# Patient Record
Sex: MALE | Race: BLACK OR AFRICAN AMERICAN | Employment: OTHER | URBAN - METROPOLITAN AREA
[De-identification: names, ages, dates, MRNs, and addresses within clinical notes are randomized per-mention and may not be internally consistent; named-entity substitution may affect disease eponyms.]

---

## 2022-01-12 ENCOUNTER — HOSPITAL ENCOUNTER (OUTPATIENT)
Dept: WOUND CARE | Age: 72
Discharge: HOME | End: 2022-01-12
Attending: PODIATRIST
Payer: MEDICARE

## 2022-01-12 ENCOUNTER — HOSPITAL ENCOUNTER (OUTPATIENT)
Dept: GENERAL RADIOLOGY | Age: 72
Discharge: HOME | End: 2022-01-12
Payer: MEDICARE

## 2022-01-12 VITALS
HEART RATE: 86 BPM | TEMPERATURE: 97 F | SYSTOLIC BLOOD PRESSURE: 154 MMHG | HEIGHT: 74 IN | WEIGHT: 171 LBS | RESPIRATION RATE: 18 BRPM | BODY MASS INDEX: 21.94 KG/M2 | DIASTOLIC BLOOD PRESSURE: 87 MMHG

## 2022-01-12 DIAGNOSIS — I87.332 IDIOPATHIC CHRONIC VENOUS HYPERTENSION OF LEFT LOWER EXTREMITY WITH ULCER AND INFLAMMATION (HCC): ICD-10-CM

## 2022-01-12 DIAGNOSIS — L89.893 PRESSURE INJURY OF LEFT FOOT, STAGE 3 (HCC): Primary | ICD-10-CM

## 2022-01-12 DIAGNOSIS — L97.929 IDIOPATHIC CHRONIC VENOUS HYPERTENSION OF LEFT LOWER EXTREMITY WITH ULCER AND INFLAMMATION (HCC): ICD-10-CM

## 2022-01-12 DIAGNOSIS — L97.422 ULCER OF LEFT HEEL, WITH FAT LAYER EXPOSED (HCC): ICD-10-CM

## 2022-01-12 DIAGNOSIS — L89.893 PRESSURE INJURY OF LEFT FOOT, STAGE 3 (HCC): ICD-10-CM

## 2022-01-12 PROCEDURE — 73630 X-RAY EXAM OF FOOT: CPT

## 2022-01-12 PROCEDURE — 99213 OFFICE O/P EST LOW 20 MIN: CPT

## 2022-01-12 RX ORDER — LIDOCAINE HYDROCHLORIDE 40 MG/ML
SOLUTION TOPICAL ONCE
Status: CANCELLED | OUTPATIENT
Start: 2022-01-12 | End: 2022-01-12

## 2022-01-12 RX ORDER — BACITRACIN ZINC AND POLYMYXIN B SULFATE 500; 1000 [USP'U]/G; [USP'U]/G
OINTMENT TOPICAL ONCE
Status: CANCELLED | OUTPATIENT
Start: 2022-01-12 | End: 2022-01-12

## 2022-01-12 RX ORDER — SILVER SULFADIAZINE 10 G/1000G
CREAM TOPICAL ONCE
Status: CANCELLED | OUTPATIENT
Start: 2022-01-12 | End: 2022-01-12

## 2022-01-12 RX ORDER — LIDOCAINE 50 MG/G
OINTMENT TOPICAL ONCE
Status: CANCELLED | OUTPATIENT
Start: 2022-01-12 | End: 2022-01-12

## 2022-01-12 RX ORDER — LIDOCAINE HYDROCHLORIDE 20 MG/ML
JELLY TOPICAL ONCE
Status: CANCELLED | OUTPATIENT
Start: 2022-01-12 | End: 2022-01-12

## 2022-01-12 RX ORDER — PANTOPRAZOLE SODIUM 40 MG/1
40 TABLET, DELAYED RELEASE ORAL 2 TIMES DAILY
COMMUNITY
End: 2022-03-23

## 2022-01-12 RX ORDER — CHLORPROMAZINE HYDROCHLORIDE 10 MG/1
10 TABLET, FILM COATED ORAL 3 TIMES DAILY
COMMUNITY
End: 2022-03-23

## 2022-01-12 RX ORDER — CLOBETASOL PROPIONATE 0.5 MG/G
OINTMENT TOPICAL ONCE
Status: CANCELLED | OUTPATIENT
Start: 2022-01-12 | End: 2022-01-12

## 2022-01-12 RX ORDER — TRIAMCINOLONE ACETONIDE 1 MG/G
OINTMENT TOPICAL ONCE
Status: CANCELLED | OUTPATIENT
Start: 2022-01-12 | End: 2022-01-12

## 2022-01-12 RX ORDER — BETAMETHASONE DIPROPIONATE 0.5 MG/G
OINTMENT TOPICAL ONCE
Status: CANCELLED | OUTPATIENT
Start: 2022-01-12 | End: 2022-01-12

## 2022-01-12 RX ORDER — GENTAMICIN SULFATE 1 MG/G
OINTMENT TOPICAL ONCE
Status: CANCELLED | OUTPATIENT
Start: 2022-01-12 | End: 2022-01-12

## 2022-01-12 RX ORDER — MUPIROCIN 20 MG/G
OINTMENT TOPICAL ONCE
Status: CANCELLED | OUTPATIENT
Start: 2022-01-12 | End: 2022-01-12

## 2022-01-12 RX ORDER — BACITRACIN 500 [USP'U]/G
OINTMENT TOPICAL ONCE
Status: CANCELLED | OUTPATIENT
Start: 2022-01-12 | End: 2022-01-12

## 2022-01-12 RX ORDER — LIDOCAINE 40 MG/G
CREAM TOPICAL ONCE
Status: CANCELLED | OUTPATIENT
Start: 2022-01-12 | End: 2022-01-12

## 2022-01-12 RX ORDER — FAMOTIDINE 20 MG/1
20 TABLET, FILM COATED ORAL 2 TIMES DAILY
COMMUNITY
End: 2022-03-23

## 2022-01-12 NOTE — DISCHARGE INSTRUCTIONS
[address]  [address], [address]  Phone: [phone]  Fax: [phone]     Patient: Jaylen Hayden MRN: [id number]  SSN: [id number]    YOB: 1950  Age: 70 y.o. Sex: male       Return Appointment: 1 week with Ernie Gary DPM     Instructions: Left plantar 1st met head:  Clean wound with saline. Acticoat Flex 3: cut top approximate size of wound, apply to wound bed. Cover with foam bandage. Change dressing 3 times weekly.     Do not get wound wet in shower. Purchase darco wedge shoe and wear at all times.     ABIs ordered.   Left foot XR ordered.        Should you experience increased redness, swelling, pain, foul odor, size of wound(s), or have a temperature over 101 degrees please contact the [address] [place] [address] Road at [phone] or if after hours contact your primary care physician or go to the hospital emergency department.     Signed By: Pavan Olivre RN      January 12, 2022

## 2022-01-12 NOTE — WOUND CARE
[address]  [address], [address]  Phone: [phone]  Fax: [phone]    Patient: Jaylen Hayden MRN: [id number]  SSN: [id number]    YOB: 1950  Age: 70 y.o. Sex: male       Return Appointment: 1 week with Ernie Gary DPM    Instructions: Left plantar 1st met head:  Clean wound with saline. Acticoat Flex 3: cut top approximate size of wound, apply to wound bed. Cover with foam bandage. Change dressing 3 times weekly. Do not get wound wet in shower. Purchase darco wedge shoe and wear at all times. ABIs ordered. Left foot XR ordered. Should you experience increased redness, swelling, pain, foul odor, size of wound(s), or have a temperature over 101 degrees please contact the [address] [place] [address] Road at [phone] or if after hours contact your primary care physician or go to the hospital emergency department.     Signed By: Pavan Oliver RN     January 12, 2022

## 2022-01-12 NOTE — WOUND CARE
01/12/22 1054   Wound Foot Left;Plantar #1   Date First Assessed/Time First Assessed: 01/12/22 1053   Present on Hospital Admission: Yes  Primary Wound Type: Neuropathic  Location: (c) Foot  Wound Location Orientation: Left;Plantar  Wound Description: #1   Wound Image    Wound Etiology Other (Comment)  (neuropathic)   Dressing Status Old drainage noted   Cleansed Cleansed with saline   Dressing/Treatment Band-Aid/Adhesive bandage   Offloading for Diabetic Foot Ulcers   (not offloading at this time)   Wound Length (cm) 0.5 cm   Wound Width (cm) 0.6 cm   Wound Depth (cm) 0.1 cm   Wound Surface Area (cm^2) 0.3 cm^2   Wound Volume (cm^3) 0.03 cm^3   Wound Assessment Pink/red   Drainage Amount Small   Drainage Description Serosanguinous   Wound Odor None   Ora-Wound/Incision Assessment Hyperkeratosis (Callous)   Wound Thickness Description Full thickness

## 2022-01-12 NOTE — PROGRESS NOTES
[address], [address] RECORD NUMBER:  [id number]  AGE: 70 y.o. RACE BLACK/  GENDER: male  : 1950  EPISODE DATE:  2022    Subjective:     Chief Complaint   Patient presents with    Follow-up     left plantar foot wound onset 1 year        Ben Fisher is a 70 y.o. BLACK/ male who presents with a wound to the left lower extremity at the sub 1st metatarsal head. He notes this has been present for over 1 year. He has been under the care of a local podiatrist since onset with various OTC ointments, prescription antibiotic ointment and insoles being tried. He reports neuropathy with sensory loss but is unknown for the etiology. He denies diabetes, back conditions and chemotherapy. Currently he is wearing insoles in his shoes and using antibiotic ointment to the wound. He notes no radiographs being taken for the last 3 years. He is concerned over edema to the LLE that is periodic but always worse than the right. He notes minimal drainage to the wound. He denies odor.       HISTORY of PRESENT ILLNESS HPI    Nature: Painless  Location: left sub 1st metatarsal head  Duration:   Onset: Patient states it started as unknown  Course: stalled  Aggravating/Alleviating: None reported  Treatment: antibiotic ointment    Ulcer Identification:  Ulcer Type: venous, pressure and neuropathic    Contributing Factors: edema, chronic pressure and shear force    Wound: venous          PAST MEDICAL HISTORY    Past Medical History:   Diagnosis Date    Idiopathic chronic venous hypertension of left lower extremity with ulcer and inflammation ([address] 2022    Peptic ulcer disease     Ulcer of left heel, with fat layer exposed ([address] 2022        PAST SURGICAL HISTORY    Past Surgical History:   Procedure Laterality Date    HX HERNIA REPAIR      hiatal hernia repair FAMILY HISTORY    History reviewed. No pertinent family history. SOCIAL HISTORY    Social History     Tobacco Use    Smoking status: Never Smoker    Smokeless tobacco: Former User   Substance Use Topics    Alcohol use: Yes    Drug use: Not on file       ALLERGIES    No Known Allergies    MEDICATIONS    Current Outpatient Medications on File Prior to Encounter   Medication Sig Dispense Refill    pantoprazole (Protonix) 40 mg tablet Take 40 mg by mouth two (2) times a day.  famotidine (Pepcid) 20 mg tablet Take 20 mg by mouth two (2) times a day.  chlorproMAZINE (THORAZINE) 10 mg tablet Take 10 mg by mouth three (3) times daily. No current facility-administered medications on file prior to encounter. REVIEW OF SYSTEMS    Pertinent items are noted in HPI. Objective:     Visit Vitals  BP (!) 154/87 (BP 1 Location: Left upper arm, BP Patient Position: At rest)   Pulse 86   Temp 97 °F (36.1 °C)   Resp 18   Ht 6' 2\" (1.88 m)   Wt 77.6 kg (171 lb)   BMI 21.96 kg/m²       Wt Readings from Last 3 Encounters:   01/12/22 77.6 kg (171 lb)       PHYSICAL EXAM    General: well developed, well nourished, pleasant , NAD. Hygiene good  Psych: cooperative. Pleasant. No anxiety or depression. Normal mood and affect. HEENT: Normocephalic, atraumatic. EOMI. Conjunctiva clear. No scleral icterus. Neck: Normal range of motion. No masses. Chest: Good air entry bilaterally. Respirations nonlabored  Cardio: Normal heart sounds,no rubs, murmurs or gallops  Abdomen: Soft, nontender, nondistended, normoactive bowel sounds    Vascular: DP and PT pulses are palpable at 2/4 bilateral. Skin temperature is uniform from proximal to distal bilateral. Hair growth is absent bilateral. No erythema, focal edema, heat is appreciated bilateral. No evidence of cellulitis. Derm: No paronychial infections are noted. Skin is atrophic and xerotic. No subcutaneous masses or hyperpigmented lesions are present.    Neuro: Epicritic sensation is absent bilateral. Protective sensation is absent with 5.07 SWMF testing to all 10 sites bilateral. Muscle tone and bulk is symmetric bilateral.  Msk: Muscle strength is 5/5 for all prime movers of the foot bilateral. No effusions are palpable. Full thickness ulceration is noted to the left sub 1st metatarsal head. Hyperkeratotic rim with fibrous base. No erythema, focal edema, purulence or odor. Soft end feel with no bone exposed or palpable. No undermining or sinus track is noted. No fluctuance with palpation. +1 pitting edema left lower extremity with hyperpigmentation noted. Clawing of the digits 1-5 bilateral with pes cavus arch/foot structure. Wound Foot Left;Plantar #1 (Active)   Wound Image   01/12/22 1054   Wound Etiology Other (Comment) 01/12/22 1054   Dressing Status Old drainage noted 01/12/22 1054   Cleansed Cleansed with saline 01/12/22 1054   Dressing/Treatment Band-Aid/Adhesive bandage 01/12/22 1054   Wound Length (cm) 0.5 cm 01/12/22 1054   Wound Width (cm) 0.6 cm 01/12/22 1054   Wound Depth (cm) 0.1 cm 01/12/22 1054   Wound Surface Area (cm^2) 0.3 cm^2 01/12/22 1054   Wound Volume (cm^3) 0.03 cm^3 01/12/22 1054   Wound Assessment Pink/red 01/12/22 1054   Drainage Amount Small 01/12/22 1054   Drainage Description Serosanguinous 01/12/22 1054   Wound Odor None 01/12/22 1054   Ora-Wound/Incision Assessment Hyperkeratosis (Callous) 01/12/22 1054   Wound Thickness Description Full thickness 01/12/22 1054   Number of days: 0       DATA REVIEW:  No results found for this or any previous visit (from the past 336 hour(s)).       Assessment/Plan:      Problem List Items Addressed This Visit        Circulatory    Idiopathic chronic venous hypertension of left lower extremity with ulcer and inflammation ([facility]    Relevant Orders    REFERRAL TO VASCULAR CENTER       Other    Ulcer of left heel, with fat layer exposed ([facility]    Relevant Orders    REFERRAL TO [facility]. [facility] [address] Other Visit Diagnoses     Pressure injury of left foot, stage 3 ([facility]    -  Primary    Relevant Orders    XR FOOT LT MIN 3 V    REFERRAL TO VASCULAR CENTER        Patient examined and evaluated. Educated patient and barriers to healing. His foot structure is such that he has clawing of the digits and pes cavus which plantarflexes the metatarsal heads creating high pressure forces. Pressure reduction is paramount due to location of the wound. Will control this through the use of a darco wedge shoe. This must be worn at all times, inside and outside the home. If he has any stability issues with this style device, will change to flat shoe with peg insole. Infection control is required to prevent loss of limb/life - maintain dressing coverage at all times, do not soak or get wet in the shower, wash hands before and after dressing changes. Monitor for erythema, edema, odor, and thick yellow drainage and contact the center immediately if noted. Proper nutrition to support skin growth includes increased protein, vitamins and amino acids - animal based foods and Oliver recommended. Moisture management to prevent maceration and dryness - keep wound covered at all times outside dressing care, do not \"air out\" or soak. Dressing will consist of acticoat changed three times a week. Radiographs ordered to evaluate for OM due to the longevity of the wound. MAGGIE studies ordered to allow for compression therapy. Return in 1 week. Patient instructed on the following: Follow all wound dressing instructions. Do not get dressing or wound wet. May shower if wound can be effectively kept dry. Cast covers may be purchased at [facility] and [facility].     Should you experience increased redness, swelling, pain, foul odor, size of wound(s), or have a temperature over 101 degrees please [facility] at [phone] or if after hours contact your primary care physician or go to the hospital emergency department. Orders Placed This Encounter    XR FOOT LT MIN 3 V     Standing Status:   Future     Standing Expiration Date:   2/11/2023     Order Specific Question:   Reason for Exam     Answer:   evaluate for ostemyelitis 1st metatarsal head    REFERRAL TO VASCULAR CENTER     Referral Priority:   Routine     Referral Type:   Consultation     Referral Reason:   Specialty Services Required     Number of Visits Requested:   1    pantoprazole (Protonix) 40 mg tablet     Sig: Take 40 mg by mouth two (2) times a day.  famotidine (Pepcid) 20 mg tablet     Sig: Take 20 mg by mouth two (2) times a day.  chlorproMAZINE (THORAZINE) 10 mg tablet     Sig: Take 10 mg by mouth three (3) times daily. Follow-up Information     Follow up With Specialties Details Why Contact Info    [address] In 1 week  [place] [address]  [phone]         MDM  Number of Diagnoses or Management Options  Idiopathic chronic venous hypertension of left lower extremity with ulcer and inflammation ([address]): new, needed workup  Pressure injury of left foot, stage 3 ([address]): new, needed workup  Ulcer of left heel, with fat layer exposed ([address]): new, needed workup     Amount and/or Complexity of Data Reviewed  Tests in the radiology section of CPT®: ordered (Radiographs, left foot  MAGGIE studies)    Risk of Complications, Morbidity, and/or Mortality  Presenting problems: moderate  Diagnostic procedures: moderate  Management options: moderate    Patient Progress  Patient progress: other (comment) (new)      Treatment Note please see attached Discharge Instructions    Written patient dismissal instructions given to patient or POA.          Electronically signed by Mayte Hannon DPM on 1/12/2022 at 11:51 AM

## 2022-01-26 ENCOUNTER — HOSPITAL ENCOUNTER (OUTPATIENT)
Dept: WOUND CARE | Age: 72
Discharge: HOME | End: 2022-01-26
Attending: PODIATRIST
Payer: MEDICARE

## 2022-01-26 VITALS — BODY MASS INDEX: 21.94 KG/M2 | HEIGHT: 74 IN | WEIGHT: 171 LBS

## 2022-01-26 DIAGNOSIS — L97.422 ULCER OF LEFT HEEL, WITH FAT LAYER EXPOSED (HCC): ICD-10-CM

## 2022-01-26 DIAGNOSIS — L97.929 IDIOPATHIC CHRONIC VENOUS HYPERTENSION OF LEFT LOWER EXTREMITY WITH ULCER AND INFLAMMATION (HCC): Primary | ICD-10-CM

## 2022-01-26 DIAGNOSIS — I87.332 IDIOPATHIC CHRONIC VENOUS HYPERTENSION OF LEFT LOWER EXTREMITY WITH ULCER AND INFLAMMATION (HCC): Primary | ICD-10-CM

## 2022-01-26 PROCEDURE — 97597 DBRDMT OPN WND 1ST 20 CM/<: CPT

## 2022-01-26 RX ORDER — TRIAMCINOLONE ACETONIDE 1 MG/G
OINTMENT TOPICAL ONCE
Status: CANCELLED | OUTPATIENT
Start: 2022-01-26 | End: 2022-01-26

## 2022-01-26 RX ORDER — BACITRACIN ZINC AND POLYMYXIN B SULFATE 500; 1000 [USP'U]/G; [USP'U]/G
OINTMENT TOPICAL ONCE
Status: CANCELLED | OUTPATIENT
Start: 2022-01-26 | End: 2022-01-26

## 2022-01-26 RX ORDER — BACITRACIN 500 [USP'U]/G
OINTMENT TOPICAL ONCE
Status: CANCELLED | OUTPATIENT
Start: 2022-01-26 | End: 2022-01-26

## 2022-01-26 RX ORDER — MUPIROCIN 20 MG/G
OINTMENT TOPICAL ONCE
Status: CANCELLED | OUTPATIENT
Start: 2022-01-26 | End: 2022-01-26

## 2022-01-26 RX ORDER — LIDOCAINE 50 MG/G
OINTMENT TOPICAL ONCE
Status: CANCELLED | OUTPATIENT
Start: 2022-01-26 | End: 2022-01-26

## 2022-01-26 RX ORDER — LIDOCAINE 40 MG/G
CREAM TOPICAL ONCE
Status: CANCELLED | OUTPATIENT
Start: 2022-01-26 | End: 2022-01-26

## 2022-01-26 RX ORDER — LIDOCAINE HYDROCHLORIDE 40 MG/ML
SOLUTION TOPICAL ONCE
Status: CANCELLED | OUTPATIENT
Start: 2022-01-26 | End: 2022-01-26

## 2022-01-26 RX ORDER — LIDOCAINE HYDROCHLORIDE 20 MG/ML
JELLY TOPICAL ONCE
Status: CANCELLED | OUTPATIENT
Start: 2022-01-26 | End: 2022-01-26

## 2022-01-26 RX ORDER — GENTAMICIN SULFATE 1 MG/G
OINTMENT TOPICAL ONCE
Status: CANCELLED | OUTPATIENT
Start: 2022-01-26 | End: 2022-01-26

## 2022-01-26 RX ORDER — BETAMETHASONE DIPROPIONATE 0.5 MG/G
OINTMENT TOPICAL ONCE
Status: CANCELLED | OUTPATIENT
Start: 2022-01-26 | End: 2022-01-26

## 2022-01-26 RX ORDER — CLOBETASOL PROPIONATE 0.5 MG/G
OINTMENT TOPICAL ONCE
Status: CANCELLED | OUTPATIENT
Start: 2022-01-26 | End: 2022-01-26

## 2022-01-26 RX ORDER — SILVER SULFADIAZINE 10 G/1000G
CREAM TOPICAL ONCE
Status: CANCELLED | OUTPATIENT
Start: 2022-01-26 | End: 2022-01-26

## 2022-01-26 NOTE — PROGRESS NOTES
[address], [address] RECORD NUMBER:  [id number]  AGE: 70 y.o. RACE BLACK/  GENDER: male  : 1950  EPISODE DATE:  2022    Subjective:     Chief Complaint   Patient presents with    Follow-up     left foot        Sigrid Mahoney is a 70 y.o. BLACK/ male who presents with a wound to the left lower extremity at the sub 1st metatarsal head. He had his MAGGIE and radiographs studies performed. He did get the darco shoe but had difficulty wearing it due to instability. He is currently wearing new custom insoles. HISTORY of PRESENT ILLNESS HPI    Nature: Painless  Location: left sub 1st metatarsal head  Duration:   Onset: Patient states it started as unknown  Course: improving  Aggravating/Alleviating: None reported  Treatment: acticoat    Ulcer Identification:  Ulcer Type: venous, pressure and neuropathic    Contributing Factors: edema, chronic pressure and shear force    Wound: venous          PAST MEDICAL HISTORY    Past Medical History:   Diagnosis Date    Idiopathic chronic venous hypertension of left lower extremity with ulcer and inflammation ([facility] 2022    Peptic ulcer disease     Ulcer of left heel, with fat layer exposed ([facility] [address] 2022        PAST SURGICAL HISTORY    Past Surgical History:   Procedure Laterality Date    HX HERNIA REPAIR      hiatal hernia repair       FAMILY HISTORY    History reviewed. No pertinent family history. SOCIAL HISTORY    Social History     Tobacco Use    Smoking status: Never Smoker    Smokeless tobacco: Former User   Substance Use Topics    Alcohol use:  Yes    Drug use: Not on file       ALLERGIES    No Known Allergies    MEDICATIONS    Current Outpatient Medications on File Prior to Encounter   Medication Sig Dispense Refill    pantoprazole (Protonix) 40 mg tablet Take 40 mg by mouth two (2) times a day.      famotidine (Pepcid) 20 mg tablet Take 20 mg by mouth two (2) times a day.  chlorproMAZINE (THORAZINE) 10 mg tablet Take 10 mg by mouth three (3) times daily. No current facility-administered medications on file prior to encounter. REVIEW OF SYSTEMS    Pertinent items are noted in HPI. Objective:     Visit Vitals  Ht 6' 2\" (1.88 m)   Wt 77.6 kg (171 lb)   BMI 21.96 kg/m²       Wt Readings from Last 3 Encounters:   01/26/22 77.6 kg (171 lb)   01/12/22 77.6 kg (171 lb)       PHYSICAL EXAM    General: well developed, well nourished, pleasant , NAD. Hygiene good  Psych: cooperative. Pleasant. No anxiety or depression. Normal mood and affect. HEENT: Normocephalic, atraumatic. EOMI. Conjunctiva clear. No scleral icterus. Neck: Normal range of motion. No masses. Chest: Good air entry bilaterally. Respirations nonlabored  Cardio: Normal heart sounds,no rubs, murmurs or gallops  Abdomen: Soft, nontender, nondistended, normoactive bowel sounds    Vascular: DP and PT pulses are palpable at 2/4 bilateral. Skin temperature is uniform from proximal to distal bilateral. Hair growth is absent bilateral. No erythema, focal edema, heat is appreciated bilateral. No evidence of cellulitis. Derm: No paronychial infections are noted. Skin is atrophic and xerotic. No subcutaneous masses or hyperpigmented lesions are present. Neuro: Epicritic sensation is absent bilateral. Protective sensation is absent with 5.07 SWMF testing to all 10 sites bilateral. Muscle tone and bulk is symmetric bilateral.  Msk: Muscle strength is 5/5 for all prime movers of the foot bilateral. No effusions are palpable. Full thickness ulceration is noted to the left sub 1st metatarsal head. Hyperkeratotic rim with dry granular base. No erythema, focal edema, purulence or odor. Soft end feel with no bone exposed or palpable. No undermining or sinus track is noted. No fluctuance with palpation.  +1 pitting edema left lower extremity with hyperpigmentation noted. Clawing of the digits 1-5 bilateral with pes cavus arch/foot structure. Wound Foot Left;Plantar #1 (Active)   Wound Image   01/26/22 1031   Wound Etiology Other (Comment) 01/26/22 1031   Dressing Status Clean;Dry; Intact 01/26/22 1031   Cleansed Cleansed with saline 01/26/22 1031   Dressing/Treatment Band-Aid/Adhesive bandage 01/12/22 1054   Offloading for Diabetic Foot Ulcers Diabetic shoes/inserts 01/26/22 1031   Wound Length (cm) 0.2 cm 01/26/22 1031   Wound Width (cm) 0.2 cm 01/26/22 1031   Wound Depth (cm) 0.3 cm 01/26/22 1031   Wound Surface Area (cm^2) 0.04 cm^2 01/26/22 1031   Change in Wound Size % 86.67 01/26/22 1031   Wound Volume (cm^3) 0.012 cm^3 01/26/22 1031   Wound Healing % 60 01/26/22 1031   Wound Assessment Pink/red 01/26/22 1031   Drainage Amount Small 01/26/22 1031   Drainage Description Serosanguinous 01/26/22 1031   Wound Odor None 01/26/22 1031   Ora-Wound/Incision Assessment Hyperkeratosis (Callous) 01/26/22 1031   Wound Thickness Description Full thickness 01/26/22 1031   Number of days: 14        DATA REVIEW:  Recent Results (from the past 336 hour(s))   DUPLEX LOW EXT ARTERIES WITH MAGGIE    Collection Time: 01/20/22  3:22 PM   Result Value Ref Range    Left CFA Velocity 93.2 cm/s    Right CFA .0 cm/s    Left CFA prox sys PSV 93.2 cm/s    Left Dist LORI Velocity 102.0 cm/s    Left popliteal dist sys .0 cm/s    Left popliteal prox sys PSV 87.9 cm/s    Left Dist PTA PSV 65.1 cm/s    Left Dist Peroneal Velocity 48.3 cm/s    Left super femoral dist sys .0 cm/s    Left super femoral mid sys .0 cm/s    Left super femoral prox sys .0 cm/s    Left Prox PFA A PSV 58.2 cm/s    Right CFA prox sys .0 cm/s    Right Dist LORI Velocity 86.2 cm/s    Right popliteal dist sys .0 cm/s    Right popliteal prox sys PSV 86.2 cm/s    Right Dist PTA PSV 85.3 cm/s    Right Dist Peroneal Velocity 55.0 cm/s    Right super femoral dist sys PSV 93.8 cm/s    Right super femoral mid sys PSV 97.6 cm/s    Right super femoral prox sys .0 cm/s    Right Prox PFA A PSV 77.7 cm/s    Dist aortic AP 2.11 cm    Dist aortic trans 1.93 cm    Left MAGGIE 1.13     Left dorsalis pedis  mmHg    Left posterior tibial 0 mmHg    Left toe pressure 129 mmHg    Left TBI 0.82     Right MAGGIE 1.06     Right dorsalis pedis  mmHg    Right posterior tibial 159 mmHg    Right toe pressure 157 mmHg    Right TBI 0.99     Left arm  mmHg    Right arm  mmHg    Right SFA Prox Kyle Ratio 0.9     Right SFA Mid Kyle Ratio 0.9     Right SFA Dist Kyle Ratio 0.96     Right Pop A Prox Kyle Ratio 0.92     Left SFA Prox Kyle Ratio 1.34     Left SFA Mid Kyle Ratio 0.91     Left SFA Dist Kyle Ratio 1.08     Left Pop A Prox Kyle Ratio 0.71        LEFT FOOT 3 view(s).     INDICATION: Wound to bottom of left foot and ankle swelling, possible  osteomyelitis first metatarsal head     TECHNIQUE: AP and lateral and oblique views.     COMPARISON: None.     FINDINGS: No periostitis of the first metatarsal. No cortical erosions. There  are small subchondral cysts. Degenerative changes osteophytes. Osteopenia. Hammertoe deformities. Arthritis of the tibiotalar talonavicular joints.     IMPRESSION  Negative for acute osteomyelitis. Degenerative changes. .    Assessment/Plan:      Problem List Items Addressed This Visit        Circulatory    Idiopathic chronic venous hypertension of left lower extremity with ulcer and inflammation (HCC) - Primary    Relevant Orders    INITIATE OUTPATIENT WOUND CARE PROTOCOL       Other    Ulcer of left heel, with fat layer exposed ([facility] [address]    Relevant Orders    INITIATE OUTPATIENT WOUND CARE PROTOCOL          Debridement Wound Care      Procedure Note  Indications:  Based on my examination of this patient's wound(s)/ulcer(s) today, debridement is required to promote healing and evaluate the wound base.     Performed by: CAMACHO Johnson obtained: Yes    Time out taken: Yes    Debridement: Other selective    Using # 15 blade scalpel the wound(s)/ulcer(s) was/were sharply debrided down through and including the removal of    subcutaneous tissue    Devitalized Tissue Debrided: fibrin, biofilm, slough and callus    Pre Debridement Measurements:  Are located in the Wound/Ulcer Documentation Flow Sheet: see above flowsheet    Other: venous, pressure    Wound/Ulcer #: left foot    Post Debridement Measurements:  Wound/Ulcer Descriptions are Pre Debridement except measurements: Total Surface Area Debrided:  0.04 sq cm     Estimated Blood Loss:  None    Hemostasis Achieved: Pressure    Procedural Pain: 0 / 10     Post Procedural Pain: 0 / 10     Response to treatment: Well tolerated by patient       Patient examined and evaluated. Educated patient and barriers to healing. His foot structure is such that he has clawing of the digits and pes cavus which plantarflexes the metatarsal heads creating high pressure forces. Selective debridement performed as above. Pressure reduction is paramount due to location of the wound. Will control this through the use of a darco wedge shoe. This must be worn at all times, inside and outside the home. If he has any stability issues with this style device, will change to flat shoe with peg insole. Infection control is required to prevent loss of limb/life - maintain dressing coverage at all times, do not soak or get wet in the shower, wash hands before and after dressing changes. Monitor for erythema, edema, odor, and thick yellow drainage and contact the center immediately if noted. Proper nutrition to support skin growth includes increased protein, vitamins and amino acids - animal based foods and Oliver recommended. Moisture management to prevent maceration and dryness - keep wound covered at all times outside dressing care, do not \"air out\" or soak.      Dressing will consist of acticoat changed three times a week. Radiographs reviewed- shows degenerative changes without osseous erosions. MAGGIE studies reviewed - WNL for compression therapy. Begin compression and return in 2 days for first change. Return in 1 week. Patient instructed on the following: Follow all wound dressing instructions. Do not get dressing or wound wet. May shower if wound can be effectively kept dry. Cast covers may be purchased at [facility] [place]. Should you experience increased redness, swelling, pain, foul odor, size of wound(s), or have a temperature over 101 degrees please contact the [address] [place] [address] Road at [phone] or if after hours contact your primary care physician or go to the hospital emergency department. Orders Placed This Encounter    INITIATE OUTPATIENT WOUND CARE PROTOCOL     Cleanse wound with saline. If wound contains bioburden or contamination, cleanse with wound cleanser or antimicrobial solution. For normal periwound tissue without irritation nor maceration, apply topical skin protectant. For periwound tissue with irritation and/or maceration, apply zinc based product, topical steroid cream/ointment, or equivalent. For wounds with dry firm black eschar and/or without exudate, apply betadine and leave open to air.     For wounds with scant/small to no exudate or drainage, apply wound gel, hydrocolloid, polymer, or equivalent and cover with secondary dressing/foam.    For wounds with moderate/large exudate or drainage, apply alginate, hydrofiber, polymer, or equivalent and cover with secondary dressing/foam.    For wounds with nonviable tissue requiring removal, apply chemical or mechanical debrider and cover with secondary dressing/foam.    For wounds with tunneling, dead space, or cavity, fill or pack with strip/guaze/kerlix to fit and cover with secondary dressing/foam.    For wounds with adequate granulation or epithelization, apply wound gel, hydrocolloid, polymer, collagen, or transparent film, and cover secondary dry dressing/foam.    For wounds that need additional secondary dressing to help pad or control additional drainage/exudates, add foam, absorbent pad or hydrocolloid. For wounds with suspected or know infection, apply antimicrobial mesh and/or antimicrobial alginate/hydrofiber, or antimicrobial solution moistened gauze/kerlix, or equivalent and cover with secondary dressing foam.    Compression management needed for edema control, apply multilayer compression or tubular garment or equivalent. Offloading Management needed for pressure relief, apply offloading shoe/boot or equivalent. Standing Status:   Standing     Number of Occurrences:   1       Follow-up Information    None        MDM  Number of Diagnoses or Management Options  Idiopathic chronic venous hypertension of left lower extremity with ulcer and inflammation ([address]): established, improving  Ulcer of left heel, with fat layer exposed ([address]): established, improving     Amount and/or Complexity of Data Reviewed  Tests in the radiology section of CPT®: reviewed (Radiograph reports  MAGGIE report)  Independent visualization of images, tracings, or specimens: yes (Radiograph images)    Risk of Complications, Morbidity, and/or Mortality  Presenting problems: low  Management options: low    Patient Progress  Patient progress: improved      Treatment Note please see attached Discharge Instructions    Written patient dismissal instructions given to patient or POA.          Electronically signed by Lili Cadena DPM on 1/26/2022 at 11:51 AM

## 2022-01-26 NOTE — WOUND CARE
[address]  [address], [address]  Phone: [phone]  Fax: [phone]    Patient: Sera Muse MRN: [id number]  SSN: [id number]    YOB: 1950  Age: 70 y.o. Sex: male       Return Appointment: 1 week with Peg Auguste DPM  Change in wound center on Friday with clinician    Instructions: Left plantar 1st met head:  Clean wound with saline. Acticoat Flex 3: cut top approximate size of wound, apply to wound bed. Secure with abd or gauze. 2 layer compression with wound and lower extremity assessment. If there is any problem with the dressing (too tight, slides down, etc.) Patient to return to wound clinic to have re-wrapped by clinician.     Do not get wound wet in shower. Continue to wear your custom inserts. Should you experience increased redness, swelling, pain, foul odor, size of wound(s), or have a temperature over 101 degrees please contact the [address] [place] [address] Road at [phone] or if after hours contact your primary care physician or go to the hospital emergency department.     Signed By: Michelle Mathur     January 26, 2022

## 2022-01-26 NOTE — WOUND CARE
01/26/22 1031   Wound Foot Left;Plantar #1   Date First Assessed/Time First Assessed: 01/12/22 1053   Present on Hospital Admission: Yes  Primary Wound Type: Neuropathic  Location: (c) Foot  Wound Location Orientation: Left;Plantar  Wound Description: #1   Wound Image    Wound Etiology Other (Comment)  (neuropathic)   Dressing Status Clean;Dry; Intact   Cleansed Cleansed with saline   Dressing/Treatment   (acticoat)   Offloading for Diabetic Foot Ulcers Diabetic shoes/inserts   Wound Length (cm) 0.2 cm   Wound Width (cm) 0.2 cm   Wound Depth (cm) 0.3 cm   Wound Surface Area (cm^2) 0.04 cm^2   Change in Wound Size % 86.67   Wound Volume (cm^3) 0.012 cm^3   Wound Healing % 60   Wound Assessment Pink/red   Drainage Amount Small   Drainage Description Serosanguinous   Wound Odor None   Ora-Wound/Incision Assessment Hyperkeratosis (Callous)   Wound Thickness Description Full thickness

## 2022-01-26 NOTE — DISCHARGE INSTRUCTIONS
[address]  [address], [address]   Phone: [phone]  Fax: [phone]    Patient: Kateryna Magallanes MRN: [id number]  SSN: [id number]    YOB: 1950  Age: 70 y.o. Sex: male       Return Appointment: 1 week with Mikel Bhakta DPM    Instructions:  Left plantar 1st met head:  Clean wound with saline. Acticoat Flex 3: cut top approximate size of wound, apply to wound bed.   Secure with abd or gauze. 2 layer compression with wound and lower extremity assessment. If there is any problem with the dressing (too tight, slides down, etc.) Patient to return to wound clinic to have re-wrapped by clinician.     Do not get wound wet in shower. Continue to wear your custom inserts. Should you experience increased redness, swelling, pain, foul odor, size of wound(s), or have a temperature over 101 degrees please contact the [address] [place] [address] Road at [phone] or if after hours contact your primary care physician or go to the hospital emergency department.     Signed By: Gino Wagner     January 26, 2022

## 2022-01-26 NOTE — WOUND CARE
Multilayer Compression Wrap   (Not Unna) Below the Knee    NAME:  Zuleyka Greer OF BIRTH:  1950  MEDICAL RECORD NUMBER:  [id number]  DATE:  1/26/2022    Removed old Multilayer wrap if indicated and wash leg with mild soap/water. Applied primary and secondary dressing as ordered. Applied multilayered dressing below the knee to left lower leg. Instructed patient/caregiver not to remove dressing and to keep it clean and dry. Instructed patient/caregiver on complications to report to provider, such as pain, numbness in toes, heavy drainage, and slippage of dressing. Instructed patient on purpose of compression dressing and on activity and exercise recommendations.     Response to treatment: Well tolerated by patient       Electronically signed by Rita Kirby on 1/26/2022 at 11:05 AM

## 2022-01-28 ENCOUNTER — HOSPITAL ENCOUNTER (OUTPATIENT)
Dept: WOUND CARE | Age: 72
Discharge: HOME | End: 2022-01-28
Attending: PODIATRIST
Payer: MEDICARE

## 2022-01-28 VITALS
BODY MASS INDEX: 22.07 KG/M2 | DIASTOLIC BLOOD PRESSURE: 88 MMHG | SYSTOLIC BLOOD PRESSURE: 143 MMHG | WEIGHT: 172 LBS | TEMPERATURE: 98.4 F | HEART RATE: 84 BPM | RESPIRATION RATE: 18 BRPM | HEIGHT: 74 IN

## 2022-01-28 DIAGNOSIS — L97.929 IDIOPATHIC CHRONIC VENOUS HYPERTENSION OF LEFT LOWER EXTREMITY WITH ULCER AND INFLAMMATION (HCC): Primary | ICD-10-CM

## 2022-01-28 DIAGNOSIS — L97.422 ULCER OF LEFT HEEL, WITH FAT LAYER EXPOSED (HCC): ICD-10-CM

## 2022-01-28 DIAGNOSIS — I87.332 IDIOPATHIC CHRONIC VENOUS HYPERTENSION OF LEFT LOWER EXTREMITY WITH ULCER AND INFLAMMATION (HCC): Primary | ICD-10-CM

## 2022-01-28 PROCEDURE — 29581 APPL MULTLAYER CMPRN SYS LEG: CPT

## 2022-01-28 RX ORDER — LIDOCAINE 50 MG/G
OINTMENT TOPICAL ONCE
Status: CANCELLED | OUTPATIENT
Start: 2022-01-28 | End: 2022-01-28

## 2022-01-28 RX ORDER — LIDOCAINE HYDROCHLORIDE 20 MG/ML
JELLY TOPICAL ONCE
Status: CANCELLED | OUTPATIENT
Start: 2022-01-28 | End: 2022-01-28

## 2022-01-28 RX ORDER — MUPIROCIN 20 MG/G
OINTMENT TOPICAL ONCE
Status: CANCELLED | OUTPATIENT
Start: 2022-01-28 | End: 2022-01-28

## 2022-01-28 RX ORDER — GENTAMICIN SULFATE 1 MG/G
OINTMENT TOPICAL ONCE
Status: CANCELLED | OUTPATIENT
Start: 2022-01-28 | End: 2022-01-28

## 2022-01-28 RX ORDER — BACITRACIN ZINC AND POLYMYXIN B SULFATE 500; 1000 [USP'U]/G; [USP'U]/G
OINTMENT TOPICAL ONCE
Status: CANCELLED | OUTPATIENT
Start: 2022-01-28 | End: 2022-01-28

## 2022-01-28 RX ORDER — CLOBETASOL PROPIONATE 0.5 MG/G
OINTMENT TOPICAL ONCE
Status: CANCELLED | OUTPATIENT
Start: 2022-01-28 | End: 2022-01-28

## 2022-01-28 RX ORDER — TRIAMCINOLONE ACETONIDE 1 MG/G
OINTMENT TOPICAL ONCE
Status: CANCELLED | OUTPATIENT
Start: 2022-01-28 | End: 2022-01-28

## 2022-01-28 RX ORDER — BACITRACIN 500 [USP'U]/G
OINTMENT TOPICAL ONCE
Status: CANCELLED | OUTPATIENT
Start: 2022-01-28 | End: 2022-01-28

## 2022-01-28 RX ORDER — BETAMETHASONE DIPROPIONATE 0.5 MG/G
OINTMENT TOPICAL ONCE
Status: CANCELLED | OUTPATIENT
Start: 2022-01-28 | End: 2022-01-28

## 2022-01-28 RX ORDER — LIDOCAINE HYDROCHLORIDE 40 MG/ML
SOLUTION TOPICAL ONCE
Status: CANCELLED | OUTPATIENT
Start: 2022-01-28 | End: 2022-01-28

## 2022-01-28 RX ORDER — LIDOCAINE 40 MG/G
CREAM TOPICAL ONCE
Status: CANCELLED | OUTPATIENT
Start: 2022-01-28 | End: 2022-01-28

## 2022-01-28 RX ORDER — SILVER SULFADIAZINE 10 G/1000G
CREAM TOPICAL ONCE
Status: CANCELLED | OUTPATIENT
Start: 2022-01-28 | End: 2022-01-28

## 2022-01-28 NOTE — WOUND CARE
01/28/22 1115   Wound Foot Left;Plantar #1   Date First Assessed/Time First Assessed: 01/12/22 1053   Present on Hospital Admission: Yes  Primary Wound Type: Neuropathic  Location: (c) Foot  Wound Location Orientation: Left;Plantar  Wound Description: #1   Wound Image    Wound Etiology Other (Comment)  (neuropathic)   Dressing Status Clean;Dry; Intact   Cleansed Soap and water   Dressing/Treatment   (acticoat, coflex)   Offloading for Diabetic Foot Ulcers Diabetic shoes/inserts   Wound Length (cm) 0.2 cm   Wound Width (cm) 0.2 cm   Wound Depth (cm) 0.1 cm   Wound Surface Area (cm^2) 0.04 cm^2   Change in Wound Size % 86.67   Wound Volume (cm^3) 0.004 cm^3   Wound Healing % 87   Wound Assessment Pink/red   Drainage Amount Scant   Drainage Description Serosanguinous   Wound Odor None   Ora-Wound/Incision Assessment Hyperkeratosis (Callous)   Wound Thickness Description Full thickness

## 2022-01-28 NOTE — WOUND CARE
Multilayer Compression Wrap   (Not Unna) Below the Knee    NAME:  Alfredo Mccollum OF BIRTH:  1950  MEDICAL RECORD NUMBER:  [id number]  DATE:  1/28/2022    Removed old Multilayer wrap if indicated and wash leg with mild soap/water. Applied moisturizing agent to dry skin as needed. Applied primary and secondary dressing as ordered. Applied multilayered dressing below the knee to left lower leg. Instructed patient/caregiver not to remove dressing and to keep it clean and dry. Instructed patient/caregiver on complications to report to provider, such as pain, numbness in toes, heavy drainage, and slippage of dressing. Instructed patient on purpose of compression dressing and on activity and exercise recommendations.     Response to treatment: Well tolerated by patient       Electronically signed by Landon Nesbitt on 1/28/2022 at 11:16 AM

## 2022-02-02 ENCOUNTER — HOSPITAL ENCOUNTER (OUTPATIENT)
Dept: WOUND CARE | Age: 72
Discharge: HOME | End: 2022-02-02
Attending: PODIATRIST
Payer: MEDICARE

## 2022-02-02 VITALS
OXYGEN SATURATION: 99 % | RESPIRATION RATE: 16 BRPM | DIASTOLIC BLOOD PRESSURE: 79 MMHG | WEIGHT: 168 LBS | BODY MASS INDEX: 21.56 KG/M2 | HEART RATE: 74 BPM | TEMPERATURE: 97.2 F | SYSTOLIC BLOOD PRESSURE: 130 MMHG | HEIGHT: 74 IN

## 2022-02-02 DIAGNOSIS — I87.332 IDIOPATHIC CHRONIC VENOUS HYPERTENSION OF LEFT LOWER EXTREMITY WITH ULCER AND INFLAMMATION (HCC): Primary | ICD-10-CM

## 2022-02-02 DIAGNOSIS — L97.929 IDIOPATHIC CHRONIC VENOUS HYPERTENSION OF LEFT LOWER EXTREMITY WITH ULCER AND INFLAMMATION (HCC): Primary | ICD-10-CM

## 2022-02-02 DIAGNOSIS — L97.422 ULCER OF LEFT HEEL, WITH FAT LAYER EXPOSED (HCC): ICD-10-CM

## 2022-02-02 PROCEDURE — 99212 OFFICE O/P EST SF 10 MIN: CPT

## 2022-02-02 RX ORDER — SILVER SULFADIAZINE 10 G/1000G
CREAM TOPICAL ONCE
Status: CANCELLED | OUTPATIENT
Start: 2022-02-02 | End: 2022-02-02

## 2022-02-02 RX ORDER — MUPIROCIN 20 MG/G
OINTMENT TOPICAL ONCE
Status: CANCELLED | OUTPATIENT
Start: 2022-02-02 | End: 2022-02-02

## 2022-02-02 RX ORDER — BETAMETHASONE DIPROPIONATE 0.5 MG/G
OINTMENT TOPICAL ONCE
Status: CANCELLED | OUTPATIENT
Start: 2022-02-02 | End: 2022-02-02

## 2022-02-02 RX ORDER — LIDOCAINE 40 MG/G
CREAM TOPICAL ONCE
Status: CANCELLED | OUTPATIENT
Start: 2022-02-02 | End: 2022-02-02

## 2022-02-02 RX ORDER — TRIAMCINOLONE ACETONIDE 1 MG/G
OINTMENT TOPICAL ONCE
Status: CANCELLED | OUTPATIENT
Start: 2022-02-02 | End: 2022-02-02

## 2022-02-02 RX ORDER — LIDOCAINE HYDROCHLORIDE 20 MG/ML
JELLY TOPICAL ONCE
Status: CANCELLED | OUTPATIENT
Start: 2022-02-02 | End: 2022-02-02

## 2022-02-02 RX ORDER — BACITRACIN ZINC AND POLYMYXIN B SULFATE 500; 1000 [USP'U]/G; [USP'U]/G
OINTMENT TOPICAL ONCE
Status: CANCELLED | OUTPATIENT
Start: 2022-02-02 | End: 2022-02-02

## 2022-02-02 RX ORDER — BACITRACIN 500 [USP'U]/G
OINTMENT TOPICAL ONCE
Status: CANCELLED | OUTPATIENT
Start: 2022-02-02 | End: 2022-02-02

## 2022-02-02 RX ORDER — LIDOCAINE HYDROCHLORIDE 40 MG/ML
SOLUTION TOPICAL ONCE
Status: CANCELLED | OUTPATIENT
Start: 2022-02-02 | End: 2022-02-02

## 2022-02-02 RX ORDER — GENTAMICIN SULFATE 1 MG/G
OINTMENT TOPICAL ONCE
Status: CANCELLED | OUTPATIENT
Start: 2022-02-02 | End: 2022-02-02

## 2022-02-02 RX ORDER — LIDOCAINE 50 MG/G
OINTMENT TOPICAL ONCE
Status: CANCELLED | OUTPATIENT
Start: 2022-02-02 | End: 2022-02-02

## 2022-02-02 RX ORDER — CLOBETASOL PROPIONATE 0.5 MG/G
OINTMENT TOPICAL ONCE
Status: CANCELLED | OUTPATIENT
Start: 2022-02-02 | End: 2022-02-02

## 2022-02-02 NOTE — DISCHARGE INSTRUCTIONS
[address]  [address], [address]  Phone: [phone]  Fax: [phone]    Patient: Tay Prakash MRN: [id number]  SSN: [id number]    YOB: 1950  Age: 70 y.o. Sex: male       Return Appointment: as needed with Adelaida Dietrich DPM    Instructions: Wound has healed. Continue to offload with your diabetic shoes. Wear compression stockings daily. Follow up with your podiatrist.       Should you experience increased redness, swelling, pain, foul odor, size of wound(s), or have a temperature over 101 degrees please contact the [address] [place] [address] Road at [phone] or if after hours contact your primary care physician or go to the hospital emergency department.     Signed By: Shadia Watters     February 2, 2022

## 2022-02-02 NOTE — WOUND CARE
[address]  [address], [address]  Phone: [phone]  Fax: [phone]    Patient: Eugenie Arriola MRN: [id number]  SSN: [id number]    YOB: 1950  Age: 70 y.o. Sex: male       Return Appointment: as needed with French Bettencourt DPM    Instructions: Wound has healed. Continue to offload with your diabetic shoes. Wear compression stockings daily. Follow up with your podiatrist.    Should you experience increased redness, swelling, pain, foul odor, size of wound(s), or have a temperature over 101 degrees please contact the [address] [place] [address] Road at [phone] or if after hours contact your primary care physician or go to the hospital emergency department.     Signed By: Rita Kirby     February 2, 2022

## 2022-02-02 NOTE — PROGRESS NOTES
[address] [place] [place]borne, [address] RECORD NUMBER:  [id number]  AGE: 70 y.o. RACE BLACK/  GENDER: male  : 1950  EPISODE DATE:  2022    Subjective:     Chief Complaint   Patient presents with    Follow-up     left foot        Nidhi Galicia is a 70 y.o. BLACK/ male who presents with a wound to the left lower extremity at the sub 1st metatarsal head. He tolerated the compression well. HISTORY of PRESENT ILLNESS HPI    Nature: Painless  Location: left sub 1st metatarsal head  Duration:   Onset: Patient states it started as unknown  Course: improving  Aggravating/Alleviating: None reported  Treatment: acticoat, compression    Ulcer Identification:  Ulcer Type: venous, pressure and neuropathic    Contributing Factors: edema, chronic pressure and shear force    Wound: venous          PAST MEDICAL HISTORY    Past Medical History:   Diagnosis Date    Idiopathic chronic venous hypertension of left lower extremity with ulcer and inflammation ([facility] [address] 2022    Peptic ulcer disease     Ulcer of left heel, with fat layer exposed ([facility] [address] 2022        PAST SURGICAL HISTORY    Past Surgical History:   Procedure Laterality Date    HX HERNIA REPAIR      hiatal hernia repair       FAMILY HISTORY    History reviewed. No pertinent family history. SOCIAL HISTORY    Social History     Tobacco Use    Smoking status: Never Smoker    Smokeless tobacco: Former User   Substance Use Topics    Alcohol use: Yes    Drug use: Not on file       ALLERGIES    No Known Allergies    MEDICATIONS    Current Outpatient Medications on File Prior to Encounter   Medication Sig Dispense Refill    pantoprazole (Protonix) 40 mg tablet Take 40 mg by mouth two (2) times a day.  famotidine (Pepcid) 20 mg tablet Take 20 mg by mouth two (2) times a day.       chlorproMAZINE (THORAZINE) 10 mg tablet Take 10 mg by mouth three (3) times daily. No current facility-administered medications on file prior to encounter. REVIEW OF SYSTEMS    Pertinent items are noted in HPI. Objective:     Visit Vitals  /79 (BP 1 Location: Left upper arm, BP Patient Position: Sitting)   Pulse 74   Temp 97.2 °F (36.2 °C)   Resp 16   Ht 6' 2\" (1.88 m)   Wt 76.2 kg (168 lb)   SpO2 99%   BMI 21.57 kg/m²       Wt Readings from Last 3 Encounters:   02/02/22 76.2 kg (168 lb)   01/28/22 78 kg (172 lb)   01/26/22 77.6 kg (171 lb)       PHYSICAL EXAM    General: well developed, well nourished, pleasant , NAD. Hygiene good  Psych: cooperative. Pleasant. No anxiety or depression. Normal mood and affect. HEENT: Normocephalic, atraumatic. EOMI. Conjunctiva clear. No scleral icterus. Neck: Normal range of motion. No masses. Chest: Good air entry bilaterally. Respirations nonlabored  Cardio: Normal heart sounds,no rubs, murmurs or gallops  Abdomen: Soft, nontender, nondistended, normoactive bowel sounds    Vascular: DP and PT pulses are palpable at 2/4 bilateral. Skin temperature is uniform from proximal to distal bilateral. Hair growth is absent bilateral. No erythema, focal edema, heat is appreciated bilateral. No evidence of cellulitis. Derm: No paronychial infections are noted. Skin is atrophic and xerotic. No subcutaneous masses or hyperpigmented lesions are present. Neuro: Epicritic sensation is absent bilateral. Protective sensation is absent with 5.07 SWMF testing to all 10 sites bilateral. Muscle tone and bulk is symmetric bilateral.  Msk: Muscle strength is 5/5 for all prime movers of the foot bilateral. No effusions are palpable. Prior full thickness ulceration to the left sub 1st metatarsal head with epithelium to the base and hyperkeratosis tot he rim. No drainage. No pain. Significant reduction in calf circumference.      Wound Foot Left;Plantar #1 (Active)   Wound Image   02/02/22 1332   Wound Etiology Other (Comment) 02/02/22 0850   Dressing Status Clean;Dry; Intact 02/02/22 0850   Cleansed Soap and water 02/02/22 0850   Dressing/Treatment Band-Aid/Adhesive bandage 01/12/22 1054   Offloading for Diabetic Foot Ulcers Diabetic shoes/inserts 02/02/22 0850   Wound Length (cm) 0 cm 02/02/22 0850   Wound Width (cm) 0 cm 02/02/22 0850   Wound Depth (cm) 0 cm 02/02/22 0850   Wound Surface Area (cm^2) 0 cm^2 02/02/22 0850   Change in Wound Size % 100 02/02/22 0850   Wound Volume (cm^3) 0 cm^3 02/02/22 0850   Wound Healing % 100 02/02/22 0850   Post-Procedure Length (cm) 0.2 cm 01/26/22 1031   Post-Procedure Width (cm) 0.2 cm 01/26/22 1031   Post-Procedure Depth (cm) 0.1 cm 01/26/22 1031   Post-Procedure Surface Area (cm^2) 0.04 cm^2 01/26/22 1031   Post-Procedure Volume (cm^3) 0.004 cm^3 01/26/22 1031   Wound Assessment Epithelialization 02/02/22 0850   Drainage Amount None 02/02/22 0850   Drainage Description Serosanguinous 01/28/22 1115   Wound Odor None 02/02/22 0850   Ora-Wound/Incision Assessment Hyperkeratosis (Callous) 02/02/22 0850   Wound Thickness Description Full thickness 02/02/22 0850   Number of days: 21        DATA REVIEW:  Recent Results (from the past 336 hour(s))   DUPLEX LOW EXT ARTERIES WITH MAGGIE    Collection Time: 01/20/22  3:22 PM   Result Value Ref Range    Left CFA Velocity 93.2 cm/s    Right CFA .0 cm/s    Left CFA prox sys PSV 93.2 cm/s    Left Dist LORI Velocity 102.0 cm/s    Left popliteal dist sys .0 cm/s    Left popliteal prox sys PSV 87.9 cm/s    Left Dist PTA PSV 65.1 cm/s    Left Dist Peroneal Velocity 48.3 cm/s    Left super femoral dist sys .0 cm/s    Left super femoral mid sys .0 cm/s    Left super femoral prox sys .0 cm/s    Left Prox PFA A PSV 58.2 cm/s    Right CFA prox sys .0 cm/s    Right Dist LORI Velocity 86.2 cm/s    Right popliteal dist sys .0 cm/s    Right popliteal prox sys PSV 86.2 cm/s    Right Dist PTA PSV 85.3 cm/s Right Dist Peroneal Velocity 55.0 cm/s    Right super femoral dist sys PSV 93.8 cm/s    Right super femoral mid sys PSV 97.6 cm/s    Right super femoral prox sys .0 cm/s    Right Prox PFA A PSV 77.7 cm/s    Dist aortic AP 2.11 cm    Dist aortic trans 1.93 cm    Left MAGGIE 1.13     Left dorsalis pedis  mmHg    Left posterior tibial 0 mmHg    Left toe pressure 129 mmHg    Left TBI 0.82     Right MAGGIE 1.06     Right dorsalis pedis  mmHg    Right posterior tibial 159 mmHg    Right toe pressure 157 mmHg    Right TBI 0.99     Left arm  mmHg    Right arm  mmHg    Right SFA Prox Kyle Ratio 0.9     Right SFA Mid Kyle Ratio 0.9     Right SFA Dist Kyle Ratio 0.96     Right Pop A Prox Kyle Ratio 0.92     Left SFA Prox Kyle Ratio 1.34     Left SFA Mid Kyle Ratio 0.91     Left SFA Dist Kyle Ratio 1.08     Left Pop A Prox Kyle Ratio 0.71        LEFT FOOT 3 view(s).     INDICATION: Wound to bottom of left foot and ankle swelling, possible  osteomyelitis first metatarsal head     TECHNIQUE: AP and lateral and oblique views.     COMPARISON: None.     FINDINGS: No periostitis of the first metatarsal. No cortical erosions. There  are small subchondral cysts. Degenerative changes osteophytes. Osteopenia. Hammertoe deformities. Arthritis of the tibiotalar talonavicular joints.     IMPRESSION  Negative for acute osteomyelitis. Degenerative changes. .    Assessment/Plan:      Problem List Items Addressed This Visit        Circulatory    Idiopathic chronic venous hypertension of left lower extremity with ulcer and inflammation (HCC) - Primary    Relevant Orders    INITIATE OUTPATIENT WOUND CARE PROTOCOL       Other    Ulcer of left heel, with fat layer exposed ([facility] [address]    Relevant Orders    INITIATE OUTPATIENT WOUND CARE PROTOCOL         Wound has healed. He may return to normal hygiene. Wear compression daily. Return to his podiatrist for foot care. Return here as needed.      Patient instructed on the following:    Should you experience increased redness, swelling, pain, foul odor, size of wound(s), or have a temperature over 101 degrees please contact the [address] [place] [address] Road at [phone] or if after hours contact your primary care physician or go to the hospital emergency department. Orders Placed This Encounter    INITIATE OUTPATIENT WOUND CARE PROTOCOL     Cleanse wound with saline. If wound contains bioburden or contamination, cleanse with wound cleanser or antimicrobial solution. For normal periwound tissue without irritation nor maceration, apply topical skin protectant. For periwound tissue with irritation and/or maceration, apply zinc based product, topical steroid cream/ointment, or equivalent. For wounds with dry firm black eschar and/or without exudate, apply betadine and leave open to air. For wounds with scant/small to no exudate or drainage, apply wound gel, hydrocolloid, polymer, or equivalent and cover with secondary dressing/foam.    For wounds with moderate/large exudate or drainage, apply alginate, hydrofiber, polymer, or equivalent and cover with secondary dressing/foam.    For wounds with nonviable tissue requiring removal, apply chemical or mechanical debrider and cover with secondary dressing/foam.    For wounds with tunneling, dead space, or cavity, fill or pack with strip/guaze/kerlix to fit and cover with secondary dressing/foam.    For wounds with adequate granulation or epithelization, apply wound gel, hydrocolloid, polymer, collagen, or transparent film, and cover secondary dry dressing/foam.    For wounds that need additional secondary dressing to help pad or control additional drainage/exudates, add foam, absorbent pad or hydrocolloid.     For wounds with suspected or know infection, apply antimicrobial mesh and/or antimicrobial alginate/hydrofiber, or antimicrobial solution moistened gauze/kerlix, or equivalent and cover with secondary dressing foam.    Compression management needed for edema control, apply multilayer compression or tubular garment or equivalent. Offloading Management needed for pressure relief, apply offloading shoe/boot or equivalent. Standing Status:   Standing     Number of Occurrences:   1       Follow-up Information    None        MDM  Number of Diagnoses or Management Options  Idiopathic chronic venous hypertension of left lower extremity with ulcer and inflammation ([address]): established, improving  Ulcer of left heel, with fat layer exposed ([address]): established, improving  Risk of Complications, Morbidity, and/or Mortality  Presenting problems: minimal  Management options: minimal    Patient Progress  Patient progress: resolved      Treatment Note please see attached Discharge Instructions    Written patient dismissal instructions given to patient or POA.          Electronically signed by Lili Cadena DPM on 2/2/2022 at 11:51 AM

## 2022-02-02 NOTE — WOUND CARE
02/02/22 0850   Wound Foot Left;Plantar #1   Date First Assessed/Time First Assessed: 01/12/22 1053   Present on Hospital Admission: Yes  Primary Wound Type: Neuropathic  Location: (c) Foot  Wound Location Orientation: Left;Plantar  Wound Description: #1   Wound Image    Wound Etiology Other (Comment)  (neuropathic)   Dressing Status Clean;Dry; Intact   Cleansed Soap and water   Dressing/Treatment   (acticoat. coflex)   Offloading for Diabetic Foot Ulcers Diabetic shoes/inserts   Wound Length (cm) 0 cm   Wound Width (cm) 0 cm   Wound Depth (cm) 0 cm   Wound Surface Area (cm^2) 0 cm^2   Change in Wound Size % 100   Wound Volume (cm^3) 0 cm^3   Wound Healing % 100   Wound Assessment Epithelialization   Drainage Amount None   Wound Odor None   Ora-Wound/Incision Assessment Hyperkeratosis (Callous)   Wound Thickness Description Full thickness

## 2022-03-02 ENCOUNTER — HOSPITAL ENCOUNTER (OUTPATIENT)
Dept: WOUND CARE | Age: 72
Discharge: HOME | End: 2022-03-02
Attending: PODIATRIST
Payer: MEDICARE

## 2022-03-02 VITALS
HEART RATE: 88 BPM | BODY MASS INDEX: 21.05 KG/M2 | HEIGHT: 74 IN | RESPIRATION RATE: 18 BRPM | TEMPERATURE: 97.8 F | DIASTOLIC BLOOD PRESSURE: 72 MMHG | WEIGHT: 164 LBS | OXYGEN SATURATION: 98 % | SYSTOLIC BLOOD PRESSURE: 125 MMHG

## 2022-03-02 DIAGNOSIS — L97.422 ULCER OF LEFT HEEL, WITH FAT LAYER EXPOSED (HCC): ICD-10-CM

## 2022-03-02 DIAGNOSIS — I87.332 IDIOPATHIC CHRONIC VENOUS HYPERTENSION OF LEFT LOWER EXTREMITY WITH ULCER AND INFLAMMATION (HCC): ICD-10-CM

## 2022-03-02 DIAGNOSIS — L97.929 IDIOPATHIC CHRONIC VENOUS HYPERTENSION OF LEFT LOWER EXTREMITY WITH ULCER AND INFLAMMATION (HCC): ICD-10-CM

## 2022-03-02 PROCEDURE — 11042 DBRDMT SUBQ TIS 1ST 20SQCM/<: CPT

## 2022-03-02 NOTE — WOUND CARE
03/02/22 0902   Wound Foot Left;Plantar #1   Date First Assessed/Time First Assessed: 01/12/22 1053   Present on Hospital Admission: Yes  Primary Wound Type: Neuropathic  Location: (c) Foot  Wound Location Orientation: Left;Plantar  Wound Description: #1   Wound Image    Wound Etiology   (neuropathic)   Dressing Status Old drainage noted   Cleansed Cleansed with saline   Dressing/Treatment   (band-aid)   Offloading for Diabetic Foot Ulcers Diabetic shoes/inserts   Wound Length (cm) 0.5 cm   Wound Width (cm) 0.2 cm   Wound Depth (cm) 0.5 cm   Wound Surface Area (cm^2) 0.1 cm^2   Change in Wound Size % 66.67   Wound Volume (cm^3) 0.05 cm^3   Wound Healing % -67   Wound Assessment Pale granulation tissue;Slough   Drainage Amount Moderate   Drainage Description Serosanguinous   Wound Odor Mild   Oar-Wound/Incision Assessment Blisters; Hyperkeratosis (Callous); Maceration   Wound Thickness Description Full thickness

## 2022-03-02 NOTE — DISCHARGE INSTRUCTIONS
Left Foot and Left Great Toe:\  Cleanse wounds with saline. DO NOT GET WET IN SHOWER, USE CAST COVER. Apply Acticoat Flex 3: cut top approximate size of wound, apply to wound bed. Cover with Optilock pad on foot and gauze and rolled gauze on toe. Apply 2 layer compression with wound and lower extremity assessment to left leg. If there is any problem with the dressing (too tight, slides down, etc.) Patient to return to wound clinic to have re-wrapped by clinician. Change 2 times a week. -Keep appt with Dr. Delisa Quintero next week for adjustment to inserts.

## 2022-03-02 NOTE — WOUND CARE
Multilayer Compression Wrap   (Not Unna) Below the Knee    NAME:  Sim Mchugh  YOB: 1950  MEDICAL RECORD NUMBER:  [id number]  DATE:  3/2/2022    Applied moisturizing agent to dry skin as needed. Applied primary and secondary dressing as ordered. Applied multilayered dressing below the knee to left lower leg. Instructed patient/caregiver not to remove dressing and to keep it clean and dry. Instructed patient/caregiver on complications to report to provider, such as pain, numbness in toes, heavy drainage, and slippage of dressing. Instructed patient on purpose of compression dressing and on activity and exercise recommendations.     Response to treatment: Well tolerated by patient       Electronically signed by Mega Cooney RN on 3/2/2022 at 9:36 AM

## 2022-03-02 NOTE — WOUND CARE
[address]  [address], [address]   Phone: [phone]  Fax: [phone]    Patient: Tuyet Lyons MRN: [id number]  SSN: [id number]    YOB: 1950  Age: 70 y.o. Sex: male       Return Appointment: 1 week with Cruzito Costello DPM   Return Appointment: Friday with Clinician      Instructions:   Left Foot and Left Great Toe:\  Cleanse wounds with saline. DO NOT GET WET IN SHOWER, USE CAST COVER. Apply Acticoat Flex 3: cut top approximate size of wound, apply to wound bed. Cover with Optilock pad on foot and gauze and rolled gauze on toe. Apply 2 layer compression with wound and lower extremity assessment to left leg. If there is any problem with the dressing (too tight, slides down, etc.) Patient to return to wound clinic to have re-wrapped by clinician. Change 2 times a week. -Keep appt with Dr. Marisel Farah next week for adjustment to inserts. Should you experience increased redness, swelling, pain, foul odor, size of wound(s), or have a temperature over 101 degrees please contact the [address] at [phone] or if after hours contact your primary care physician or go to the hospital emergency department.     Signed By: Gail Ruiz RN     March 2, 2022

## 2022-03-02 NOTE — PROGRESS NOTES
[facility] [address] [facility] [address] RECORD NUMBER:  [id number]  AGE: 70 y.o. RACE BLACK/  GENDER: male  : 1950  EPISODE DATE:  3/2/2022    Subjective:     Chief Complaint   Patient presents with    Follow-up     left foot        Nayla Napoles is a 70 y.o. BLACK/ male who presents with a recurrent wound to the left lower extremity at the sub 1st metatarsal head. He noted drainage begin 1-2 weeks ago. He also notes a fluid filled blister to the distal hallux. He is wearing his compression stockings daily. He has an appointment with his podiatrist next Thursday. He has diabetic insoles that he wears in [employer]. HISTORY of PRESENT ILLNESS HPI    Nature: Painless  Location: left sub 1st metatarsal head, left distal hallux  Duration:   Onset: Patient states it started as unknown  Course: recurrent  Aggravating/Alleviating: None reported  Treatment: none    Ulcer Identification:  Ulcer Type: venous, pressure and neuropathic    Contributing Factors: edema, chronic pressure and shear force    Wound: venous          PAST MEDICAL HISTORY    Past Medical History:   Diagnosis Date    Idiopathic chronic venous hypertension of left lower extremity with ulcer and inflammation ([facility] 2022    Peptic ulcer disease     Ulcer of left heel, with fat layer exposed ([facility] [address] 2022        PAST SURGICAL HISTORY    Past Surgical History:   Procedure Laterality Date    HX HERNIA REPAIR      hiatal hernia repair       FAMILY HISTORY    History reviewed. No pertinent family history. SOCIAL HISTORY    Social History     Tobacco Use    Smoking status: Never Smoker    Smokeless tobacco: Former User   Substance Use Topics    Alcohol use:  Yes    Drug use: Not on file       ALLERGIES    No Known Allergies    MEDICATIONS    Current Outpatient Medications on File Prior to Encounter   Medication Sig Dispense Refill    pantoprazole (Protonix) 40 mg tablet Take 40 mg by mouth two (2) times a day.  famotidine (Pepcid) 20 mg tablet Take 20 mg by mouth two (2) times a day.  chlorproMAZINE (THORAZINE) 10 mg tablet Take 10 mg by mouth three (3) times daily. No current facility-administered medications on file prior to encounter. REVIEW OF SYSTEMS    Pertinent items are noted in HPI. Objective:     Visit Vitals  /72 (BP 1 Location: Left upper arm, BP Patient Position: Sitting)   Pulse 88   Temp 97.8 °F (36.6 °C)   Resp 18   Ht 6' 2\" (1.88 m)   Wt 74.4 kg (164 lb)   SpO2 98%   BMI 21.06 kg/m²       Wt Readings from Last 3 Encounters:   03/02/22 74.4 kg (164 lb)   02/02/22 76.2 kg (168 lb)   01/28/22 78 kg (172 lb)       PHYSICAL EXAM    General: well developed, well nourished, pleasant , NAD. Hygiene good  Psych: cooperative. Pleasant. No anxiety or depression. Normal mood and affect. HEENT: Normocephalic, atraumatic. EOMI. Conjunctiva clear. No scleral icterus. Neck: Normal range of motion. No masses. Chest: Good air entry bilaterally. Respirations nonlabored  Cardio: Normal heart sounds,no rubs, murmurs or gallops  Abdomen: Soft, nontender, nondistended, normoactive bowel sounds    Vascular: DP and PT pulses are palpable at 2/4 bilateral. Skin temperature is uniform from proximal to distal bilateral. Hair growth is absent bilateral. No erythema, focal edema, heat is appreciated bilateral. No evidence of cellulitis. Derm: No paronychial infections are noted. Skin is atrophic and xerotic. No subcutaneous masses or hyperpigmented lesions are present. Neuro: Epicritic sensation is absent bilateral. Protective sensation is absent with 5.07 SWMF testing to all 10 sites bilateral. Muscle tone and bulk is symmetric bilateral.  Msk: Muscle strength is 5/5 for all prime movers of the foot bilateral. No effusions are palpable.       Full thickness ulceration to the sub 1st metatarsal head left foot. Heavy callusing to the rim. Mixed fibrogranular bed. No odor or purulence. No erythema or edema. Copious serous drainage. Sof tend feel - no bone exposed or palpable. Fluid filled bulla to the distal plantar left hallux. Upon debridement there is an underlying full thickness ulceration Heavy callusing to the rim. Granular bed. No odor or purulence. No erythema or edema. Sof tend feel - no bone exposed or palpable. Wound Foot Left;Plantar #1 (Active)   Wound Image    03/02/22 0902   Wound Etiology Other (Comment) 02/02/22 0850   Dressing Status Old drainage noted 03/02/22 0902   Cleansed Cleansed with saline 03/02/22 0902   Dressing/Treatment Band-Aid/Adhesive bandage 01/12/22 1054   Offloading for Diabetic Foot Ulcers Diabetic shoes/inserts 03/02/22 0902   Wound Length (cm) 0.5 cm 03/02/22 0902   Wound Width (cm) 0.2 cm 03/02/22 0902   Wound Depth (cm) 0.5 cm 03/02/22 0902   Wound Surface Area (cm^2) 0.1 cm^2 03/02/22 0902   Change in Wound Size % 66.67 03/02/22 0902   Wound Volume (cm^3) 0.05 cm^3 03/02/22 0902   Wound Healing % -67 03/02/22 0902   Post-Procedure Length (cm) 1 cm 03/02/22 0902   Post-Procedure Width (cm) 0.7 cm 03/02/22 0902   Post-Procedure Depth (cm) 0.2 cm 03/02/22 0902   Post-Procedure Surface Area (cm^2) 0.7 cm^2 03/02/22 0902   Post-Procedure Volume (cm^3) 0.14 cm^3 03/02/22 0902   Undermining Starts ___ O'Clock 1 o'clock 03/02/22 0902   Undermining Ends ___ O'Clock 12 o'clock 03/02/22 0902   Undermining Maximum Distance (cm) 0.7 cm 03/02/22 0902   Wound Assessment Pale granulation tissue;Slough 03/02/22 0902   Drainage Amount Moderate 03/02/22 0902   Drainage Description Serosanguinous 03/02/22 0902   Wound Odor Mild 03/02/22 0902   Ora-Wound/Incision Assessment Blisters; Hyperkeratosis (Callous); Maceration 03/02/22 0902   Wound Thickness Description Full thickness 03/02/22 0902   Number of days: 49       Wound Toe (Comment  which one) Keith kendall Toe (Active)   Wound Image    03/02/22 0902   Dressing/Treatment Open to air 03/02/22 0902   Wound Length (cm) 1.5 cm 03/02/22 0902   Wound Width (cm) 2 cm 03/02/22 0902   Wound Depth (cm) 0 cm 03/02/22 0902   Wound Surface Area (cm^2) 3 cm^2 03/02/22 0902   Wound Volume (cm^3) 0 cm^3 03/02/22 0902   Post-Procedure Length (cm) 0.7 cm 03/02/22 0902   Post-Procedure Width (cm) 0.6 cm 03/02/22 0902   Post-Procedure Depth (cm) 0.1 cm 03/02/22 0902   Post-Procedure Surface Area (cm^2) 0.42 cm^2 03/02/22 0902   Post-Procedure Volume (cm^3) 0.042 cm^3 03/02/22 0902   Wound Assessment Fluid filled blister 03/02/22 0902   Ora-Wound/Incision Assessment Blisters; Ecchymosis 03/02/22 0902   Number of days: 0            DATA REVIEW:  No results found for this or any previous visit (from the past 336 hour(s)). LEFT FOOT 3 view(s).     INDICATION: Wound to bottom of left foot and ankle swelling, possible  osteomyelitis first metatarsal head     TECHNIQUE: AP and lateral and oblique views.     COMPARISON: None.     FINDINGS: No periostitis of the first metatarsal. No cortical erosions. There  are small subchondral cysts. Degenerative changes osteophytes. Osteopenia. Hammertoe deformities. Arthritis of the tibiotalar talonavicular joints.     IMPRESSION  Negative for acute osteomyelitis. Degenerative changes. .    Assessment/Plan:      Problem List Items Addressed This Visit        Circulatory    Idiopathic chronic venous hypertension of left lower extremity with ulcer and inflammation (HCC)       Other    Ulcer of left heel, with fat layer exposed ([address]            Debridement Wound Care        Problem List Items Addressed This Visit        Circulatory    Idiopathic chronic venous hypertension of left lower extremity with ulcer and inflammation (HCC)       Other    Ulcer of left heel, with fat layer exposed ([address]          Procedure Note  Indications:  Based on my examination of this patient's wound(s)/ulcer(s) today, debridement is required to promote healing and evaluate the wound base. Performed by: Shelly Marsh DPM    Consent obtained: Yes    Time out taken: Yes    Debridement: Other surgical    Using forceps and # 15 blade scalpel the wound(s)/ulcer(s) was/were sharply debrided down through and including the removal of    subcutaneous tissue    Devitalized Tissue Debrided: fibrin, biofilm, exudate and callus    Pre Debridement Measurements:  Are located in the Wound/Ulcer Documentation Flow Sheet    Pressure ulcer, Stage 3    Wound/Ulcer #: left foot sub 1st metatarsal head and distal hallux    Post Debridement Measurements:  Wound/Ulcer Descriptions are Pre Debridement except measurements: see flowsheet    Total Surface Area Debrided:  1.2 sq cm     Estimated Blood Loss:  Minimal     Hemostasis Achieved: Pressure    Procedural Pain: 0 / 10     Post Procedural Pain: 0 / 10     Response to treatment: Well tolerated by patient     Patient examined and evaluated. Educated patient and barriers to healing. Wounds are secondary to the rigidly contracted digits 1-5. Pressure reduction is paramount due to location of the wound. Either external reduction via insoles and padding or internal reduction via surgical correction of the toe deformity will be required to heal the current wounds and prevent recurrence. He has an appointment with his podiatrist next Thursday - he will discuss new orthotics and surgery at that time. Infection control is required to prevent loss of limb/life - maintain dressing coverage at all times, do not soak or get wet in the shower, wash hands before and after dressing changes. Proper nutrition to support skin growth includes increased protein, vitamins and amino acids - animal based foods and Oliver recommended. Moisture management to prevent maceration and dryness - keep wound covered at all times outside dressing care, do not \"air out\" or soak. Dressing will consist of acticoat.      Edema control provided by use of compression therapy as he responded well in the past to this. Present here in 2 days for dressing change and in 1 week with me. Patient instructed on the following:    Should you experience increased redness, swelling, pain, foul odor, size of wound(s), or have a temperature over 101 degrees please contact the [address] [place] [address] Road at [phone] or if after hours contact your primary care physician or go to the hospital emergency department. No orders of the defined types were placed in this encounter. Follow-up Information     Follow up With Specialties Details Why Contact Info    [address] In 1 week Wednesday [address]    [address]  [address] [address]  [phone]       Treatment Note please see attached Discharge Instructions    Written patient dismissal instructions given to patient or POA.          Electronically signed by You Umana DPM on 3/2/2022 at 11:51 AM

## 2022-03-02 NOTE — WOUND CARE
03/02/22 0902   Wound Foot Left;Plantar #1   Date First Assessed/Time First Assessed: 01/12/22 1053   Present on Hospital Admission: Yes  Primary Wound Type: Neuropathic  Location: (c) Foot  Wound Location Orientation: Left;Plantar  Wound Description: #1   Wound Image    Wound Etiology   (neuropathic)   Dressing Status Old drainage noted   Cleansed Cleansed with saline   Dressing/Treatment   (band-aid)   Offloading for Diabetic Foot Ulcers Diabetic shoes/inserts   Wound Length (cm) 0.5 cm   Wound Width (cm) 0.2 cm   Wound Depth (cm) 0.5 cm   Wound Surface Area (cm^2) 0.1 cm^2   Change in Wound Size % 66.67   Wound Volume (cm^3) 0.05 cm^3   Wound Healing % -67   Wound Assessment Pale granulation tissue;Slough   Drainage Amount Moderate   Drainage Description Serosanguinous   Wound Odor Mild   Ora-Wound/Incision Assessment Blisters; Hyperkeratosis (Callous); Maceration   Wound Thickness Description Full thickness   Wound Toe (Comment  which one) Great Toe   Date First Assessed/Time First Assessed: 03/02/22 0904   Present on Hospital Admission: Yes  Primary Wound Type: Blister/bullae  Location: Toe (Comment  which one)  Wound Description: Great Toe   Wound Image    Wound Etiology   (blister, neuropathic)   Dressing/Treatment Open to air   Wound Length (cm) 1.5 cm   Wound Width (cm) 2 cm   Wound Depth (cm) 0 cm   Wound Surface Area (cm^2) 3 cm^2   Wound Volume (cm^3) 0 cm^3   Wound Assessment Fluid filled blister   Ora-Wound/Incision Assessment Blisters; Ecchymosis

## 2022-03-02 NOTE — WOUND CARE
03/02/22 0902   Wound Foot Left;Plantar #1   Date First Assessed/Time First Assessed: 01/12/22 1053   Present on Hospital Admission: Yes  Primary Wound Type: Neuropathic  Location: (c) Foot  Wound Location Orientation: Left;Plantar  Wound Description: #1   Wound Image    Wound Etiology   (neuropathic)   Dressing Status Old drainage noted   Cleansed Cleansed with saline   Dressing/Treatment   (band-aid)   Offloading for Diabetic Foot Ulcers Diabetic shoes/inserts   Wound Length (cm) 0.5 cm   Wound Width (cm) 0.2 cm   Wound Depth (cm) 0.5 cm   Wound Surface Area (cm^2) 0.1 cm^2   Change in Wound Size % 66.67   Wound Volume (cm^3) 0.05 cm^3   Wound Healing % -67   Undermining Starts ___ O'Clock 1 o'clock   Undermining Ends ___ O'Clock 12 o'clock   Undermining Maximum Distance (cm) 0.7 cm   Wound Assessment Pale granulation tissue;Slough   Drainage Amount Moderate   Drainage Description Serosanguinous   Wound Odor Mild   Ora-Wound/Incision Assessment Blisters; Hyperkeratosis (Callous); Maceration   Wound Thickness Description Full thickness   Wound Toe (Comment  which one) Great Toe   Date First Assessed/Time First Assessed: 03/02/22 0904   Present on Hospital Admission: Yes  Primary Wound Type: Blister/bullae  Location: Toe (Comment  which one)  Wound Description: Great Toe   Wound Image    Wound Etiology   (blister, neuropathic)   Dressing/Treatment Open to air   Wound Length (cm) 1.5 cm   Wound Width (cm) 2 cm   Wound Depth (cm) 0 cm   Wound Surface Area (cm^2) 3 cm^2   Wound Volume (cm^3) 0 cm^3   Wound Assessment Fluid filled blister   Ora-Wound/Incision Assessment Blisters; Ecchymosis

## 2022-03-04 ENCOUNTER — HOSPITAL ENCOUNTER (OUTPATIENT)
Dept: WOUND CARE | Age: 72
Discharge: HOME | End: 2022-03-04
Attending: PODIATRIST
Payer: MEDICARE

## 2022-03-04 VITALS
TEMPERATURE: 97.9 F | HEART RATE: 119 BPM | DIASTOLIC BLOOD PRESSURE: 87 MMHG | SYSTOLIC BLOOD PRESSURE: 125 MMHG | RESPIRATION RATE: 18 BRPM

## 2022-03-04 PROCEDURE — 29581 APPL MULTLAYER CMPRN SYS LEG: CPT

## 2022-03-04 NOTE — WOUND CARE
03/04/22 0828   Wound Toe (Comment  which one) Great Toe   Date First Assessed/Time First Assessed: 03/02/22 0904   Present on Hospital Admission: Yes  Primary Wound Type: Blister/bullae  Location: Toe (Comment  which one)  Wound Description: Great Toe   Wound Image    Wound Etiology Other (Comment)  (neuropathic)   Dressing Status Intact   Cleansed Cleansed with saline   Dressing/Treatment   (acticoat, 2x2, gauze)   Wound Length (cm) 0.5 cm   Wound Width (cm) 0.6 cm   Wound Depth (cm) 0.1 cm   Wound Surface Area (cm^2) 0.3 cm^2   Change in Wound Size % 90   Wound Volume (cm^3) 0.03 cm^3   Wound Assessment Hyper granulation tissue   Drainage Amount Small   Drainage Description Serous   Wound Odor None   Ora-Wound/Incision Assessment Intact   Wound Thickness Description Full thickness   Wound Foot Left;Plantar #1   Date First Assessed/Time First Assessed: 01/12/22 1053   Present on Hospital Admission: Yes  Primary Wound Type: Neuropathic  Location: (c) Foot  Wound Location Orientation: Left;Plantar  Wound Description: #1   Wound Image    Wound Etiology Other (Comment)  (neuropathic)   Dressing Status Clean; Intact   Cleansed Cleansed with saline   Dressing/Treatment   (acticoat, 2x2, gauze)   Offloading for Diabetic Foot Ulcers Diabetic shoes/inserts   Wound Length (cm) 0.5 cm   Wound Width (cm) 0.6 cm   Wound Depth (cm) 0.1 cm   Wound Surface Area (cm^2) 0.3 cm^2   Change in Wound Size % 0   Wound Volume (cm^3) 0.03 cm^3   Wound Healing % 0   Wound Assessment Granulation tissue   Drainage Amount Small   Drainage Description Serosanguinous   Wound Odor None   Ora-Wound/Incision Assessment Intact   Wound Thickness Description Full thickness

## 2022-03-04 NOTE — WOUND CARE
Multilayer Compression Wrap   (Not Unna) Below the Knee    NAME:  Moises Gomez OF BIRTH:  1950  MEDICAL RECORD NUMBER:  [id number]  DATE:  3/4/2022    Removed old Multilayer wrap if indicated and wash leg with mild soap/water. Applied moisturizing agent to dry skin as needed. Applied primary and secondary dressing as ordered. Applied multilayered dressing below the knee to left lower leg. Instructed patient/caregiver not to remove dressing and to keep it clean and dry. Instructed patient/caregiver on complications to report to provider, such as pain, numbness in toes, heavy drainage, and slippage of dressing. Instructed patient on purpose of compression dressing and on activity and exercise recommendations.     Response to treatment: Well tolerated by patient       Electronically signed by Kait Medellin RN on 3/4/2022 at 8:32 AM

## 2022-03-09 ENCOUNTER — HOSPITAL ENCOUNTER (OUTPATIENT)
Dept: WOUND CARE | Age: 72
Discharge: HOME | End: 2022-03-09
Attending: PODIATRIST
Payer: MEDICARE

## 2022-03-09 VITALS
BODY MASS INDEX: 21.69 KG/M2 | SYSTOLIC BLOOD PRESSURE: 141 MMHG | TEMPERATURE: 98.4 F | DIASTOLIC BLOOD PRESSURE: 88 MMHG | HEIGHT: 74 IN | WEIGHT: 169 LBS | HEART RATE: 89 BPM | RESPIRATION RATE: 18 BRPM

## 2022-03-09 DIAGNOSIS — L89.893 PRESSURE INJURY OF LEFT FOOT, STAGE 3 (HCC): Primary | ICD-10-CM

## 2022-03-09 PROCEDURE — 99213 OFFICE O/P EST LOW 20 MIN: CPT

## 2022-03-09 RX ORDER — CLOBETASOL PROPIONATE 0.5 MG/G
OINTMENT TOPICAL ONCE
Status: CANCELLED | OUTPATIENT
Start: 2022-03-09 | End: 2022-03-09

## 2022-03-09 RX ORDER — LIDOCAINE HYDROCHLORIDE 20 MG/ML
JELLY TOPICAL ONCE
Status: CANCELLED | OUTPATIENT
Start: 2022-03-09 | End: 2022-03-09

## 2022-03-09 RX ORDER — LIDOCAINE HYDROCHLORIDE 40 MG/ML
SOLUTION TOPICAL ONCE
Status: CANCELLED | OUTPATIENT
Start: 2022-03-09 | End: 2022-03-09

## 2022-03-09 RX ORDER — GENTAMICIN SULFATE 1 MG/G
OINTMENT TOPICAL ONCE
Status: CANCELLED | OUTPATIENT
Start: 2022-03-09 | End: 2022-03-09

## 2022-03-09 RX ORDER — SILVER SULFADIAZINE 10 G/1000G
CREAM TOPICAL ONCE
Status: CANCELLED | OUTPATIENT
Start: 2022-03-09 | End: 2022-03-09

## 2022-03-09 RX ORDER — MUPIROCIN 20 MG/G
OINTMENT TOPICAL ONCE
Status: CANCELLED | OUTPATIENT
Start: 2022-03-09 | End: 2022-03-09

## 2022-03-09 RX ORDER — LIDOCAINE 40 MG/G
CREAM TOPICAL ONCE
Status: CANCELLED | OUTPATIENT
Start: 2022-03-09 | End: 2022-03-09

## 2022-03-09 RX ORDER — LIDOCAINE 50 MG/G
OINTMENT TOPICAL ONCE
Status: CANCELLED | OUTPATIENT
Start: 2022-03-09 | End: 2022-03-09

## 2022-03-09 RX ORDER — BACITRACIN ZINC AND POLYMYXIN B SULFATE 500; 1000 [USP'U]/G; [USP'U]/G
OINTMENT TOPICAL ONCE
Status: CANCELLED | OUTPATIENT
Start: 2022-03-09 | End: 2022-03-09

## 2022-03-09 RX ORDER — BACITRACIN 500 [USP'U]/G
OINTMENT TOPICAL ONCE
Status: CANCELLED | OUTPATIENT
Start: 2022-03-09 | End: 2022-03-09

## 2022-03-09 RX ORDER — TRIAMCINOLONE ACETONIDE 1 MG/G
OINTMENT TOPICAL ONCE
Status: CANCELLED | OUTPATIENT
Start: 2022-03-09 | End: 2022-03-09

## 2022-03-09 RX ORDER — BETAMETHASONE DIPROPIONATE 0.5 MG/G
OINTMENT TOPICAL ONCE
Status: CANCELLED | OUTPATIENT
Start: 2022-03-09 | End: 2022-03-09

## 2022-03-09 NOTE — PROGRESS NOTES
[address], [address] RECORD NUMBER:  [id number]  AGE: 70 y.o. RACE BLACK/  GENDER: male  : 1950  EPISODE DATE:  3/9/2022    Subjective:     Chief Complaint   Patient presents with    Follow-up     left plantar foot and great toe        Philippe Kolb is a 70 y.o. BLACK/ male who presents with a recurrent wound to the left lower extremity at the sub 1st metatarsal head. He is tolerating the compression well. He is seeing his podiatrist tomorrow to discuss offloading and surgery though he states he does not want surgery. HISTORY of PRESENT ILLNESS HPI    Nature: Painless  Location: left sub 1st metatarsal head, left distal hallux  Duration:   Onset: Patient states it started as unknown  Course: improving  Aggravating/Alleviating: None reported  Treatment: acticoat, compression    Ulcer Identification:  Ulcer Type: venous, pressure and neuropathic    Contributing Factors: edema, chronic pressure and shear force    Wound: venous          PAST MEDICAL HISTORY    Past Medical History:   Diagnosis Date    Idiopathic chronic venous hypertension of left lower extremity with ulcer and inflammation ([address] 2022    Peptic ulcer disease     Pressure injury of left foot, stage 3 ([address] 3/9/2022    Ulcer of left heel, with fat layer exposed ([address] 2022        PAST SURGICAL HISTORY    Past Surgical History:   Procedure Laterality Date    HX HERNIA REPAIR      hiatal hernia repair       FAMILY HISTORY    History reviewed. No pertinent family history. SOCIAL HISTORY    Social History     Tobacco Use    Smoking status: Never Smoker    Smokeless tobacco: Former User   Substance Use Topics    Alcohol use:  Yes    Drug use: Not on file       ALLERGIES    No Known Allergies    MEDICATIONS    Current Outpatient Medications on File Prior to Encounter Medication Sig Dispense Refill    pantoprazole (Protonix) 40 mg tablet Take 40 mg by mouth two (2) times a day.  famotidine (Pepcid) 20 mg tablet Take 20 mg by mouth two (2) times a day.  chlorproMAZINE (THORAZINE) 10 mg tablet Take 10 mg by mouth three (3) times daily. No current facility-administered medications on file prior to encounter. REVIEW OF SYSTEMS    Pertinent items are noted in HPI. Objective:     Visit Vitals  BP (!) 141/88 (BP 1 Location: Right upper arm, BP Patient Position: At rest)   Pulse 89   Temp 98.4 °F (36.9 °C)   Resp 18   Ht 6' 2\" (1.88 m)   Wt 76.7 kg (169 lb)   BMI 21.70 kg/m²       Wt Readings from Last 3 Encounters:   03/09/22 76.7 kg (169 lb)   03/02/22 74.4 kg (164 lb)   02/02/22 76.2 kg (168 lb)       PHYSICAL EXAM    General: well developed, well nourished, pleasant , NAD. Hygiene good  Psych: cooperative. Pleasant. No anxiety or depression. Normal mood and affect. HEENT: Normocephalic, atraumatic. EOMI. Conjunctiva clear. No scleral icterus. Neck: Normal range of motion. No masses. Chest: Good air entry bilaterally. Respirations nonlabored  Cardio: Normal heart sounds,no rubs, murmurs or gallops  Abdomen: Soft, nontender, nondistended, normoactive bowel sounds    Vascular: DP and PT pulses are palpable at 2/4 bilateral. Skin temperature is uniform from proximal to distal bilateral. Hair growth is absent bilateral. No erythema, focal edema, heat is appreciated bilateral. No evidence of cellulitis. Derm: No paronychial infections are noted. Skin is atrophic and xerotic. No subcutaneous masses or hyperpigmented lesions are present. Neuro: Epicritic sensation is absent bilateral. Protective sensation is absent with 5.07 SWMF testing to all 10 sites bilateral. Muscle tone and bulk is symmetric bilateral.  Msk: Muscle strength is 5/5 for all prime movers of the foot bilateral. No effusions are palpable.       Full thickness ulceration to the sub 1st metatarsal head left foot. Thick callusing to the rim. Dry granular bed. No odor or purulence. No erythema or edema. Copious serous drainage. Sof tend feel - no bone exposed or palpable. Full thickness ulceration to the distal plantar left hallux. Thick callusing to the rim. Granular bed. No odor or purulence. No erythema or edema. Soft end feel - no bone exposed or palpable. Wound Foot Left;Plantar #1 (Active)   Wound Image   03/09/22 0922   Wound Etiology Other (Comment) 03/09/22 0922   Dressing Status Clean; Intact 03/09/22 0922   Cleansed Cleansed with saline 03/09/22 0922   Dressing/Treatment Band-Aid/Adhesive bandage 01/12/22 1054   Offloading for Diabetic Foot Ulcers Diabetic shoes/inserts 03/04/22 0828   Wound Length (cm) 0.2 cm 03/09/22 0922   Wound Width (cm) 0.2 cm 03/09/22 0922   Wound Depth (cm) 0.1 cm 03/09/22 0922   Wound Surface Area (cm^2) 0.04 cm^2 03/09/22 0922   Change in Wound Size % 86.67 03/09/22 0922   Wound Volume (cm^3) 0.004 cm^3 03/09/22 0922   Wound Healing % 87 03/09/22 0922   Post-Procedure Length (cm) 1 cm 03/02/22 0902   Post-Procedure Width (cm) 0.7 cm 03/02/22 0902   Post-Procedure Depth (cm) 0.2 cm 03/02/22 0902   Post-Procedure Surface Area (cm^2) 0.7 cm^2 03/02/22 0902   Post-Procedure Volume (cm^3) 0.14 cm^3 03/02/22 0902   Undermining Starts ___ O'Clock 1 o'clock 03/02/22 0902   Undermining Ends ___ O'Clock 12 o'clock 03/02/22 0902   Undermining Maximum Distance (cm) 0.7 cm 03/02/22 0902   Wound Assessment Epithelialization 03/09/22 0922   Drainage Amount None 03/09/22 0922   Drainage Description Serous 03/09/22 0922   Wound Odor None 03/09/22 0922   Ora-Wound/Incision Assessment Intact 03/09/22 0922   Wound Thickness Description Full thickness 03/09/22 0922   Number of days: 56       Wound Toe (Comment  which one) Great Toe (Active)   Wound Image   03/09/22 0922   Wound Etiology Other (Comment) 03/09/22 0922   Dressing Status Intact 03/09/22 0922   Cleansed Cleansed with saline 03/09/22 0922   Dressing/Treatment Open to air 03/02/22 0902   Wound Length (cm) 0.5 cm 03/09/22 0922   Wound Width (cm) 0.4 cm 03/09/22 0922   Wound Depth (cm) 0.1 cm 03/09/22 0922   Wound Surface Area (cm^2) 0.2 cm^2 03/09/22 0922   Change in Wound Size % 93.33 03/09/22 0922   Wound Volume (cm^3) 0.02 cm^3 03/09/22 0922   Post-Procedure Length (cm) 0.7 cm 03/02/22 0902   Post-Procedure Width (cm) 0.6 cm 03/02/22 0902   Post-Procedure Depth (cm) 0.1 cm 03/02/22 0902   Post-Procedure Surface Area (cm^2) 0.42 cm^2 03/02/22 0902   Post-Procedure Volume (cm^3) 0.042 cm^3 03/02/22 0902   Wound Assessment Granulation tissue 03/09/22 0922   Drainage Amount Scant 03/09/22 0922   Drainage Description Serous 03/09/22 0922   Wound Odor None 03/09/22 0922   Ora-Wound/Incision Assessment Intact 03/09/22 0922   Wound Thickness Description Full thickness 03/09/22 0922   Number of days: 7      DATA REVIEW:  No results found for this or any previous visit (from the past 336 hour(s)). LEFT FOOT 3 view(s).     INDICATION: Wound to bottom of left foot and ankle swelling, possible  osteomyelitis first metatarsal head     TECHNIQUE: AP and lateral and oblique views.     COMPARISON: None.     FINDINGS: No periostitis of the first metatarsal. No cortical erosions. There  are small subchondral cysts. Degenerative changes osteophytes. Osteopenia. Hammertoe deformities. Arthritis of the tibiotalar talonavicular joints.     IMPRESSION  Negative for acute osteomyelitis. Degenerative changes. .    Assessment/Plan:      Problem List Items Addressed This Visit        Integumentary    Pressure injury of left foot, stage 3 ([facility] - Primary    Relevant Orders    INITIATE OUTPATIENT WOUND CARE PROTOCOL        Patient examined and evaluated. Educated patient and barriers to healing. Wounds are secondary to the rigidly contracted digits 1-5.  Keep appointment with his podiatrist tomorrow to discuss improved offloading via padding, orthotics or surgery. Pressure reduction is paramount due to location of the wound. Either external reduction via insoles and padding or internal reduction via surgical correction of the toe deformity will be required to heal the current wounds and prevent recurrence. Infection control is required to prevent loss of limb/life - maintain dressing coverage at all times, do not soak or get wet in the shower, wash hands before and after dressing changes. Proper nutrition to support skin growth includes increased protein, vitamins and amino acids - animal based foods and Oliver recommended. Moisture management to prevent maceration and dryness - keep wound covered at all times outside dressing care, do not \"air out\" or soak. Dressing will consist of acticoat. Will not place the compression today so that the podiatrist can evaluate him fully. Return Friday to have the compression reapplied and in 1 week with me. Patient instructed on the following:    Should you experience increased redness, swelling, pain, foul odor, size of wound(s), or have a temperature over 101 degrees please contact the [address] [place] [address] Road at [phone] or if after hours contact your primary care physician or go to the hospital emergency department. Orders Placed This Encounter    INITIATE OUTPATIENT WOUND CARE PROTOCOL     Cleanse wound with saline. If wound contains bioburden or contamination, cleanse with wound cleanser or antimicrobial solution. For normal periwound tissue without irritation nor maceration, apply topical skin protectant. For periwound tissue with irritation and/or maceration, apply zinc based product, topical steroid cream/ointment, or equivalent. For wounds with dry firm black eschar and/or without exudate, apply betadine and leave open to air.     For wounds with scant/small to no exudate or drainage, apply wound gel, hydrocolloid, polymer, or equivalent and cover with secondary dressing/foam.    For wounds with moderate/large exudate or drainage, apply alginate, hydrofiber, polymer, or equivalent and cover with secondary dressing/foam.    For wounds with nonviable tissue requiring removal, apply chemical or mechanical debrider and cover with secondary dressing/foam.    For wounds with tunneling, dead space, or cavity, fill or pack with strip/guaze/kerlix to fit and cover with secondary dressing/foam.    For wounds with adequate granulation or epithelization, apply wound gel, hydrocolloid, polymer, collagen, or transparent film, and cover secondary dry dressing/foam.    For wounds that need additional secondary dressing to help pad or control additional drainage/exudates, add foam, absorbent pad or hydrocolloid. For wounds with suspected or know infection, apply antimicrobial mesh and/or antimicrobial alginate/hydrofiber, or antimicrobial solution moistened gauze/kerlix, or equivalent and cover with secondary dressing foam.    Compression management needed for edema control, apply multilayer compression or tubular garment or equivalent. Offloading Management needed for pressure relief, apply offloading shoe/boot or equivalent. Standing Status:   Standing     Number of Occurrences:   1       Follow-up Information    None      Treatment Note please see attached Discharge Instructions    Written patient dismissal instructions given to patient or POA.          Electronically signed by Niles Smallwood DPM on 3/9/2022 at 11:51 AM

## 2022-03-09 NOTE — WOUND CARE
03/09/22 0922   Wound Toe (Comment  which one) Great Toe   Date First Assessed/Time First Assessed: 03/02/22 0904   Present on Hospital Admission: Yes  Primary Wound Type: Blister/bullae  Location: Toe (Comment  which one)  Wound Description: Great Toe   Wound Image    Wound Etiology Other (Comment)  (neuropathic)   Dressing Status Intact   Cleansed Cleansed with saline   Dressing/Treatment   (acticoat)   Wound Length (cm) 0.5 cm   Wound Width (cm) 0.4 cm   Wound Depth (cm) 0.1 cm   Wound Surface Area (cm^2) 0.2 cm^2   Change in Wound Size % 93.33   Wound Volume (cm^3) 0.02 cm^3   Wound Assessment Granulation tissue   Drainage Amount Scant   Drainage Description Serous   Wound Odor None   Ora-Wound/Incision Assessment Intact   Wound Thickness Description Full thickness   Wound Foot Left;Plantar #1   Date First Assessed/Time First Assessed: 01/12/22 1053   Present on Hospital Admission: Yes  Primary Wound Type: Neuropathic  Location: (c) Foot  Wound Location Orientation: Left;Plantar  Wound Description: #1   Wound Image    Wound Etiology Other (Comment)  (neuropathy)   Dressing Status Clean; Intact   Cleansed Cleansed with saline   Dressing/Treatment   (acticoat)   Wound Length (cm) 0.2 cm   Wound Width (cm) 0.2 cm   Wound Depth (cm) 0.1 cm   Wound Surface Area (cm^2) 0.04 cm^2   Change in Wound Size % 86.67   Wound Volume (cm^3) 0.004 cm^3   Wound Healing % 87   Wound Assessment Epithelialization   Drainage Amount None   Drainage Description Serous   Wound Odor None   Ora-Wound/Incision Assessment Intact   Wound Thickness Description Full thickness

## 2022-03-09 NOTE — WOUND CARE
[address]  [address], [address]   Phone: [phone]  Fax: [phone]    Patient: Radha Davenport MRN: [id number]  SSN: [id number]    YOB: 1950  Age: 70 y.o. Sex: male       Return Appointment: 1 week with Ulysses Ashraf DPM    Instructions: Left Foot and Left Great Toe:\  Cleanse wounds with saline. DO NOT GET WET IN SHOWER, USE CAST COVER. Apply Acticoat Flex 3: cut top approximate size of wound, apply to wound bed. Cover with gauze. Then wrap great toe with rolled gauze. Apply 2 layer compression with wound and lower extremity assessment to left leg. If there is any problem with the dressing (too tight, slides down, etc.) Patient to return to wound clinic to have re-wrapped by clinician. Come back on Friday after podiatry appointment for dressing change. Should you experience increased redness, swelling, pain, foul odor, size of wound(s), or have a temperature over 101 degrees please contact the [address] [place] [address] Road at [phone] or if after hours contact your primary care physician or go to the hospital emergency department.     Signed By: Sherlyn Maher RN     March 9, 2022

## 2022-03-11 ENCOUNTER — HOSPITAL ENCOUNTER (OUTPATIENT)
Dept: WOUND CARE | Age: 72
Discharge: HOME | End: 2022-03-11
Attending: PODIATRIST
Payer: MEDICARE

## 2022-03-11 VITALS
SYSTOLIC BLOOD PRESSURE: 169 MMHG | HEIGHT: 74 IN | RESPIRATION RATE: 18 BRPM | BODY MASS INDEX: 21.69 KG/M2 | DIASTOLIC BLOOD PRESSURE: 95 MMHG | TEMPERATURE: 98.3 F | WEIGHT: 169 LBS | HEART RATE: 101 BPM

## 2022-03-11 PROCEDURE — 29581 APPL MULTLAYER CMPRN SYS LEG: CPT

## 2022-03-11 NOTE — WOUND CARE
Multilayer Compression Wrap   (Not Unna) Below the Knee    NAME:  Mando Barron OF BIRTH:  1950  MEDICAL RECORD NUMBER:  [id number]  DATE:  3/11/2022    Removed old Multilayer wrap if indicated and wash leg with mild soap/water. Applied moisturizing agent to dry skin as needed. Applied primary and secondary dressing as ordered. Applied multilayered dressing below the knee to left lower leg. Instructed patient/caregiver not to remove dressing and to keep it clean and dry. Instructed patient/caregiver on complications to report to provider, such as pain, numbness in toes, heavy drainage, and slippage of dressing. Instructed patient on purpose of compression dressing and on activity and exercise recommendations.     Response to treatment: Well tolerated by patient       Electronically signed by Alo Alva RN on 3/11/2022 at 1:27 PM

## 2022-03-11 NOTE — WOUND CARE
03/11/22 1325   Wound Toe (Comment  which one) Great Toe   Date First Assessed/Time First Assessed: 03/02/22 0904   Present on Hospital Admission: Yes  Primary Wound Type: Blister/bullae  Location: Toe (Comment  which one)  Wound Description: Great Toe   Wound Image    Wound Etiology Other (Comment)   Dressing Status Intact   Cleansed Cleansed with saline   Dressing/Treatment   (acticoat)   Wound Length (cm) 0.4 cm   Wound Width (cm) 0.4 cm   Wound Depth (cm) 0.1 cm   Wound Surface Area (cm^2) 0.16 cm^2   Change in Wound Size % 94.67   Wound Volume (cm^3) 0.016 cm^3   Wound Assessment Granulation tissue   Drainage Amount Scant   Drainage Description Serous   Wound Odor None   Ora-Wound/Incision Assessment Intact   Wound Foot Left;Plantar #1   Date First Assessed/Time First Assessed: 01/12/22 1053   Present on Hospital Admission: Yes  Primary Wound Type: Neuropathic  Location: (c) Foot  Wound Location Orientation: Left;Plantar  Wound Description: #1   Wound Image    Wound Etiology Other (Comment)   Dressing Status Clean   Cleansed Cleansed with saline   Dressing/Treatment   (acticoat)   Offloading for Diabetic Foot Ulcers Diabetic shoes/inserts   Wound Length (cm) 0.1 cm   Wound Width (cm) 0.1 cm   Wound Depth (cm) 0.1 cm   Wound Surface Area (cm^2) 0.01 cm^2   Change in Wound Size % 96.67   Wound Volume (cm^3) 0.001 cm^3   Wound Healing % 97   Wound Assessment Pink/red   Drainage Amount None   Wound Odor None

## 2022-03-16 ENCOUNTER — HOSPITAL ENCOUNTER (OUTPATIENT)
Dept: WOUND CARE | Age: 72
Discharge: HOME | End: 2022-03-16
Attending: PODIATRIST
Payer: MEDICARE

## 2022-03-16 VITALS
SYSTOLIC BLOOD PRESSURE: 139 MMHG | DIASTOLIC BLOOD PRESSURE: 81 MMHG | RESPIRATION RATE: 18 BRPM | HEART RATE: 72 BPM | TEMPERATURE: 98.9 F

## 2022-03-16 DIAGNOSIS — L89.893 PRESSURE INJURY OF LEFT FOOT, STAGE 3 (HCC): Primary | ICD-10-CM

## 2022-03-16 PROCEDURE — 29581 APPL MULTLAYER CMPRN SYS LEG: CPT

## 2022-03-16 RX ORDER — LIDOCAINE HYDROCHLORIDE 40 MG/ML
SOLUTION TOPICAL ONCE
Status: CANCELLED | OUTPATIENT
Start: 2022-03-16 | End: 2022-03-16

## 2022-03-16 RX ORDER — MUPIROCIN 20 MG/G
OINTMENT TOPICAL ONCE
Status: CANCELLED | OUTPATIENT
Start: 2022-03-16 | End: 2022-03-16

## 2022-03-16 RX ORDER — LIDOCAINE HYDROCHLORIDE 20 MG/ML
JELLY TOPICAL ONCE
Status: CANCELLED | OUTPATIENT
Start: 2022-03-16 | End: 2022-03-16

## 2022-03-16 RX ORDER — TRIAMCINOLONE ACETONIDE 1 MG/G
OINTMENT TOPICAL ONCE
Status: CANCELLED | OUTPATIENT
Start: 2022-03-16 | End: 2022-03-16

## 2022-03-16 RX ORDER — SILVER SULFADIAZINE 10 G/1000G
CREAM TOPICAL ONCE
Status: CANCELLED | OUTPATIENT
Start: 2022-03-16 | End: 2022-03-16

## 2022-03-16 RX ORDER — BACITRACIN 500 [USP'U]/G
OINTMENT TOPICAL ONCE
Status: CANCELLED | OUTPATIENT
Start: 2022-03-16 | End: 2022-03-16

## 2022-03-16 RX ORDER — BACITRACIN ZINC AND POLYMYXIN B SULFATE 500; 1000 [USP'U]/G; [USP'U]/G
OINTMENT TOPICAL ONCE
Status: CANCELLED | OUTPATIENT
Start: 2022-03-16 | End: 2022-03-16

## 2022-03-16 RX ORDER — CLOBETASOL PROPIONATE 0.5 MG/G
OINTMENT TOPICAL ONCE
Status: CANCELLED | OUTPATIENT
Start: 2022-03-16 | End: 2022-03-16

## 2022-03-16 RX ORDER — GENTAMICIN SULFATE 1 MG/G
OINTMENT TOPICAL ONCE
Status: CANCELLED | OUTPATIENT
Start: 2022-03-16 | End: 2022-03-16

## 2022-03-16 RX ORDER — LIDOCAINE 50 MG/G
OINTMENT TOPICAL ONCE
Status: CANCELLED | OUTPATIENT
Start: 2022-03-16 | End: 2022-03-16

## 2022-03-16 RX ORDER — LIDOCAINE 40 MG/G
CREAM TOPICAL ONCE
Status: CANCELLED | OUTPATIENT
Start: 2022-03-16 | End: 2022-03-16

## 2022-03-16 RX ORDER — BETAMETHASONE DIPROPIONATE 0.5 MG/G
OINTMENT TOPICAL ONCE
Status: CANCELLED | OUTPATIENT
Start: 2022-03-16 | End: 2022-03-16

## 2022-03-16 NOTE — WOUND CARE
[address]  [address], [address]  Phone: [phone]  Fax: [phone]    Patient: Merrick Thompson MRN: [id number]  SSN: [id number]    YOB: 1950  Age: 70 y.o. Sex: male       Return Appointment: 1 week with Tay Lua DPM   Return Appointment: Friday with Clinician      Instructions: Left Foot and Left Great Toe:\  Cleanse wounds with saline. DO NOT GET WET IN SHOWER, USE CAST COVER. Apply Acticoat Flex 3: cut top approximate size of wound, apply to wound bed. Cover with gauze. Then wrap great toe with rolled gauze. Apply 2 layer compression with wound and lower extremity assessment to left leg.  If there is any problem with the dressing (too tight, slides down, etc.) Patient to return to wound clinic to have re-wrapped by clinician. Should you experience increased redness, swelling, pain, foul odor, size of wound(s), or have a temperature over 101 degrees please contact the [address] [place] [address] Road at [phone] or if after hours contact your primary care physician or go to the hospital emergency department.     Signed By: Augusta Manley RN     March 16, 2022

## 2022-03-16 NOTE — WOUND CARE
Multilayer Compression Wrap   (Not Unna) Below the Knee    NAME:  Mega Starkey OF BIRTH:  1950  MEDICAL RECORD NUMBER:  [id number]  DATE:  3/16/2022    Removed old Multilayer wrap if indicated and wash leg with mild soap/water. Applied moisturizing agent to dry skin as needed. Applied primary and secondary dressing as ordered. Applied multilayered dressing below the knee to right lower leg. Applied multilayered dressing below the knee to left lower leg. Instructed patient/caregiver not to remove dressing and to keep it clean and dry. Instructed patient/caregiver on complications to report to provider, such as pain, numbness in toes, heavy drainage, and slippage of dressing. Instructed patient on purpose of compression dressing and on activity and exercise recommendations.     Response to treatment: Well tolerated by patient       Electronically signed by Yu Emery RN on 3/16/2022 at 8:26 AM

## 2022-03-16 NOTE — PROGRESS NOTES
[address], [address] RECORD NUMBER:  [id number]  AGE: 70 y.o. RACE BLACK/  GENDER: male  : 1950  EPISODE DATE:  3/16/2022    Subjective:     Chief Complaint   Patient presents with    Follow-up     Left plantar foot and great toe         Merrick Thompson is a 70 y.o. BLACK/ male who presents with a recurrent wound to the left lower extremity at the sub1st metatarsal head. He saw his podiatrist who modified the insole to increase offloading sub 1st metatarsal head. HISTORY of PRESENT ILLNESS HPI    Nature: Painless  Location: left sub 1st metatarsal head, left distal hallux  Duration:   Onset: Patient states it started as unknown  Course: improving  Aggravating/Alleviating: None reported  Treatment: acticoat, compression    Ulcer Identification:  Ulcer Type: venous, pressure and neuropathic    Contributing Factors: edema, chronic pressure and shear force    Wound: venous          PAST MEDICAL HISTORY    Past Medical History:   Diagnosis Date    Idiopathic chronic venous hypertension of left lower extremity with ulcer and inflammation ([facility] 2022    Peptic ulcer disease     Pressure injury of left foot, stage 3 ([address] 3/9/2022    Ulcer of left heel, with fat layer exposed ([facility] [address] 2022        PAST SURGICAL HISTORY    Past Surgical History:   Procedure Laterality Date    HX HERNIA REPAIR      hiatal hernia repair       FAMILY HISTORY    History reviewed. No pertinent family history. SOCIAL HISTORY    Social History     Tobacco Use    Smoking status: Never Smoker    Smokeless tobacco: Former User   Substance Use Topics    Alcohol use:  Yes    Drug use: Not on file       ALLERGIES    No Known Allergies    MEDICATIONS    Current Outpatient Medications on File Prior to Encounter   Medication Sig Dispense Refill    pantoprazole (Protonix) 40 mg tablet Take 40 mg by mouth two (2) times a day.  famotidine (Pepcid) 20 mg tablet Take 20 mg by mouth two (2) times a day.  chlorproMAZINE (THORAZINE) 10 mg tablet Take 10 mg by mouth three (3) times daily. No current facility-administered medications on file prior to encounter. REVIEW OF SYSTEMS    Pertinent items are noted in HPI. Objective:     Visit Vitals  /81 (BP 1 Location: Left arm, BP Patient Position: At rest)   Pulse 72   Temp 98.9 °F (37.2 °C)   Resp 18       Wt Readings from Last 3 Encounters:   03/11/22 76.7 kg (169 lb)   03/09/22 76.7 kg (169 lb)   03/02/22 74.4 kg (164 lb)       PHYSICAL EXAM    General: well developed, well nourished, pleasant , NAD. Hygiene good  Psych: cooperative. Pleasant. No anxiety or depression. Normal mood and affect. HEENT: Normocephalic, atraumatic. EOMI. Conjunctiva clear. No scleral icterus. Neck: Normal range of motion. No masses. Chest: Good air entry bilaterally. Respirations nonlabored  Cardio: Normal heart sounds,no rubs, murmurs or gallops  Abdomen: Soft, nontender, nondistended, normoactive bowel sounds    Vascular: DP and PT pulses are palpable at 2/4 bilateral. Skin temperature is uniform from proximal to distal bilateral. Hair growth is absent bilateral. No erythema, focal edema, heat is appreciated bilateral. No evidence of cellulitis. Derm: No paronychial infections are noted. Skin is atrophic and xerotic. No subcutaneous masses or hyperpigmented lesions are present. Neuro: Epicritic sensation is absent bilateral. Protective sensation is absent with 5.07 SWMF testing to all 10 sites bilateral. Muscle tone and bulk is symmetric bilateral.  Msk: Muscle strength is 5/5 for all prime movers of the foot bilateral. No effusions are palpable. Full thickness ulceration to the sub 1st metatarsal head left foot. Thick callusing to the rim. Dry granular bed. No odor or purulence. No erythema or edema. No drainage. Near healed. Full thickness ulceration to the distal plantar left hallux. Hyperkeratosis to the rim. Granular bed. No odor or purulence. No erythema or edema. Soft end feel - no bone exposed or palpable.      Wound Foot Left;Plantar #1 (Active)   Wound Image    03/16/22 0816   Wound Etiology Other (Comment) 03/16/22 0816   Dressing Status Clean 03/16/22 0816   Cleansed Cleansed with saline 03/16/22 0816   Dressing/Treatment Band-Aid/Adhesive bandage 01/12/22 1054   Offloading for Diabetic Foot Ulcers Diabetic shoes/inserts 03/16/22 0816   Wound Length (cm) 0.1 cm 03/16/22 0816   Wound Width (cm) 0.1 cm 03/16/22 0816   Wound Depth (cm) 0.1 cm 03/16/22 0816   Wound Surface Area (cm^2) 0.01 cm^2 03/16/22 0816   Change in Wound Size % 96.67 03/16/22 0816   Wound Volume (cm^3) 0.001 cm^3 03/16/22 0816   Wound Healing % 97 03/16/22 0816   Post-Procedure Length (cm) 0.2 cm 03/16/22 0816   Post-Procedure Width (cm) 0.2 cm 03/16/22 0816   Post-Procedure Depth (cm) 0.1 cm 03/16/22 0816   Post-Procedure Surface Area (cm^2) 0.04 cm^2 03/16/22 0816   Post-Procedure Volume (cm^3) 0.004 cm^3 03/16/22 0816   Undermining Starts ___ O'Clock 1 o'clock 03/02/22 0902   Undermining Ends ___ O'Clock 12 o'clock 03/02/22 0902   Undermining Maximum Distance (cm) 0.7 cm 03/02/22 0902   Wound Assessment Pink/red 03/16/22 0816   Drainage Amount None 03/16/22 0816   Drainage Description Serous 03/09/22 0922   Wound Odor None 03/16/22 0816   Ora-Wound/Incision Assessment Hyperkeratosis (Callous) 03/16/22 0816   Wound Thickness Description Full thickness 03/16/22 0816   Number of days: 63       Wound Toe (Comment  which one) Great Toe (Active)   Wound Image    03/16/22 0816   Wound Etiology Other (Comment) 03/16/22 0816   Dressing Status Intact 03/16/22 0816   Cleansed Cleansed with saline 03/16/22 0816   Dressing/Treatment Open to air 03/02/22 0902   Offloading for Diabetic Foot Ulcers Diabetic shoes/inserts 03/16/22 0816   Wound Length (cm) 0.2 cm 03/16/22 0816   Wound Width (cm) 0.2 cm 03/16/22 0816   Wound Depth (cm) 0.1 cm 03/16/22 0816   Wound Surface Area (cm^2) 0.04 cm^2 03/16/22 0816   Change in Wound Size % 98.67 03/16/22 0816   Wound Volume (cm^3) 0.004 cm^3 03/16/22 0816   Post-Procedure Length (cm) 0.4 cm 03/16/22 0816   Post-Procedure Width (cm) 0.4 cm 03/16/22 0816   Post-Procedure Depth (cm) 0.1 cm 03/16/22 0816   Post-Procedure Surface Area (cm^2) 0.16 cm^2 03/16/22 0816   Post-Procedure Volume (cm^3) 0.016 cm^3 03/16/22 0816   Wound Assessment Pink/red 03/16/22 0816   Drainage Amount Scant 03/16/22 0816   Drainage Description Serous 03/16/22 0816   Wound Odor None 03/16/22 0816   Ora-Wound/Incision Assessment Hyperkeratosis (Callous) 03/16/22 0816   Wound Thickness Description Full thickness 03/16/22 0816   Number of days: 14          DATA REVIEW:  No results found for this or any previous visit (from the past 336 hour(s)). LEFT FOOT 3 view(s).     INDICATION: Wound to bottom of left foot and ankle swelling, possible  osteomyelitis first metatarsal head     TECHNIQUE: AP and lateral and oblique views.     COMPARISON: None.     FINDINGS: No periostitis of the first metatarsal. No cortical erosions. There  are small subchondral cysts. Degenerative changes osteophytes. Osteopenia. Hammertoe deformities. Arthritis of the tibiotalar talonavicular joints.     IMPRESSION  Negative for acute osteomyelitis. Degenerative changes. .    Assessment/Plan:      Problem List Items Addressed This Visit        Integumentary    Pressure injury of left foot, stage 3 ([address] - Primary    Relevant Orders    INITIATE OUTPATIENT WOUND CARE PROTOCOL          Debridement Wound Care      Procedure Note  Indications:  Based on my examination of this patient's wound(s)/ulcer(s) today, debridement is required to promote healing and evaluate the wound base.     Performed by: Cammy Alexander DPM    Consent obtained: Yes    Time out taken: Yes    Debridement: Non-excisional/Selective    Using # 15 blade scalpel the wound(s)/ulcer(s) was/were sharply debrided down through and including the removal of    subcutaneous tissue    Devitalized Tissue Debrided: fibrin, biofilm, slough and callus    Pre Debridement Measurements:  Are located in the Wound/Ulcer Documentation Flow Sheet    Diabetic ulcer, fat layer exposed    Wound/Ulcer #: left foot    Post Debridement Measurements:  Wound/Ulcer Descriptions are Pre Debridement except measurements: Total Surface Area Debrided:  0.2 sq cm     Estimated Blood Loss:  None    Hemostasis Achieved: Pressure    Procedural Pain: 0 / 10     Post Procedural Pain: 0 / 10     Response to treatment: Well tolerated by patient     Wounds continue to improve. Use the new insole daily - inside and outside the home. Keep dry. Acticoat to both wounds three times a week. Compression applied. Return in 2 days for dressing change and 1 week with me. Patient instructed on the following:    Should you experience increased redness, swelling, pain, foul odor, size of wound(s), or have a temperature over 101 degrees please contact the [address] [place] [address] Road at [phone] or if after hours contact your primary care physician or go to the hospital emergency department. Orders Placed This Encounter    INITIATE OUTPATIENT WOUND CARE PROTOCOL     Cleanse wound with saline. If wound contains bioburden or contamination, cleanse with wound cleanser or antimicrobial solution. For normal periwound tissue without irritation nor maceration, apply topical skin protectant. For periwound tissue with irritation and/or maceration, apply zinc based product, topical steroid cream/ointment, or equivalent. For wounds with dry firm black eschar and/or without exudate, apply betadine and leave open to air.     For wounds with scant/small to no exudate or drainage, apply wound gel, hydrocolloid, polymer, or equivalent and cover with secondary dressing/foam.    For wounds with moderate/large exudate or drainage, apply alginate, hydrofiber, polymer, or equivalent and cover with secondary dressing/foam.    For wounds with nonviable tissue requiring removal, apply chemical or mechanical debrider and cover with secondary dressing/foam.    For wounds with tunneling, dead space, or cavity, fill or pack with strip/guaze/kerlix to fit and cover with secondary dressing/foam.    For wounds with adequate granulation or epithelization, apply wound gel, hydrocolloid, polymer, collagen, or transparent film, and cover secondary dry dressing/foam.    For wounds that need additional secondary dressing to help pad or control additional drainage/exudates, add foam, absorbent pad or hydrocolloid. For wounds with suspected or know infection, apply antimicrobial mesh and/or antimicrobial alginate/hydrofiber, or antimicrobial solution moistened gauze/kerlix, or equivalent and cover with secondary dressing foam.    Compression management needed for edema control, apply multilayer compression or tubular garment or equivalent. Offloading Management needed for pressure relief, apply offloading shoe/boot or equivalent. Standing Status:   Standing     Number of Occurrences:   1       Follow-up Information    None      Treatment Note please see attached Discharge Instructions    Written patient dismissal instructions given to patient or POA.          Electronically signed by Luc Duran DPM on 3/16/2022 at 11:51 AM

## 2022-03-16 NOTE — WOUND CARE
03/16/22 0816   Wound Toe (Comment  which one) Great Toe   Date First Assessed/Time First Assessed: 03/02/22 0904   Present on Hospital Admission: Yes  Primary Wound Type: Blister/bullae  Location: Toe (Comment  which one)  Wound Description: Great Toe   Wound Image    Wound Etiology Other (Comment)  (neuropathic)   Dressing Status Intact   Cleansed Cleansed with saline   Dressing/Treatment   (acticoat)   Offloading for Diabetic Foot Ulcers Diabetic shoes/inserts   Wound Length (cm) 0.2 cm   Wound Width (cm) 0.2 cm   Wound Depth (cm) 0.1 cm   Wound Surface Area (cm^2) 0.04 cm^2   Change in Wound Size % 98.67   Wound Volume (cm^3) 0.004 cm^3   Wound Assessment Pink/red   Drainage Amount Scant   Drainage Description Serous   Wound Odor None   Ora-Wound/Incision Assessment Hyperkeratosis (Callous)   Wound Thickness Description Full thickness   Wound Foot Left;Plantar #1   Date First Assessed/Time First Assessed: 01/12/22 1053   Present on Hospital Admission: Yes  Primary Wound Type: Neuropathic  Location: (c) Foot  Wound Location Orientation: Left;Plantar  Wound Description: #1   Wound Image    Wound Etiology Other (Comment)  (neuropathic)   Dressing Status Clean   Cleansed Cleansed with saline   Dressing/Treatment   (acticoat)   Offloading for Diabetic Foot Ulcers Diabetic shoes/inserts   Wound Length (cm) 0.1 cm   Wound Width (cm) 0.1 cm   Wound Depth (cm) 0.1 cm   Wound Surface Area (cm^2) 0.01 cm^2   Change in Wound Size % 96.67   Wound Volume (cm^3) 0.001 cm^3   Wound Healing % 97   Wound Assessment Pink/red   Drainage Amount None   Wound Odor None   Ora-Wound/Incision Assessment Hyperkeratosis (Callous)   Wound Thickness Description Full thickness

## 2022-03-18 ENCOUNTER — HOSPITAL ENCOUNTER (OUTPATIENT)
Dept: WOUND CARE | Age: 72
Discharge: HOME | End: 2022-03-18
Attending: PODIATRIST
Payer: MEDICARE

## 2022-03-18 VITALS
WEIGHT: 169 LBS | BODY MASS INDEX: 21.69 KG/M2 | OXYGEN SATURATION: 97 % | HEIGHT: 74 IN | DIASTOLIC BLOOD PRESSURE: 62 MMHG | TEMPERATURE: 98.4 F | SYSTOLIC BLOOD PRESSURE: 159 MMHG | RESPIRATION RATE: 18 BRPM | HEART RATE: 69 BPM

## 2022-03-18 DIAGNOSIS — L89.893 PRESSURE INJURY OF LEFT FOOT, STAGE 3 (HCC): Primary | ICD-10-CM

## 2022-03-18 PROCEDURE — 29581 APPL MULTLAYER CMPRN SYS LEG: CPT

## 2022-03-18 RX ORDER — LIDOCAINE HYDROCHLORIDE 20 MG/ML
JELLY TOPICAL ONCE
Status: CANCELLED | OUTPATIENT
Start: 2022-03-18 | End: 2022-03-18

## 2022-03-18 RX ORDER — BACITRACIN 500 [USP'U]/G
OINTMENT TOPICAL ONCE
Status: CANCELLED | OUTPATIENT
Start: 2022-03-18 | End: 2022-03-18

## 2022-03-18 RX ORDER — LIDOCAINE HYDROCHLORIDE 40 MG/ML
SOLUTION TOPICAL ONCE
Status: CANCELLED | OUTPATIENT
Start: 2022-03-18 | End: 2022-03-18

## 2022-03-18 RX ORDER — LIDOCAINE 40 MG/G
CREAM TOPICAL ONCE
Status: CANCELLED | OUTPATIENT
Start: 2022-03-18 | End: 2022-03-18

## 2022-03-18 RX ORDER — GENTAMICIN SULFATE 1 MG/G
OINTMENT TOPICAL ONCE
Status: CANCELLED | OUTPATIENT
Start: 2022-03-18 | End: 2022-03-18

## 2022-03-18 RX ORDER — BACITRACIN ZINC AND POLYMYXIN B SULFATE 500; 1000 [USP'U]/G; [USP'U]/G
OINTMENT TOPICAL ONCE
Status: CANCELLED | OUTPATIENT
Start: 2022-03-18 | End: 2022-03-18

## 2022-03-18 RX ORDER — TRIAMCINOLONE ACETONIDE 1 MG/G
OINTMENT TOPICAL ONCE
Status: CANCELLED | OUTPATIENT
Start: 2022-03-18 | End: 2022-03-18

## 2022-03-18 RX ORDER — LIDOCAINE 50 MG/G
OINTMENT TOPICAL ONCE
Status: CANCELLED | OUTPATIENT
Start: 2022-03-18 | End: 2022-03-18

## 2022-03-18 RX ORDER — BETAMETHASONE DIPROPIONATE 0.5 MG/G
OINTMENT TOPICAL ONCE
Status: CANCELLED | OUTPATIENT
Start: 2022-03-18 | End: 2022-03-18

## 2022-03-18 RX ORDER — MUPIROCIN 20 MG/G
OINTMENT TOPICAL ONCE
Status: CANCELLED | OUTPATIENT
Start: 2022-03-18 | End: 2022-03-18

## 2022-03-18 RX ORDER — CLOBETASOL PROPIONATE 0.5 MG/G
OINTMENT TOPICAL ONCE
Status: CANCELLED | OUTPATIENT
Start: 2022-03-18 | End: 2022-03-18

## 2022-03-18 RX ORDER — SILVER SULFADIAZINE 10 G/1000G
CREAM TOPICAL ONCE
Status: CANCELLED | OUTPATIENT
Start: 2022-03-18 | End: 2022-03-18

## 2022-03-18 NOTE — WOUND CARE
Multilayer Compression Wrap   (Not Unna) Below the Knee    NAME:  Moises Gomez OF BIRTH:  1950  MEDICAL RECORD NUMBER:  [id number]  DATE:  3/18/2022    Removed old Multilayer wrap if indicated and wash leg with mild soap/water. Applied primary and secondary dressing as ordered. Applied multilayered dressing below the knee to left lower leg. Instructed patient/caregiver not to remove dressing and to keep it clean and dry. Instructed patient/caregiver on complications to report to provider, such as pain, numbness in toes, heavy drainage, and slippage of dressing. Instructed patient on purpose of compression dressing and on activity and exercise recommendations.     Response to treatment: Well tolerated by patient       Electronically signed by Fara Roberts PT, [address] [facility] on 3/18/2022 at 11:51 AM

## 2022-03-18 NOTE — PROGRESS NOTES
03/18/22 0842   Wound Toe (Comment  which one) Great Toe   Date First Assessed/Time First Assessed: 03/02/22 0904   Present on Hospital Admission: Yes  Primary Wound Type: Blister/bullae  Location: Toe (Comment  which one)  Wound Description: Great Toe   Wound Image    Wound Etiology Other (Comment)  (neuropathic)   Dressing Status Intact   Cleansed Cleansed with saline   Dressing/Treatment   (Acticoat, gauze, rolled gauze)   Offloading for Diabetic Foot Ulcers Diabetic shoes/inserts   Wound Length (cm) 0.1 cm   Wound Width (cm) 0.1 cm   Wound Depth (cm) 0.1 cm   Wound Surface Area (cm^2) 0.01 cm^2   Change in Wound Size % 99.67   Wound Volume (cm^3) 0.001 cm^3   Wound Assessment Pink/red   Drainage Amount Scant   Drainage Description Serous   Wound Odor None   Ora-Wound/Incision Assessment Hyperkeratosis (Callous)   Wound Foot Left;Plantar #1   Date First Assessed/Time First Assessed: 01/12/22 1053   Present on Hospital Admission: Yes  Primary Wound Type: Neuropathic  Location: (c) Foot  Wound Location Orientation: Left;Plantar  Wound Description: #1   Wound Image    Wound Etiology Other (Comment)  (neuropathic)   Dressing Status Clean   Cleansed Cleansed with saline   Dressing/Treatment   (Acticoat, gauze, Mepitel One)   Offloading for Diabetic Foot Ulcers Diabetic shoes/inserts   Wound Length (cm) 0.1 cm   Wound Width (cm) 0.1 cm   Wound Depth (cm) 0.1 cm   Wound Surface Area (cm^2) 0.01 cm^2   Change in Wound Size % 96.67   Wound Volume (cm^3) 0.001 cm^3   Wound Healing % 97   Wound Assessment Pink/red   Drainage Amount None   Wound Odor None   Ora-Wound/Incision Assessment Hyperkeratosis (Callous)   Wound Thickness Description Full thickness

## 2022-03-19 PROBLEM — I87.332 IDIOPATHIC CHRONIC VENOUS HYPERTENSION OF LEFT LOWER EXTREMITY WITH ULCER AND INFLAMMATION (HCC): Status: ACTIVE | Noted: 2022-01-12

## 2022-03-19 PROBLEM — L97.929 IDIOPATHIC CHRONIC VENOUS HYPERTENSION OF LEFT LOWER EXTREMITY WITH ULCER AND INFLAMMATION (HCC): Status: ACTIVE | Noted: 2022-01-12

## 2022-03-19 PROBLEM — L97.422 ULCER OF LEFT HEEL, WITH FAT LAYER EXPOSED (HCC): Status: ACTIVE | Noted: 2022-01-12

## 2022-03-23 ENCOUNTER — HOSPITAL ENCOUNTER (OUTPATIENT)
Dept: WOUND CARE | Age: 72
Discharge: HOME | End: 2022-03-23
Attending: PODIATRIST
Payer: MEDICARE

## 2022-03-23 VITALS
BODY MASS INDEX: 21.17 KG/M2 | OXYGEN SATURATION: 99 % | SYSTOLIC BLOOD PRESSURE: 134 MMHG | DIASTOLIC BLOOD PRESSURE: 73 MMHG | WEIGHT: 165 LBS | HEART RATE: 68 BPM | HEIGHT: 74 IN | TEMPERATURE: 98.9 F | RESPIRATION RATE: 18 BRPM

## 2022-03-23 DIAGNOSIS — L89.893 PRESSURE INJURY OF LEFT FOOT, STAGE 3 (HCC): Primary | ICD-10-CM

## 2022-03-23 PROCEDURE — 99213 OFFICE O/P EST LOW 20 MIN: CPT

## 2022-03-23 RX ORDER — BETAMETHASONE DIPROPIONATE 0.5 MG/G
OINTMENT TOPICAL ONCE
Status: CANCELLED | OUTPATIENT
Start: 2022-03-23 | End: 2022-03-23

## 2022-03-23 RX ORDER — CLOBETASOL PROPIONATE 0.5 MG/G
OINTMENT TOPICAL ONCE
Status: CANCELLED | OUTPATIENT
Start: 2022-03-23 | End: 2022-03-23

## 2022-03-23 RX ORDER — LIDOCAINE HYDROCHLORIDE 40 MG/ML
SOLUTION TOPICAL ONCE
Status: CANCELLED | OUTPATIENT
Start: 2022-03-23 | End: 2022-03-23

## 2022-03-23 RX ORDER — BACITRACIN ZINC AND POLYMYXIN B SULFATE 500; 1000 [USP'U]/G; [USP'U]/G
OINTMENT TOPICAL ONCE
Status: CANCELLED | OUTPATIENT
Start: 2022-03-23 | End: 2022-03-23

## 2022-03-23 RX ORDER — LIDOCAINE HYDROCHLORIDE 20 MG/ML
JELLY TOPICAL ONCE
Status: CANCELLED | OUTPATIENT
Start: 2022-03-23 | End: 2022-03-23

## 2022-03-23 RX ORDER — BACITRACIN 500 [USP'U]/G
OINTMENT TOPICAL ONCE
Status: CANCELLED | OUTPATIENT
Start: 2022-03-23 | End: 2022-03-23

## 2022-03-23 RX ORDER — MUPIROCIN 20 MG/G
OINTMENT TOPICAL ONCE
Status: CANCELLED | OUTPATIENT
Start: 2022-03-23 | End: 2022-03-23

## 2022-03-23 RX ORDER — GENTAMICIN SULFATE 1 MG/G
OINTMENT TOPICAL ONCE
Status: CANCELLED | OUTPATIENT
Start: 2022-03-23 | End: 2022-03-23

## 2022-03-23 RX ORDER — TRIAMCINOLONE ACETONIDE 1 MG/G
OINTMENT TOPICAL ONCE
Status: CANCELLED | OUTPATIENT
Start: 2022-03-23 | End: 2022-03-23

## 2022-03-23 RX ORDER — LIDOCAINE 50 MG/G
OINTMENT TOPICAL ONCE
Status: CANCELLED | OUTPATIENT
Start: 2022-03-23 | End: 2022-03-23

## 2022-03-23 RX ORDER — SILVER SULFADIAZINE 10 G/1000G
CREAM TOPICAL ONCE
Status: CANCELLED | OUTPATIENT
Start: 2022-03-23 | End: 2022-03-23

## 2022-03-23 RX ORDER — LIDOCAINE 40 MG/G
CREAM TOPICAL ONCE
Status: CANCELLED | OUTPATIENT
Start: 2022-03-23 | End: 2022-03-23

## 2022-03-23 NOTE — DISCHARGE INSTRUCTIONS
[address]  [address], [address]  Phone: [phone]  Fax: [phone]    Patient: Chris Britton MRN: [id number]  SSN: [id number]    YOB: 1950  Age: 70 y.o. Sex: male       Return Appointment: 1 week with Christiano Chau DPM    Instructions: Left Great Toe:  Left plantar foot wound healed! Patient may shower without dressing. Iodosorb to great toe- apply thin layer to wound bed, cover with cover dressing, change daily  Cover with adhesive bandage. Change dressing daily.     Wear compression stockings to both legs daily. Apply first in morning and remove at night for bathing and sleeping. Should you experience increased redness, swelling, pain, foul odor, size of wound(s), or have a temperature over 101 degrees please contact the [address] [place] [address] Road at [phone] or if after hours contact your primary care physician or go to the hospital emergency department.     Signed By: Sharyl Dancer, PT, [facility]     March 23, 2022

## 2022-03-23 NOTE — WOUND CARE
[address]  [address], [address]  Phone: [phone]  Fax: [phone]    Patient: Merary Trinh MRN: [id number]  SSN: [id number]    YOB: 1950  Age: 70 y.o. Sex: male       Return Appointment: 1 week with Tammy Whatley DPM    Instructions: Left Great Toe:  Left plantar foot wound healed! Patient may shower without dressing. Iodosorb to great toe- apply thin layer to wound bed, cover with cover dressing, change daily  Cover with adhesive bandage. Change dressing daily. Wear compression stockings to both legs daily. Apply first in morning and remove at night for bathing and sleeping. Should you experience increased redness, swelling, pain, foul odor, size of wound(s), or have a temperature over 101 degrees please contact the [address] [place] [address] Road at [phone] or if after hours contact your primary care physician or go to the hospital emergency department.     Signed By: Eddie Greene PT, [facility]     March 23, 2022

## 2022-03-23 NOTE — PROGRESS NOTES
[address], [address] RECORD NUMBER:  [id number]  AGE: 70 y.o. RACE BLACK/  GENDER: male  : 1950  EPISODE DATE:  3/23/2022    Subjective:     Chief Complaint   Patient presents with    Wound Check     Left great toe and left plantar foot wounds        Chris Britton is a 70 y.o. BLACK/ male who presents with a recurrent wound to the left lower extremity at the sub1st metatarsal head. He brings in compression stockings today. HISTORY of PRESENT ILLNESS HPI    Nature: Painless  Location: left sub 1st metatarsal head, left distal hallux  Duration:   Onset: Patient states it started as unknown  Course: improving  Aggravating/Alleviating: None reported  Treatment: acticoat, compression    Ulcer Identification:  Ulcer Type: venous, pressure and neuropathic    Contributing Factors: edema, chronic pressure and shear force    Wound: venous          PAST MEDICAL HISTORY    Past Medical History:   Diagnosis Date    Idiopathic chronic venous hypertension of left lower extremity with ulcer and inflammation ([address] 2022    Peptic ulcer disease     Pressure injury of left foot, stage 3 ([address] 3/9/2022    Ulcer of left heel, with fat layer exposed ([address] 2022        PAST SURGICAL HISTORY    Past Surgical History:   Procedure Laterality Date    HX HERNIA REPAIR      hiatal hernia repair       FAMILY HISTORY    History reviewed. No pertinent family history. SOCIAL HISTORY    Social History     Tobacco Use    Smoking status: Never Smoker    Smokeless tobacco: Former User   Substance Use Topics    Alcohol use: Yes    Drug use: Not on file       ALLERGIES    No Known Allergies    MEDICATIONS    No current outpatient medications on file prior to encounter. No current facility-administered medications on file prior to encounter.        REVIEW OF SYSTEMS    Pertinent items are noted in HPI. Objective:     Visit Vitals  /73 (BP 1 Location: Left upper arm, BP Patient Position: At rest)   Pulse 68   Temp 98.9 °F (37.2 °C)   Resp 18   Ht 6' 2\" (1.88 m)   Wt 74.8 kg (165 lb)   SpO2 99%   BMI 21.18 kg/m²       Wt Readings from Last 3 Encounters:   03/23/22 74.8 kg (165 lb)   03/18/22 76.7 kg (169 lb)   03/11/22 76.7 kg (169 lb)       PHYSICAL EXAM    General: well developed, well nourished, pleasant , NAD. Hygiene good  Psych: cooperative. Pleasant. No anxiety or depression. Normal mood and affect. HEENT: Normocephalic, atraumatic. EOMI. Conjunctiva clear. No scleral icterus. Neck: Normal range of motion. No masses. Chest: Good air entry bilaterally. Respirations nonlabored  Cardio: Normal heart sounds,no rubs, murmurs or gallops  Abdomen: Soft, nontender, nondistended, normoactive bowel sounds    Vascular: DP and PT pulses are palpable at 2/4 bilateral. Skin temperature is uniform from proximal to distal bilateral. Hair growth is absent bilateral. No erythema, focal edema, heat is appreciated bilateral. No evidence of cellulitis. Derm: No paronychial infections are noted. Skin is atrophic and xerotic. No subcutaneous masses or hyperpigmented lesions are present. Neuro: Epicritic sensation is absent bilateral. Protective sensation is absent with 5.07 SWMF testing to all 10 sites bilateral. Muscle tone and bulk is symmetric bilateral.  Msk: Muscle strength is 5/5 for all prime movers of the foot bilateral. No effusions are palpable. Prior full thickness ulceration to the sub 1st metatarsal head left foot with epithelium to the base and normal rim with no new callus build up. Full thickness ulceration to the distal plantar left hallux. Normal rim. Granular bed. No odor or purulence. No erythema or edema. Soft end feel - no bone exposed or palpable. Near healed.      Wound Toe (Comment  which one) Left Great Toe (Active)   Wound Image 03/23/22 0913   Wound Etiology Other (Comment) 03/23/22 0913   Dressing Status Old drainage noted 03/23/22 0913   Cleansed Cleansed with saline 03/23/22 0913   Dressing/Treatment Open to air 03/02/22 0902   Offloading for Diabetic Foot Ulcers Diabetic shoes/inserts 03/23/22 0913   Wound Length (cm) 0.2 cm 03/23/22 0913   Wound Width (cm) 0.1 cm 03/23/22 0913   Wound Depth (cm) 0.1 cm 03/23/22 0913   Wound Surface Area (cm^2) 0.02 cm^2 03/23/22 0913   Change in Wound Size % 99.33 03/23/22 0913   Wound Volume (cm^3) 0.002 cm^3 03/23/22 0913   Post-Procedure Length (cm) 0.4 cm 03/16/22 0816   Post-Procedure Width (cm) 0.4 cm 03/16/22 0816   Post-Procedure Depth (cm) 0.1 cm 03/16/22 0816   Post-Procedure Surface Area (cm^2) 0.16 cm^2 03/16/22 0816   Post-Procedure Volume (cm^3) 0.016 cm^3 03/16/22 0816   Wound Assessment Pink/red 03/23/22 0913   Drainage Amount Scant 03/23/22 0913   Drainage Description Serous 03/23/22 0913   Wound Odor None 03/23/22 0913   Ora-Wound/Incision Assessment Hyperkeratosis (Callous) 03/23/22 0913   Wound Thickness Description Full thickness 03/23/22 0913   Number of days: 21      DATA REVIEW:  No results found for this or any previous visit (from the past 336 hour(s)). LEFT FOOT 3 view(s).     INDICATION: Wound to bottom of left foot and ankle swelling, possible  osteomyelitis first metatarsal head     TECHNIQUE: AP and lateral and oblique views.     COMPARISON: None.     FINDINGS: No periostitis of the first metatarsal. No cortical erosions. There  are small subchondral cysts. Degenerative changes osteophytes. Osteopenia. Hammertoe deformities. Arthritis of the tibiotalar talonavicular joints.     IMPRESSION  Negative for acute osteomyelitis. Degenerative changes. .    Assessment/Plan:      Problem List Items Addressed This Visit        Integumentary    Pressure injury of left foot, stage 3 ([address] - Primary    Relevant Orders    INITIATE OUTPATIENT WOUND CARE PROTOCOL        Sub 1st wound is now healed - discontinue dressing care to this area. Distal toe wound remains but is near healed. He may move out of compression therapy and into stockings. Wear from morning until night. He does not need to sleep in them. He may now shower - wash and rinse the foot as the last act then immediately leave the shower. Dry well, apply iodosorb and cover with bandage. Return in 1 week. Patient instructed on the following:    Should you experience increased redness, swelling, pain, foul odor, size of wound(s), or have a temperature over 101 degrees please contact the [address] [place] [address] Road at [phone] or if after hours contact your primary care physician or go to the hospital emergency department. Orders Placed This Encounter    INITIATE OUTPATIENT WOUND CARE PROTOCOL     Cleanse wound with saline. If wound contains bioburden or contamination, cleanse with wound cleanser or antimicrobial solution. For normal periwound tissue without irritation nor maceration, apply topical skin protectant. For periwound tissue with irritation and/or maceration, apply zinc based product, topical steroid cream/ointment, or equivalent. For wounds with dry firm black eschar and/or without exudate, apply betadine and leave open to air.     For wounds with scant/small to no exudate or drainage, apply wound gel, hydrocolloid, polymer, or equivalent and cover with secondary dressing/foam.    For wounds with moderate/large exudate or drainage, apply alginate, hydrofiber, polymer, or equivalent and cover with secondary dressing/foam.    For wounds with nonviable tissue requiring removal, apply chemical or mechanical debrider and cover with secondary dressing/foam.    For wounds with tunneling, dead space, or cavity, fill or pack with strip/guaze/kerlix to fit and cover with secondary dressing/foam.    For wounds with adequate granulation or epithelization, apply wound gel, hydrocolloid, polymer, collagen, or transparent film, and cover secondary dry dressing/foam.    For wounds that need additional secondary dressing to help pad or control additional drainage/exudates, add foam, absorbent pad or hydrocolloid. For wounds with suspected or know infection, apply antimicrobial mesh and/or antimicrobial alginate/hydrofiber, or antimicrobial solution moistened gauze/kerlix, or equivalent and cover with secondary dressing foam.    Compression management needed for edema control, apply multilayer compression or tubular garment or equivalent. Offloading Management needed for pressure relief, apply offloading shoe/boot or equivalent. Standing Status:   Standing     Number of Occurrences:   1       Follow-up Information     Follow up With Specialties Details Why Contact Info    [address] In 1 week For wound re-check [address]       Treatment Note please see attached Discharge Instructions    Written patient dismissal instructions given to patient or POA.          Electronically signed by Kanchan Soto DPM on 3/23/2022 at 11:51 AM

## 2022-03-23 NOTE — PROGRESS NOTES
03/23/22 0913   Wound Toe (Comment  which one) Left Great Toe   Date First Assessed/Time First Assessed: 03/02/22 0904   Present on Hospital Admission: Yes  Primary Wound Type: Blister/bullae  Location: Toe (Comment  which one)  Wound Location Orientation: Left  Wound Description: Great Toe   Wound Image    Wound Etiology Other (Comment)  (neuropathic)   Dressing Status Old drainage noted   Cleansed Cleansed with saline   Dressing/Treatment   (Acticoat, ABD, rolled gauze)   Offloading for Diabetic Foot Ulcers Diabetic shoes/inserts   Wound Length (cm) 0.2 cm   Wound Width (cm) 0.1 cm   Wound Depth (cm) 0.1 cm   Wound Surface Area (cm^2) 0.02 cm^2   Change in Wound Size % 99.33   Wound Volume (cm^3) 0.002 cm^3   Wound Assessment Pink/red   Drainage Amount Scant   Drainage Description Serous   Wound Odor None   Ora-Wound/Incision Assessment Hyperkeratosis (Callous)   Wound Thickness Description Full thickness   [REMOVED] Wound Foot Left;Plantar #1   Final Assessment Date: 03/23/22  Date First Assessed/Time First Assessed: 01/12/22 1053   Present on Hospital Admission: Yes  Primary Wound Type: Neuropathic  Location: (c) Foot  Wound Location Orientation: Left;Plantar  Wound Description: #1  Wound O... Wound Image    Wound Etiology Other (Comment)   Dressing Status Clean;Dry; Intact   Cleansed Cleansed with saline   Dressing/Treatment   (Acticoat, ABD, Mepitel One, Coflex TLC)   Offloading for Diabetic Foot Ulcers Diabetic shoes/inserts   Wound Length (cm) 0 cm   Wound Width (cm) 0 cm   Wound Depth (cm) 0 cm   Wound Surface Area (cm^2) 0 cm^2   Change in Wound Size % 100   Wound Volume (cm^3) 0 cm^3   Wound Healing % 100   Wound Assessment Epithelialization   Drainage Amount None   Wound Odor None   Ora-Wound/Incision Assessment Intact

## 2022-03-30 ENCOUNTER — HOSPITAL ENCOUNTER (OUTPATIENT)
Dept: WOUND CARE | Age: 72
Discharge: HOME | End: 2022-03-30
Attending: PODIATRIST
Payer: MEDICARE

## 2022-03-30 VITALS
WEIGHT: 167 LBS | HEIGHT: 74 IN | BODY MASS INDEX: 21.43 KG/M2 | HEART RATE: 64 BPM | SYSTOLIC BLOOD PRESSURE: 139 MMHG | DIASTOLIC BLOOD PRESSURE: 99 MMHG

## 2022-03-30 DIAGNOSIS — L89.893 PRESSURE INJURY OF LEFT FOOT, STAGE 3 (HCC): Primary | ICD-10-CM

## 2022-03-30 PROCEDURE — 99212 OFFICE O/P EST SF 10 MIN: CPT

## 2022-03-30 RX ORDER — LIDOCAINE HYDROCHLORIDE 20 MG/ML
JELLY TOPICAL ONCE
Status: CANCELLED | OUTPATIENT
Start: 2022-03-30 | End: 2022-03-30

## 2022-03-30 RX ORDER — LIDOCAINE 40 MG/G
CREAM TOPICAL ONCE
Status: CANCELLED | OUTPATIENT
Start: 2022-03-30 | End: 2022-03-30

## 2022-03-30 RX ORDER — BETAMETHASONE DIPROPIONATE 0.5 MG/G
OINTMENT TOPICAL ONCE
Status: CANCELLED | OUTPATIENT
Start: 2022-03-30 | End: 2022-03-30

## 2022-03-30 RX ORDER — GENTAMICIN SULFATE 1 MG/G
OINTMENT TOPICAL ONCE
Status: CANCELLED | OUTPATIENT
Start: 2022-03-30 | End: 2022-03-30

## 2022-03-30 RX ORDER — CLOBETASOL PROPIONATE 0.5 MG/G
OINTMENT TOPICAL ONCE
Status: CANCELLED | OUTPATIENT
Start: 2022-03-30 | End: 2022-03-30

## 2022-03-30 RX ORDER — BACITRACIN 500 [USP'U]/G
OINTMENT TOPICAL ONCE
Status: CANCELLED | OUTPATIENT
Start: 2022-03-30 | End: 2022-03-30

## 2022-03-30 RX ORDER — TRIAMCINOLONE ACETONIDE 1 MG/G
OINTMENT TOPICAL ONCE
Status: CANCELLED | OUTPATIENT
Start: 2022-03-30 | End: 2022-03-30

## 2022-03-30 RX ORDER — LIDOCAINE 50 MG/G
OINTMENT TOPICAL ONCE
Status: CANCELLED | OUTPATIENT
Start: 2022-03-30 | End: 2022-03-30

## 2022-03-30 RX ORDER — MUPIROCIN 20 MG/G
OINTMENT TOPICAL ONCE
Status: CANCELLED | OUTPATIENT
Start: 2022-03-30 | End: 2022-03-30

## 2022-03-30 RX ORDER — BACITRACIN ZINC AND POLYMYXIN B SULFATE 500; 1000 [USP'U]/G; [USP'U]/G
OINTMENT TOPICAL ONCE
Status: CANCELLED | OUTPATIENT
Start: 2022-03-30 | End: 2022-03-30

## 2022-03-30 RX ORDER — LIDOCAINE HYDROCHLORIDE 40 MG/ML
SOLUTION TOPICAL ONCE
Status: CANCELLED | OUTPATIENT
Start: 2022-03-30 | End: 2022-03-30

## 2022-03-30 RX ORDER — SILVER SULFADIAZINE 10 G/1000G
CREAM TOPICAL ONCE
Status: CANCELLED | OUTPATIENT
Start: 2022-03-30 | End: 2022-03-30

## 2022-03-30 NOTE — WOUND CARE
[address]  [address], [address]  Phone: [phone]  Fax: [phone]    Patient: Joel Sanchez MRN: [id number]  SSN: [id number]    YOB: 1950  Age: 70 y.o. Sex: male       Return Appointment: discharge from wound center with James Cameron DPM    Instructions: Left great toe:  Continue iodosorb and cover dressing  Change daily  Follow up with podiatrist and discharge from wound center    Should you experience increased redness, swelling, pain, foul odor, size of wound(s), or have a temperature over 101 degrees please contact the [address] at [phone] or if after hours contact your primary care physician or go to the hospital emergency department.     Signed By: Jimmy Hoffman RN     March 30, 2022

## 2022-03-30 NOTE — PROGRESS NOTES
[address], [address] RECORD NUMBER:  [id number]  AGE: 70 y.o. RACE BLACK/  GENDER: male  : 1950  EPISODE DATE:  3/30/2022    Subjective:     Chief Complaint   Patient presents with    Wound Check     left great toe 3/30/22        Myriam Subramanian is a 70 y.o. BLACK/ male who presents with a recurrent wound to the left lower extremity at the sub1st metatarsal head and to the distal tip left hallux. He notes no drainage. He is wearing the compression stockings daily. He returns to his podiatrist next week. HISTORY of PRESENT ILLNESS HPI    Nature: Painless  Location: left sub 1st metatarsal head, left distal hallux  Duration:   Onset: Patient states it started as unknown  Course: improving  Aggravating/Alleviating: None reported  Treatment: iodosorb, compression    Ulcer Identification:  Ulcer Type: venous, pressure and neuropathic    Contributing Factors: edema, chronic pressure and shear force    Wound: venous          PAST MEDICAL HISTORY    Past Medical History:   Diagnosis Date    Idiopathic chronic venous hypertension of left lower extremity with ulcer and inflammation ([address] 2022    Peptic ulcer disease     Pressure injury of left foot, stage 3 ([address] 3/9/2022    Ulcer of left heel, with fat layer exposed ([address] 2022        PAST SURGICAL HISTORY    Past Surgical History:   Procedure Laterality Date    HX HERNIA REPAIR      hiatal hernia repair       FAMILY HISTORY    History reviewed. No pertinent family history. SOCIAL HISTORY    Social History     Tobacco Use    Smoking status: Never Smoker    Smokeless tobacco: Former User   Substance Use Topics    Alcohol use: Yes    Drug use: Not on file       ALLERGIES    No Known Allergies    MEDICATIONS    No current outpatient medications on file prior to encounter.      No current facility-administered medications on file prior to encounter. REVIEW OF SYSTEMS    Pertinent items are noted in HPI. Objective:     Visit Vitals  BP (!) 139/99 (BP 1 Location: Left upper arm, BP Patient Position: At rest)   Pulse 64   Ht 6' 2\" (1.88 m)   Wt 75.8 kg (167 lb)   BMI 21.44 kg/m²       Wt Readings from Last 3 Encounters:   03/30/22 75.8 kg (167 lb)   03/23/22 74.8 kg (165 lb)   03/18/22 76.7 kg (169 lb)       PHYSICAL EXAM    General: well developed, well nourished, pleasant , NAD. Hygiene good  Psych: cooperative. Pleasant. No anxiety or depression. Normal mood and affect. HEENT: Normocephalic, atraumatic. EOMI. Conjunctiva clear. No scleral icterus. Neck: Normal range of motion. No masses. Chest: Good air entry bilaterally. Respirations nonlabored  Cardio: Normal heart sounds,no rubs, murmurs or gallops  Abdomen: Soft, nontender, nondistended, normoactive bowel sounds    Vascular: DP and PT pulses are palpable at 2/4 bilateral. Skin temperature is uniform from proximal to distal bilateral. Hair growth is absent bilateral. No erythema, focal edema, heat is appreciated bilateral. No evidence of cellulitis. Derm: No paronychial infections are noted. Skin is atrophic and xerotic. No subcutaneous masses or hyperpigmented lesions are present. Neuro: Epicritic sensation is absent bilateral. Protective sensation is absent with 5.07 SWMF testing to all 10 sites bilateral. Muscle tone and bulk is symmetric bilateral.  Msk: Muscle strength is 5/5 for all prime movers of the foot bilateral. No effusions are palpable.       Prior full thickness ulceration to the sub 1st metatarsal head and distal tip hallux left foot with epithelium to the base and normal rim       Wound Toe (Comment  which one) Left Great Toe (Active)   Wound Image   03/30/22 0828   Wound Etiology Other (Comment) 03/30/22 0828   Dressing Status Old drainage noted 03/30/22 0828   Cleansed Cleansed with saline 03/30/22 0828 Dressing/Treatment Open to air 03/02/22 0902   Offloading for Diabetic Foot Ulcers Diabetic shoes/inserts 03/30/22 0828   Wound Length (cm) 0 cm 03/30/22 0828   Wound Width (cm) 0 cm 03/30/22 0828   Wound Depth (cm) 0 cm 03/30/22 0828   Wound Surface Area (cm^2) 0 cm^2 03/30/22 0828   Change in Wound Size % 100 03/30/22 0828   Wound Volume (cm^3) 0 cm^3 03/30/22 0828   Post-Procedure Length (cm) 0.4 cm 03/16/22 0816   Post-Procedure Width (cm) 0.4 cm 03/16/22 0816   Post-Procedure Depth (cm) 0.1 cm 03/16/22 0816   Post-Procedure Surface Area (cm^2) 0.16 cm^2 03/16/22 0816   Post-Procedure Volume (cm^3) 0.016 cm^3 03/16/22 0816   Wound Assessment Pink/red 03/23/22 0913   Drainage Amount Scant 03/30/22 0828   Drainage Description Serous 03/30/22 0828   Wound Odor None 03/30/22 0828   Ora-Wound/Incision Assessment Hyperkeratosis (Callous) 03/30/22 0828   Wound Thickness Description Full thickness 03/23/22 0913   Number of days: 28          DATA REVIEW:  No results found for this or any previous visit (from the past 336 hour(s)). LEFT FOOT 3 view(s).     INDICATION: Wound to bottom of left foot and ankle swelling, possible  osteomyelitis first metatarsal head     TECHNIQUE: AP and lateral and oblique views.     COMPARISON: None.     FINDINGS: No periostitis of the first metatarsal. No cortical erosions. There  are small subchondral cysts. Degenerative changes osteophytes. Osteopenia. Hammertoe deformities. Arthritis of the tibiotalar talonavicular joints.     IMPRESSION  Negative for acute osteomyelitis. Degenerative changes. .    Assessment/Plan:      Problem List Items Addressed This Visit        Integumentary    Pressure injury of left foot, stage 3 ([address] - Primary    Relevant Orders    INITIATE OUTPATIENT WOUND CARE PROTOCOL        Wounds are now healed. He is to keep his follow up with his podiatrist and establish routine care there to prevent recurrence. Remain in modified insoles at all times.  Return here as needed. Patient instructed on the following:    Should you experience increased redness, swelling, pain, foul odor, size of wound(s), or have a temperature over 101 degrees please contact the [address] [place] [address] Road at [phone] or if after hours contact your primary care physician or go to the hospital emergency department. Orders Placed This Encounter    WOUND CARE, DRESSING CHANGE     Left great toe:  Continue iodosorb and cover dressing  Change daily  Follow up with podiatrist and discharge from wound center     Standing Status:   Standing     Number of Occurrences:   1    INITIATE OUTPATIENT WOUND CARE PROTOCOL     Cleanse wound with saline. If wound contains bioburden or contamination, cleanse with wound cleanser or antimicrobial solution. For normal periwound tissue without irritation nor maceration, apply topical skin protectant. For periwound tissue with irritation and/or maceration, apply zinc based product, topical steroid cream/ointment, or equivalent. For wounds with dry firm black eschar and/or without exudate, apply betadine and leave open to air.     For wounds with scant/small to no exudate or drainage, apply wound gel, hydrocolloid, polymer, or equivalent and cover with secondary dressing/foam.    For wounds with moderate/large exudate or drainage, apply alginate, hydrofiber, polymer, or equivalent and cover with secondary dressing/foam.    For wounds with nonviable tissue requiring removal, apply chemical or mechanical debrider and cover with secondary dressing/foam.    For wounds with tunneling, dead space, or cavity, fill or pack with strip/guaze/kerlix to fit and cover with secondary dressing/foam.    For wounds with adequate granulation or epithelization, apply wound gel, hydrocolloid, polymer, collagen, or transparent film, and cover secondary dry dressing/foam.    For wounds that need additional secondary dressing to help pad or control additional drainage/exudates, add foam, absorbent pad or hydrocolloid. For wounds with suspected or know infection, apply antimicrobial mesh and/or antimicrobial alginate/hydrofiber, or antimicrobial solution moistened gauze/kerlix, or equivalent and cover with secondary dressing foam.    Compression management needed for edema control, apply multilayer compression or tubular garment or equivalent. Offloading Management needed for pressure relief, apply offloading shoe/boot or equivalent. Standing Status:   Standing     Number of Occurrences:   1       Follow-up Information     Follow up With Specialties Details Why Contact Info    [address]  discharge from wound [facility] [address] [facility] [id number] [phone]       Treatment Note please see attached Discharge Instructions    Written patient dismissal instructions given to patient or POA.          Electronically signed by Jefferson Higuera DPM on 3/30/2022 at 11:51 AM

## 2022-03-30 NOTE — DISCHARGE INSTRUCTIONS
[address]  [address], [address]  Phone: [phone]  Fax: [phone]    Patient: Debi Cockayne MRN: [id number]  SSN: [id number]    YOB: 1950  Age: 70 y.o. Sex: male       Return Appointment: discharge from wound center with Michael Parra DPM    Instructions: Left great toe:  Continue iodosorb and cover dressing  Change daily  Follow up with podiatrist and discharge from wound center    Should you experience increased redness, swelling, pain, foul odor, size of wound(s), or have a temperature over 101 degrees please contact the [address] [place] [address] Road at [phone] or if after hours contact your primary care physician or go to the hospital emergency department.     Signed By: Hui Tirado RN     March 30, 2022

## 2022-03-30 NOTE — WOUND CARE
03/30/22 0828   Wound Toe (Comment  which one) Left Great Toe   Date First Assessed/Time First Assessed: 03/02/22 0904   Present on Hospital Admission: Yes  Primary Wound Type: Blister/bullae  Location: Toe (Comment  which one)  Wound Location Orientation: Left  Wound Description: Great Toe   Wound Image    Wound Etiology Other (Comment)   Dressing Status Old drainage noted   Cleansed Cleansed with saline   Dressing/Treatment   (iodosorb and cover dressing)   Offloading for Diabetic Foot Ulcers Diabetic shoes/inserts   Wound Length (cm) 0 cm   Wound Width (cm) 0 cm   Wound Depth (cm) 0 cm   Wound Surface Area (cm^2) 0 cm^2   Change in Wound Size % 100   Wound Volume (cm^3) 0 cm^3   Wound Assessment   (callous)   Drainage Amount Scant   Drainage Description Serous   Wound Odor None   Ora-Wound/Incision Assessment Hyperkeratosis (Callous)

## 2022-04-13 ENCOUNTER — HOSPITAL ENCOUNTER (OUTPATIENT)
Dept: WOUND CARE | Age: 72
Discharge: HOME | End: 2022-04-13
Attending: PODIATRIST
Payer: MEDICARE

## 2022-04-13 VITALS
RESPIRATION RATE: 18 BRPM | DIASTOLIC BLOOD PRESSURE: 88 MMHG | HEART RATE: 68 BPM | TEMPERATURE: 98 F | OXYGEN SATURATION: 98 % | SYSTOLIC BLOOD PRESSURE: 144 MMHG

## 2022-04-13 DIAGNOSIS — L89.893 PRESSURE INJURY OF LEFT FOOT, STAGE 3 (HCC): Primary | ICD-10-CM

## 2022-04-13 PROCEDURE — 99212 OFFICE O/P EST SF 10 MIN: CPT

## 2022-04-13 PROCEDURE — 99213 OFFICE O/P EST LOW 20 MIN: CPT | Performed by: SURGERY

## 2022-04-13 RX ORDER — SILVER SULFADIAZINE 10 G/1000G
CREAM TOPICAL ONCE
Status: CANCELLED | OUTPATIENT
Start: 2022-04-13 | End: 2022-04-13

## 2022-04-13 RX ORDER — LIDOCAINE 40 MG/G
CREAM TOPICAL ONCE
Status: CANCELLED | OUTPATIENT
Start: 2022-04-13 | End: 2022-04-13

## 2022-04-13 RX ORDER — BACITRACIN ZINC AND POLYMYXIN B SULFATE 500; 1000 [USP'U]/G; [USP'U]/G
OINTMENT TOPICAL ONCE
Status: CANCELLED | OUTPATIENT
Start: 2022-04-13 | End: 2022-04-13

## 2022-04-13 RX ORDER — TRIAMCINOLONE ACETONIDE 1 MG/G
OINTMENT TOPICAL ONCE
Status: CANCELLED | OUTPATIENT
Start: 2022-04-13 | End: 2022-04-13

## 2022-04-13 RX ORDER — LIDOCAINE 50 MG/G
OINTMENT TOPICAL ONCE
Status: CANCELLED | OUTPATIENT
Start: 2022-04-13 | End: 2022-04-13

## 2022-04-13 RX ORDER — CLOBETASOL PROPIONATE 0.5 MG/G
OINTMENT TOPICAL ONCE
Status: CANCELLED | OUTPATIENT
Start: 2022-04-13 | End: 2022-04-13

## 2022-04-13 RX ORDER — BACITRACIN 500 [USP'U]/G
OINTMENT TOPICAL ONCE
Status: CANCELLED | OUTPATIENT
Start: 2022-04-13 | End: 2022-04-13

## 2022-04-13 RX ORDER — BETAMETHASONE DIPROPIONATE 0.5 MG/G
OINTMENT TOPICAL ONCE
Status: CANCELLED | OUTPATIENT
Start: 2022-04-13 | End: 2022-04-13

## 2022-04-13 RX ORDER — LIDOCAINE HYDROCHLORIDE 20 MG/ML
JELLY TOPICAL ONCE
Status: CANCELLED | OUTPATIENT
Start: 2022-04-13 | End: 2022-04-13

## 2022-04-13 RX ORDER — CEPHALEXIN 500 MG/1
500 CAPSULE ORAL 3 TIMES DAILY
COMMUNITY
End: 2022-04-27

## 2022-04-13 RX ORDER — MUPIROCIN 20 MG/G
OINTMENT TOPICAL ONCE
Status: CANCELLED | OUTPATIENT
Start: 2022-04-13 | End: 2022-04-13

## 2022-04-13 RX ORDER — GENTAMICIN SULFATE 1 MG/G
OINTMENT TOPICAL ONCE
Status: CANCELLED | OUTPATIENT
Start: 2022-04-13 | End: 2022-04-13

## 2022-04-13 RX ORDER — LIDOCAINE HYDROCHLORIDE 40 MG/ML
SOLUTION TOPICAL ONCE
Status: CANCELLED | OUTPATIENT
Start: 2022-04-13 | End: 2022-04-13

## 2022-04-13 NOTE — DISCHARGE INSTRUCTIONS
Left great toe:  Continue iodosorb and cover dressing  Change daily    -Consider Hammer toe surgery with Podiatrist to help with long term healing of toe.

## 2022-04-13 NOTE — WOUND CARE
04/13/22 0822   Wound Toe (Comment  which one) Left Great Toe   Date First Assessed/Time First Assessed: 03/02/22 0904   Present on Hospital Admission: Yes  Primary Wound Type: Blister/bullae  Location: Toe (Comment  which one)  Wound Location Orientation: Left  Wound Description: Great Toe   Wound Image    Wound Etiology   (neuropathic)   Dressing Status Old drainage noted   Cleansed Cleansed with saline   Dressing/Treatment   (acticoat)   Offloading for Diabetic Foot Ulcers Diabetic shoes/inserts   Wound Length (cm) 0.5 cm   Wound Width (cm) 0.5 cm   Wound Depth (cm) 0.5 cm   Wound Surface Area (cm^2) 0.25 cm^2   Change in Wound Size % 91.67   Wound Volume (cm^3) 0.125 cm^3   Wound Assessment Pale granulation tissue   Drainage Amount Small   Drainage Description Serous   Wound Odor None   Ora-Wound/Incision Assessment Hyperkeratosis (Callous)   Wound Thickness Description Full thickness       Wound undermines from 7-9 o'clock 7cm.

## 2022-04-13 NOTE — WOUND CARE
[address]  [address], [address]   Phone: [phone]  Fax: [phone]    Patient: Lea Brian MRN: [id number]  SSN: [id number]    YOB: 1950  Age: 70 y.o. Sex: male       Return Appointment: 2 weeks with Radha Burleson DPM    Instructions:   Left great toe:  Continue iodosorb and cover dressing  Change daily    -Consider Hammer toe surgery with Podiatrist to help with long term healing of toe. Should you experience increased redness, swelling, pain, foul odor, size of wound(s), or have a temperature over 101 degrees please contact the [address] [place] [address] Road at [phone] or if after hours contact your primary care physician or go to the hospital emergency department.     Signed By: Hector Méndez RN     April 13, 2022

## 2022-04-13 NOTE — PROGRESS NOTES
[address], [address], [address] [place] [address] Rd  [phone]    Progress Notes   Sydnee Al       MRN: [id number]     : 1950     Age: 70 y.o. Subjective/HPI:   This patient is a 70 y.o. male with diabetes seen and evaluated at the wound clinic for initial evaluation of an ulcer of the left great toe that was first noted yesterday. He has had some bloody drainage from the area. Patient does not recall injuring his toe. He was seen by his podiatrist for a abscess of his toenail and started on Keflex. The patient does wear diabetic shoes with inserts. He was previously seen by Dr. Tanisha Hernandez for a diabetic ulcer over the first metatarsal head which was noted to be healed on his last visit 2022. Patient reports no significant changes in overall health since he was last seen in the wound center. Review of Systems  A comprehensive review of systems was negative except for that written in the HPI. Past Medical History:   Diagnosis Date    Idiopathic chronic venous hypertension of left lower extremity with ulcer and inflammation ([address] 2022    Peptic ulcer disease     Pressure injury of left foot, stage 3 ([address] 3/9/2022    Ulcer of left heel, with fat layer exposed ([address] 2022      Past Surgical History:   Procedure Laterality Date    HX HERNIA REPAIR      hiatal hernia repair      No Known Allergies   Social History     Tobacco Use    Smoking status: Never Smoker    Smokeless tobacco: Former User   Substance Use Topics    Alcohol use: Yes      Social History     Social History Narrative    Not on file     History reviewed. No pertinent family history. Cannot display prior to admission medications because the patient has not been admitted in this contact. Current Outpatient Medications   Medication Sig    cephALEXin (KEFLEX) 500 mg capsule Take 500 mg by mouth three (3) times daily.      No current facility-administered medications for this encounter. Objective:     Vitals:    04/13/22 0820   BP: (!) 144/88   Pulse: 68   Resp: 18   Temp: 98 °F (36.7 °C)   SpO2: 98%       Physical Exam:  Ambulation: Normal  Gen- the patient is well developed and in no acute distress  HEENT- no focal abnormalities of the scalp or face. Neck- no JVD or retractions  Lungs- normal respiratory effort. Cardiovascular:  Lower limb pulses: 2+. Abd- soft and no masses evident. Ext- warm without cyanosis. There is no significant lower leg edema. Skin- no jaundice or rashes. Diabetic ulcer tip of left great toe, no active infection. Please see the specific measurements and descriptions of the wound(s) below. There is no evidence of periwound induration or warmth. No purulence or odor. Neuro- alert and oriented x 3. No gross imotor deficits are present. Lower limb sensation is decreased. Psychological: Affect normal. Mood normal. Memory intact.      Wound Toe (Comment  which one) Left Great Toe (Active)   Wound Image   04/13/22 0822   Wound Etiology Other (Comment) 03/30/22 0828   Dressing Status Old drainage noted 04/13/22 0822   Cleansed Cleansed with saline 04/13/22 0822   Dressing/Treatment Open to air 03/02/22 0902   Offloading for Diabetic Foot Ulcers Diabetic shoes/inserts 04/13/22 0719   Wound Length (cm) 0.5 cm 04/13/22 0822   Wound Width (cm) 0.5 cm 04/13/22 0822   Wound Depth (cm) 0.5 cm 04/13/22 0822   Wound Surface Area (cm^2) 0.25 cm^2 04/13/22 0822   Change in Wound Size % 91.67 04/13/22 0822   Wound Volume (cm^3) 0.125 cm^3 04/13/22 0822   Post-Procedure Length (cm) 0.4 cm 03/16/22 0816   Post-Procedure Width (cm) 0.4 cm 03/16/22 0816   Post-Procedure Depth (cm) 0.1 cm 03/16/22 0816   Post-Procedure Surface Area (cm^2) 0.16 cm^2 03/16/22 0816   Post-Procedure Volume (cm^3) 0.016 cm^3 03/16/22 0816   Undermining Starts ___ O'Clock 7 o'clock 04/13/22 0822   Undermining Ends ___ O'Clock 9 o'clock 04/13/22 3286 Undermining Maximum Distance (cm) 0.7 cm 04/13/22 0822   Wound Assessment Pale granulation tissue 04/13/22 0822   Drainage Amount Small 04/13/22 0822   Drainage Description Serous 04/13/22 0822   Wound Odor None 04/13/22 0822   Ora-Wound/Incision Assessment Hyperkeratosis (Callous) 04/13/22 0822   Wound Thickness Description Full thickness 04/13/22 0822   Number of days: 42       [REMOVED] Wound Foot Left;Plantar #1 (Removed)   Wound Image   03/23/22 0913   Wound Etiology Other (Comment) 03/23/22 0913   Dressing Status Clean;Dry; Intact 03/23/22 0913   Cleansed Cleansed with saline 03/23/22 0913   Dressing/Treatment Band-Aid/Adhesive bandage 01/12/22 1054   Offloading for Diabetic Foot Ulcers Diabetic shoes/inserts 03/23/22 0913   Wound Length (cm) 0 cm 03/23/22 0913   Wound Width (cm) 0 cm 03/23/22 0913   Wound Depth (cm) 0 cm 03/23/22 0913   Wound Surface Area (cm^2) 0 cm^2 03/23/22 0913   Change in Wound Size % 100 03/23/22 0913   Wound Volume (cm^3) 0 cm^3 03/23/22 0913   Wound Healing % 100 03/23/22 0913   Post-Procedure Length (cm) 0.2 cm 03/16/22 0816   Post-Procedure Width (cm) 0.2 cm 03/16/22 0816   Post-Procedure Depth (cm) 0.1 cm 03/16/22 0816   Post-Procedure Surface Area (cm^2) 0.04 cm^2 03/16/22 0816   Post-Procedure Volume (cm^3) 0.004 cm^3 03/16/22 0816   Undermining Starts ___ O'Clock 1 o'clock 03/02/22 0902   Undermining Ends ___ O'Clock 12 o'clock 03/02/22 0902   Undermining Maximum Distance (cm) 0.7 cm 03/02/22 0902   Wound Assessment Epithelialization 03/23/22 0913   Drainage Amount None 03/23/22 0913   Drainage Description Serous 03/09/22 0922   Wound Odor None 03/23/22 0913   Ora-Wound/Incision Assessment Intact 03/23/22 0913   Wound Thickness Description Full thickness 03/18/22 0842   Number of days: 70        Assessment:   Diabetic ulcer left great toe  Patient Active Problem List   Diagnosis Code    Idiopathic chronic venous hypertension of left lower extremity with ulcer and inflammation ([address] I87.332, L97.929    Ulcer of left heel, with fat layer exposed ([address] L97.422    Pressure injury of left foot, stage 3 ([address] B73.074     Plan:   Plan to start dressing changes with Iodosorb, continue use of diabetic shoes with inserts, follow-up in 2 weeks for recheck. Follow-up Information     Follow up With Specialties Details Why Contact Info    [facility] [address] [facility] In 2 weeks  [address] [facility] [address] [phone]            Thank you very much for the opportunity to participate in this patient's care. Signed By: Rolan Gaspar MD     April 13, 2022        TIME:  If total time for today's E/M service is used for level of service it is documented below. This time includes physician non-face-to-face service time visit on the date of service and includes    Preparing to see the patient (eg, review of tests)  Obtaining and/or reviewing separately obtained history  Performing a medically necessary appropriate examination and/or evaluation  Counseling and educating the patient/family/caregiver  Ordering medications, tests, or procedures  Referring and communicating with other health care professionals as needed  Documenting clinical information in the electronic or other health record  Independently interpreting results (not reported separately) and communicating results to the patient/family/caregiver  Care coordination (not reported separately)    E/M time =       20   minutes.

## 2022-04-27 ENCOUNTER — HOSPITAL ENCOUNTER (OUTPATIENT)
Dept: WOUND CARE | Age: 72
Discharge: HOME | End: 2022-04-27
Attending: PODIATRIST
Payer: MEDICARE

## 2022-04-27 VITALS
BODY MASS INDEX: 21.17 KG/M2 | HEIGHT: 74 IN | RESPIRATION RATE: 18 BRPM | HEART RATE: 69 BPM | DIASTOLIC BLOOD PRESSURE: 89 MMHG | SYSTOLIC BLOOD PRESSURE: 136 MMHG | TEMPERATURE: 98.5 F | OXYGEN SATURATION: 98 % | WEIGHT: 165 LBS

## 2022-04-27 DIAGNOSIS — L89.893 PRESSURE INJURY OF LEFT FOOT, STAGE 3 (HCC): Primary | ICD-10-CM

## 2022-04-27 PROCEDURE — 99212 OFFICE O/P EST SF 10 MIN: CPT

## 2022-04-27 RX ORDER — SILVER SULFADIAZINE 10 G/1000G
CREAM TOPICAL ONCE
Status: CANCELLED | OUTPATIENT
Start: 2022-04-27 | End: 2022-04-27

## 2022-04-27 RX ORDER — LIDOCAINE 50 MG/G
OINTMENT TOPICAL ONCE
Status: CANCELLED | OUTPATIENT
Start: 2022-04-27 | End: 2022-04-27

## 2022-04-27 RX ORDER — TRIAMCINOLONE ACETONIDE 1 MG/G
OINTMENT TOPICAL ONCE
Status: CANCELLED | OUTPATIENT
Start: 2022-04-27 | End: 2022-04-27

## 2022-04-27 RX ORDER — LIDOCAINE HYDROCHLORIDE 20 MG/ML
JELLY TOPICAL ONCE
Status: CANCELLED | OUTPATIENT
Start: 2022-04-27 | End: 2022-04-27

## 2022-04-27 RX ORDER — LIDOCAINE 40 MG/G
CREAM TOPICAL ONCE
Status: CANCELLED | OUTPATIENT
Start: 2022-04-27 | End: 2022-04-27

## 2022-04-27 RX ORDER — MUPIROCIN 20 MG/G
OINTMENT TOPICAL ONCE
Status: CANCELLED | OUTPATIENT
Start: 2022-04-27 | End: 2022-04-27

## 2022-04-27 RX ORDER — GENTAMICIN SULFATE 1 MG/G
OINTMENT TOPICAL ONCE
Status: CANCELLED | OUTPATIENT
Start: 2022-04-27 | End: 2022-04-27

## 2022-04-27 RX ORDER — LIDOCAINE HYDROCHLORIDE 40 MG/ML
SOLUTION TOPICAL ONCE
Status: CANCELLED | OUTPATIENT
Start: 2022-04-27 | End: 2022-04-27

## 2022-04-27 RX ORDER — BETAMETHASONE DIPROPIONATE 0.5 MG/G
OINTMENT TOPICAL ONCE
Status: CANCELLED | OUTPATIENT
Start: 2022-04-27 | End: 2022-04-27

## 2022-04-27 RX ORDER — CLOBETASOL PROPIONATE 0.5 MG/G
OINTMENT TOPICAL ONCE
Status: CANCELLED | OUTPATIENT
Start: 2022-04-27 | End: 2022-04-27

## 2022-04-27 RX ORDER — BACITRACIN 500 [USP'U]/G
OINTMENT TOPICAL ONCE
Status: CANCELLED | OUTPATIENT
Start: 2022-04-27 | End: 2022-04-27

## 2022-04-27 RX ORDER — BACITRACIN ZINC AND POLYMYXIN B SULFATE 500; 1000 [USP'U]/G; [USP'U]/G
OINTMENT TOPICAL ONCE
Status: CANCELLED | OUTPATIENT
Start: 2022-04-27 | End: 2022-04-27

## 2022-04-27 NOTE — WOUND CARE
[address]  [address], [address]  Phone: [phone]  Fax: [phone]    Patient: Reena Bazzi MRN: [id number]  SSN: [id number]    YOB: 1950  Age: 70 y.o. Sex: male       Return Appointment: Discharge from wound center at this time. Instructions: Left great toe:  Wound is healed! Continue to file callused area with file 1-2 times per week. Wear diabetic shoes with inserts at all times when walking. Should you experience increased redness, swelling, pain, foul odor, size of wound(s), or have a temperature over 101 degrees please contact the [address] [place] [address] Road at [phone] or if after hours contact your primary care physician or go to the hospital emergency department.     Signed By: Abbie Cobb PT, [facility]     April 27, 2022

## 2022-04-27 NOTE — PROGRESS NOTES
[address], [address] RECORD NUMBER:  [id number]  AGE: 70 y.o. RACE BLACK/  GENDER: male  : 1950  EPISODE DATE:  2022    Subjective:     Chief Complaint   Patient presents with    Wound Check     Left great toe wound        Brenda Vázquez is a 70 y.o. BLACK/ male who presents with concerns over the callusing to the left hallux and sub 1st metatarsal head. He continues under the care of his podiatrist. He is wearing custom insoles. He continues in the compression stocking. He denies any bleeding or drainage. HISTORY of PRESENT ILLNESS HPI    Nature: Painless  Location: left sub 1st metatarsal head, left distal hallux  Duration:   Onset: Patient states it started as unknown  Course: resolved  Aggravating/Alleviating: None reported  Treatment: iodosorb, compression    Ulcer Identification:  Ulcer Type: venous, pressure and neuropathic    Contributing Factors: edema, chronic pressure and shear force    Wound: venous          PAST MEDICAL HISTORY    Past Medical History:   Diagnosis Date    Idiopathic chronic venous hypertension of left lower extremity with ulcer and inflammation ([address] 2022    Peptic ulcer disease     Pressure injury of left foot, stage 3 ([address] 3/9/2022    Ulcer of left heel, with fat layer exposed ([address] 2022        PAST SURGICAL HISTORY    Past Surgical History:   Procedure Laterality Date    HX HERNIA REPAIR      hiatal hernia repair       FAMILY HISTORY    History reviewed. No pertinent family history. SOCIAL HISTORY    Social History     Tobacco Use    Smoking status: Never Smoker    Smokeless tobacco: Former User   Substance Use Topics    Alcohol use: Yes    Drug use: Not on file       ALLERGIES    No Known Allergies    MEDICATIONS    No current outpatient medications on file prior to encounter.      No current facility-administered medications on file prior to encounter. REVIEW OF SYSTEMS    Pertinent items are noted in HPI. Objective:     Visit Vitals  /89 (BP Patient Position: Sitting)   Pulse 69   Temp 98.5 °F (36.9 °C)   Resp 18   Ht 6' 2\" (1.88 m)   Wt 74.8 kg (165 lb)   SpO2 98%   BMI 21.18 kg/m²       Wt Readings from Last 3 Encounters:   04/27/22 74.8 kg (165 lb)   03/30/22 75.8 kg (167 lb)   03/23/22 74.8 kg (165 lb)       PHYSICAL EXAM    General: well developed, well nourished, pleasant , NAD. Hygiene good  Psych: cooperative. Pleasant. No anxiety or depression. Normal mood and affect. HEENT: Normocephalic, atraumatic. EOMI. Conjunctiva clear. No scleral icterus. Neck: Normal range of motion. No masses. Chest: Good air entry bilaterally. Respirations nonlabored  Cardio: Normal heart sounds,no rubs, murmurs or gallops  Abdomen: Soft, nontender, nondistended, normoactive bowel sounds    Vascular: DP and PT pulses are palpable at 2/4 bilateral. Skin temperature is uniform from proximal to distal bilateral. Hair growth is absent bilateral. No erythema, focal edema, heat is appreciated bilateral. No evidence of cellulitis. Derm: No paronychial infections are noted. Skin is atrophic and xerotic. No subcutaneous masses or hyperpigmented lesions are present. Neuro: Epicritic sensation is absent bilateral. Protective sensation is absent with 5.07 SWMF testing to all 10 sites bilateral. Muscle tone and bulk is symmetric bilateral.  Msk: Muscle strength is 5/5 for all prime movers of the foot bilateral. No effusions are palpable. Prior full thickness ulceration to the sub 1st metatarsal head and distal tip hallux left foot with epithelium to the base and normal rim. No bleeding. No drainage. Rigid contracture of the hallux IPJ and lesser digits PIPJ with plantar flexed metatarsals.      Wound Toe (Comment  which one) Left Great Toe (Active)   Wound Image   04/27/22 1040   Wound Etiology Other (Comment) 04/27/22 1040   Dressing Status Clean;Dry 04/27/22 1040   Cleansed Cleansed with saline 04/27/22 1040   Dressing/Treatment Open to air 03/02/22 0902   Offloading for Diabetic Foot Ulcers Diabetic shoes/inserts 04/27/22 1040   Wound Length (cm) 0 cm 04/27/22 1040   Wound Width (cm) 0 cm 04/27/22 1040   Wound Depth (cm) 0 cm 04/27/22 1040   Wound Surface Area (cm^2) 0 cm^2 04/27/22 1040   Change in Wound Size % 100 04/27/22 1040   Wound Volume (cm^3) 0 cm^3 04/27/22 1040   Post-Procedure Length (cm) 0.4 cm 03/16/22 0816   Post-Procedure Width (cm) 0.4 cm 03/16/22 0816   Post-Procedure Depth (cm) 0.1 cm 03/16/22 0816   Post-Procedure Surface Area (cm^2) 0.16 cm^2 03/16/22 0816   Post-Procedure Volume (cm^3) 0.016 cm^3 03/16/22 0816   Undermining Starts ___ O'Clock 7 o'clock 04/13/22 0822   Undermining Ends ___ O'Clock 9 o'clock 04/13/22 0822   Undermining Maximum Distance (cm) 0.7 cm 04/13/22 0822   Wound Assessment Epithelialization 04/27/22 1040   Drainage Amount None 04/27/22 1040   Drainage Description Serous 04/13/22 0822   Wound Odor None 04/27/22 1040   Ora-Wound/Incision Assessment Hyperkeratosis (Callous) 04/13/22 0822   Wound Thickness Description Full thickness 04/13/22 0822   Number of days: 56          DATA REVIEW:  No results found for this or any previous visit (from the past 336 hour(s)). LEFT FOOT 3 view(s).     INDICATION: Wound to bottom of left foot and ankle swelling, possible  osteomyelitis first metatarsal head     TECHNIQUE: AP and lateral and oblique views.     COMPARISON: None.     FINDINGS: No periostitis of the first metatarsal. No cortical erosions. There  are small subchondral cysts. Degenerative changes osteophytes. Osteopenia. Hammertoe deformities. Arthritis of the tibiotalar talonavicular joints.     IMPRESSION  Negative for acute osteomyelitis. Degenerative changes. .    Assessment/Plan:      Problem List Items Addressed This Visit        Integumentary    Pressure injury of left foot, stage 3 (HCC) - Primary    Relevant Orders    INITIATE OUTPATIENT WOUND CARE PROTOCOL        Wounds remain healed. He is to keep his follow up with his podiatrist and establish routine care there to prevent recurrence. Remain in modified insoles at all times. Return here as needed. Patient instructed on the following:    Should you experience increased redness, swelling, pain, foul odor, size of wound(s), or have a temperature over 101 degrees please contact the [address] [place] [address] Road at [phone] or if after hours contact your primary care physician or go to the hospital emergency department. Orders Placed This Encounter    INITIATE OUTPATIENT WOUND CARE PROTOCOL     Cleanse wound with saline. If wound contains bioburden or contamination, cleanse with wound cleanser or antimicrobial solution. For normal periwound tissue without irritation nor maceration, apply topical skin protectant. For periwound tissue with irritation and/or maceration, apply zinc based product, topical steroid cream/ointment, or equivalent. For wounds with dry firm black eschar and/or without exudate, apply betadine and leave open to air.     For wounds with scant/small to no exudate or drainage, apply wound gel, hydrocolloid, polymer, or equivalent and cover with secondary dressing/foam.    For wounds with moderate/large exudate or drainage, apply alginate, hydrofiber, polymer, or equivalent and cover with secondary dressing/foam.    For wounds with nonviable tissue requiring removal, apply chemical or mechanical debrider and cover with secondary dressing/foam.    For wounds with tunneling, dead space, or cavity, fill or pack with strip/guaze/kerlix to fit and cover with secondary dressing/foam.    For wounds with adequate granulation or epithelization, apply wound gel, hydrocolloid, polymer, collagen, or transparent film, and cover secondary dry dressing/foam.    For wounds that need additional secondary dressing to help pad or control additional drainage/exudates, add foam, absorbent pad or hydrocolloid. For wounds with suspected or know infection, apply antimicrobial mesh and/or antimicrobial alginate/hydrofiber, or antimicrobial solution moistened gauze/kerlix, or equivalent and cover with secondary dressing foam.    Compression management needed for edema control, apply multilayer compression or tubular garment or equivalent. Offloading Management needed for pressure relief, apply offloading shoe/boot or equivalent. Standing Status:   Standing     Number of Occurrences:   1       Follow-up Information     Follow up With Specialties Details Why Contact Info    [facility]ound Care  Discharge from wound center at this time. [id number] [facility] [address] [phone]       Treatment Note please see attached Discharge Instructions    Written patient dismissal instructions given to patient or POA.          Electronically signed by Merlene Garcia DPM on 4/27/2022 at 11:51 AM

## 2022-04-27 NOTE — DISCHARGE INSTRUCTIONS
[address]  [address], [address]   Phone: [phone]  Fax: [phone]    Patient: Jonn Nunez MRN: [id number]  SSN: [id number]    YOB: 1950  Age: 70 y.o. Sex: male       Return Appointment: Discharge from wound center at this time. Instructions: Left great toe:  Wound is healed! Continue to file callused area with file 1-2 times per week. Wear diabetic shoes with inserts at all times when walking. Discharge from wound center at this time    Should you experience increased redness, swelling, pain, foul odor, size of wound(s), or have a temperature over 101 degrees please contact the [address] [place] [address] Road at [phone] or if after hours contact your primary care physician or go to the hospital emergency department.     Signed By: Orion Corbett PT, [facility]     April 27, 2022

## 2022-06-07 ENCOUNTER — HOSPITAL ENCOUNTER (OUTPATIENT)
Dept: WOUND CARE | Age: 72
Setting detail: RECURRING SERIES
Discharge: HOME | End: 2022-06-07
Payer: MEDICARE

## 2022-06-07 VITALS
SYSTOLIC BLOOD PRESSURE: 145 MMHG | BODY MASS INDEX: 21.43 KG/M2 | HEART RATE: 73 BPM | TEMPERATURE: 97.8 F | WEIGHT: 167 LBS | OXYGEN SATURATION: 99 % | DIASTOLIC BLOOD PRESSURE: 84 MMHG | HEIGHT: 74 IN | RESPIRATION RATE: 16 BRPM

## 2022-06-07 DIAGNOSIS — L89.893 PRESSURE INJURY OF LEFT FOOT, STAGE 3 (HCC): Primary | ICD-10-CM

## 2022-06-07 PROCEDURE — 99213 OFFICE O/P EST LOW 20 MIN: CPT | Performed by: SURGERY

## 2022-06-07 PROCEDURE — 99213 OFFICE O/P EST LOW 20 MIN: CPT

## 2022-06-07 RX ORDER — LIDOCAINE 40 MG/G
CREAM TOPICAL ONCE
Status: CANCELLED | OUTPATIENT
Start: 2022-06-07 | End: 2022-06-07

## 2022-06-07 RX ORDER — LIDOCAINE 50 MG/G
OINTMENT TOPICAL ONCE
Status: CANCELLED | OUTPATIENT
Start: 2022-06-07 | End: 2022-06-07

## 2022-06-07 RX ORDER — BETAMETHASONE DIPROPIONATE 0.05 %
OINTMENT (GRAM) TOPICAL ONCE
Status: CANCELLED | OUTPATIENT
Start: 2022-06-07 | End: 2022-06-07

## 2022-06-07 RX ORDER — LIDOCAINE 40 MG/G
CREAM TOPICAL ONCE
OUTPATIENT
Start: 2022-06-07 | End: 2022-06-07

## 2022-06-07 RX ORDER — LIDOCAINE HYDROCHLORIDE 40 MG/ML
SOLUTION TOPICAL ONCE
Status: CANCELLED | OUTPATIENT
Start: 2022-06-07 | End: 2022-06-07

## 2022-06-07 RX ORDER — LIDOCAINE 50 MG/G
OINTMENT TOPICAL ONCE
OUTPATIENT
Start: 2022-06-07 | End: 2022-06-07

## 2022-06-07 RX ORDER — CEPHALEXIN 500 MG/1
1 CAPSULE ORAL 3 TIMES DAILY
COMMUNITY
Start: 2022-04-06

## 2022-06-07 RX ORDER — BACITRACIN, NEOMYCIN, POLYMYXIN B 400; 3.5; 5 [USP'U]/G; MG/G; [USP'U]/G
OINTMENT TOPICAL ONCE
OUTPATIENT
Start: 2022-06-07 | End: 2022-06-07

## 2022-06-07 RX ORDER — LIDOCAINE HYDROCHLORIDE 20 MG/ML
JELLY TOPICAL ONCE
Status: CANCELLED | OUTPATIENT
Start: 2022-06-07 | End: 2022-06-07

## 2022-06-07 RX ORDER — BACITRACIN ZINC AND POLYMYXIN B SULFATE 500; 1000 [USP'U]/G; [USP'U]/G
OINTMENT TOPICAL ONCE
Status: CANCELLED | OUTPATIENT
Start: 2022-06-07 | End: 2022-06-07

## 2022-06-07 RX ORDER — LIDOCAINE HYDROCHLORIDE 20 MG/ML
JELLY TOPICAL ONCE
OUTPATIENT
Start: 2022-06-07 | End: 2022-06-07

## 2022-06-07 RX ORDER — ACAMPROSATE CALCIUM 333 MG/1
666 TABLET, DELAYED RELEASE ORAL 3 TIMES DAILY
COMMUNITY
Start: 2021-04-13

## 2022-06-07 RX ORDER — BACITRACIN ZINC AND POLYMYXIN B SULFATE 500; 1000 [USP'U]/G; [USP'U]/G
OINTMENT TOPICAL ONCE
OUTPATIENT
Start: 2022-06-07 | End: 2022-06-07

## 2022-06-07 RX ORDER — LIDOCAINE HYDROCHLORIDE 40 MG/ML
SOLUTION TOPICAL ONCE
OUTPATIENT
Start: 2022-06-07 | End: 2022-06-07

## 2022-06-07 RX ORDER — GENTAMICIN SULFATE 1 MG/G
OINTMENT TOPICAL ONCE
Status: CANCELLED | OUTPATIENT
Start: 2022-06-07 | End: 2022-06-07

## 2022-06-07 RX ORDER — GINSENG 100 MG
CAPSULE ORAL ONCE
OUTPATIENT
Start: 2022-06-07 | End: 2022-06-07

## 2022-06-07 RX ORDER — BACITRACIN, NEOMYCIN, POLYMYXIN B 400; 3.5; 5 [USP'U]/G; MG/G; [USP'U]/G
OINTMENT TOPICAL ONCE
Status: CANCELLED | OUTPATIENT
Start: 2022-06-07 | End: 2022-06-07

## 2022-06-07 RX ORDER — GINSENG 100 MG
CAPSULE ORAL ONCE
Status: CANCELLED | OUTPATIENT
Start: 2022-06-07 | End: 2022-06-07

## 2022-06-07 RX ORDER — BETAMETHASONE DIPROPIONATE 0.05 %
OINTMENT (GRAM) TOPICAL ONCE
OUTPATIENT
Start: 2022-06-07 | End: 2022-06-07

## 2022-06-07 RX ORDER — GENTAMICIN SULFATE 1 MG/G
OINTMENT TOPICAL ONCE
OUTPATIENT
Start: 2022-06-07 | End: 2022-06-07

## 2022-06-07 RX ORDER — CLOBETASOL PROPIONATE 0.5 MG/G
OINTMENT TOPICAL ONCE
Status: CANCELLED | OUTPATIENT
Start: 2022-06-07 | End: 2022-06-07

## 2022-06-07 RX ORDER — CLOBETASOL PROPIONATE 0.5 MG/G
OINTMENT TOPICAL ONCE
OUTPATIENT
Start: 2022-06-07 | End: 2022-06-07

## 2022-06-07 NOTE — FLOWSHEET NOTE
06/07/22 0828   Wound 06/07/22 Foot Left;Plantar #1 left 1st met head   Date First Assessed/Time First Assessed: 06/07/22 0828   Present on Hospital Admission: Yes  Wound Approximate Age at First Assessment (Weeks): 1 weeks  Primary Wound Type: Neuropathic  Location: Foot  Wound Location Orientation: Left;Plantar  Wound D...    Wound Image    Wound Etiology Other  (neuropathic)   Dressing/Treatment Adhesive bandage   Wound Length (cm) 0.6 cm   Wound Width (cm) 0.3 cm   Wound Depth (cm) 0.1 cm   Wound Surface Area (cm^2) 0.18 cm^2   Wound Volume (cm^3) 0.018 cm^3   Wound Assessment Epithelialization;Pink/red   Drainage Amount Small   Drainage Description Serosanguinous   Odor None   Leidy-wound Assessment Hyperkeratosis (callous)

## 2022-06-07 NOTE — WOUND CARE
Discharge Instructions for  Walt Yates  [address] [place] [address]  [address], [address]  Phone [phone]   Fax [phone]      NAME:  Alin Hyde OF BIRTH:  1950  MEDICAL RECORD NUMBER:  [id number]  DATE:  6/7/2022    Return Appointment:   As needed with Celia Luna DPM      Instructions: Left 1st met head  Cleanse wound and periwound with wound cleanser or normal saline. Acticoat Flex 3: cut top approximate size of wound, apply to wound bed. Secure with gauze and tape. Change every other day or as needed. Paint the left great toe with betadine daily and monitor for any drainage. Limit your walking as much as possible. Follow up with Dr Nick Peterson in her private office as scheduled or here in the wound center if needed. Should you experience increased redness, swelling, pain, foul odor, size of wound(s), or have a temperature over 101 degrees please contact the [address] [place] [address] Road at [phone] or if after hours contact your primary care physician or go to the hospital emergency department. PLEASE NOTE: IF YOU ARE UNABLE TO OBTAIN WOUND SUPPLIES, CONTINUE TO USE THE SUPPLIES YOU HAVE AVAILABLE UNTIL YOU ARE ABLE TO REACH US. IT IS MOST IMPORTANT TO KEEP THE WOUND COVERED AT ALL TIMES.     Electronically signed Brigida Hopkins RN on 6/7/2022 at 9:05 AM

## 2022-06-07 NOTE — PROGRESS NOTES
[address], [address], [address] [place] [address] Rd  [phone]    Progress Notes   Shania Martínez       MRN: [id number]     : 1950     Age: 70 y.o. Subjective/HPI:   This patient is a 70 y.o. male seen and evaluated at the wound clinic for initial evaluation of ulcer over the head of the left first metatarsal.  He noted a blister in the area about 3 days ago and had some watery drainage. He also developed dark discoloration of the left great toe. He denies having any pain or discomfort nor is he noted any redness, swelling, or purulence. The patient does have an appointment with Dr. Nellie Stewart next week to discuss hammertoe surgery and has been previously treated by her for similar problems. He denies any recent changes in his overall health. The patient is not diabetic and does not smoke cigarettes. Patient does use diabetic shoes with inserts. Review of Systems  Pertinent items are noted in HPI. Past Medical History:   Diagnosis Date    Idiopathic chronic venous hypertension of left lower extremity with ulcer and inflammation ([address] 2022    Peptic ulcer disease     Pressure injury of left foot, stage 3 ([address] 3/9/2022    Ulcer of left heel, with fat layer exposed ([address] 2022      Past Surgical History:   Procedure Laterality Date    HERNIA REPAIR      hiatal hernia repair      No Known Allergies   Social History     Tobacco Use    Smoking status: Never Smoker    Smokeless tobacco: Former User   Substance Use Topics    Alcohol use: Yes      Social History     Social History Narrative    Not on file     History reviewed. No pertinent family history.    [unfilled]  Current Outpatient Medications   Medication Sig    acamprosate (CAMPRAL) 333 MG tablet Take 666 mg by mouth 3 times daily    cephALEXin (KEFLEX) 500 MG capsule Take 1 capsule by mouth 3 times daily     No current facility-administered medications for this encounter. Objective:     Vitals:    06/07/22 0810 06/07/22 0831   BP:  (!) 145/84   Pulse:  73   Resp:  16   Temp:  97.8 °F (36.6 °C)   TempSrc:  Temporal   SpO2:  99%   Weight: 167 lb (75.8 kg)    Height: 6' 2\" (1.88 m)        Physical Exam:  Ambulation: Normal  Gen- the patient is well developed and in no acute distress  HEENT- no focal abnormalities of the scalp or face. Neck- no JVD or retractions  Lungs- normal respiratory effort. Cardiovascular:  Lower limb pulses: 3+. Abd- soft and no masses evident. Ext- warm without cyanosis. There is no significant lower leg edema. Skin- no jaundice or rashes. Ulcer over head of first metatarsal, necrotic skin lateral aspect left great toe. Please see the specific measurements and descriptions of the wound(s) below. There is no evidence of periwound induration or warmth. No purulence or odor. Neuro- alert and oriented x 3. No gross motor deficits are present. Lower limb sensation is decreased. Psychological: Affect normal. Mood normal. Memory intact. Assessment:   Neuropathic ulcer left foot  Patient Active Problem List   Diagnosis    Pressure injury of left foot, stage 3 (HCC)    Ulcer of left heel, with fat layer exposed ([address]    Idiopathic chronic venous hypertension of left lower extremity with ulcer and inflammation ([address]     Plan:   Plan to start dressing changes with Acticoat, paint right great toe with Betadine, follow-up with Dr. Paras Dubois next week. [unfilled]      Thank you very much for the opportunity to participate in this patient's care. [unfilled]    TIME:  If total time for today's E/M service is used for level of service it is documented below.     This time includes physician non-face-to-face service time visit on the date of service and includes    Preparing to see the patient (eg, review of tests)  Obtaining and/or reviewing separately obtained history  Performing a medically necessary appropriate examination and/or evaluation  Counseling and educating the patient/family/caregiver  Ordering medications, tests, or procedures  Referring and communicating with other health care professionals as needed  Documenting clinical information in the electronic or other health record  Independently interpreting results (not reported separately) and communicating results to the patient/family/caregiver  Care coordination (not reported separately)    E/M time =     20     minutes.

## 2022-06-15 ENCOUNTER — HOSPITAL ENCOUNTER (OUTPATIENT)
Dept: WOUND CARE | Age: 72
Setting detail: RECURRING SERIES
Discharge: HOME | End: 2022-06-15
Payer: MEDICARE

## 2022-06-15 VITALS
DIASTOLIC BLOOD PRESSURE: 85 MMHG | HEIGHT: 74 IN | HEART RATE: 74 BPM | BODY MASS INDEX: 21.82 KG/M2 | SYSTOLIC BLOOD PRESSURE: 151 MMHG | WEIGHT: 170 LBS

## 2022-06-15 DIAGNOSIS — L89.893 PRESSURE INJURY OF LEFT FOOT, STAGE 3 (HCC): ICD-10-CM

## 2022-06-15 PROCEDURE — 99212 OFFICE O/P EST SF 10 MIN: CPT

## 2022-06-15 NOTE — WOUND CARE
Discharge Instructions for  Walt Yates  [address] [facility] [address]  [address], [address]  Phone [phone]   Fax [phone]      NAME:  Tawana Lorenzo OF BIRTH:  1950  MEDICAL RECORD NUMBER:  [id number]  DATE:  6/15/2022    Return Appointment:   Discharge with Gil Hollis DPM      Instructions: Left first met head healed may leave open to air  Left great toe paint with betadine daily  Continue wearing shoe inserts  Follow up with Dr. Jay Lacey at outpatient [facility]  Discharge from wound center today            Should you experience increased redness, swelling, pain, foul odor, size of wound(s), or have a temperature over 101 degrees please contact the [address] [place] [address] Road at [phone] or if after hours contact your primary care physician or go to the hospital emergency department. PLEASE NOTE: IF YOU ARE UNABLE TO OBTAIN WOUND SUPPLIES, CONTINUE TO USE THE SUPPLIES YOU HAVE AVAILABLE UNTIL YOU ARE ABLE TO REACH US. IT IS MOST IMPORTANT TO KEEP THE WOUND COVERED AT ALL TIMES.     Electronically signed Filomena Gonzalez RN on 6/15/2022 at 8:36 AM

## 2022-06-15 NOTE — FLOWSHEET NOTE
06/15/22 0823   Wound 06/07/22 Foot Left;Plantar #1 left 1st met head   Date First Assessed/Time First Assessed: 06/07/22 0828   Present on Hospital Admission: Yes  Wound Approximate Age at First Assessment (Weeks): 1 weeks  Primary Wound Type: Neuropathic  Location: Foot  Wound Location Orientation: Left;Plantar  Wound D...    Wound Image    Wound Etiology   (neuropathic)   Dressing Status Intact   Wound Cleansed Cleansed with saline   Dressing/Treatment   (acticoat)   Wound Length (cm) 0 cm   Wound Width (cm) 0 cm   Wound Depth (cm) 0 cm   Wound Surface Area (cm^2) 0 cm^2   Change in Wound Size % (l*w) 100   Wound Volume (cm^3) 0 cm^3   Wound Healing % 100   Wound Assessment Epithelialization   Drainage Amount None   Odor None   Leidy-wound Assessment Hyperkeratosis (callous)

## 2022-06-15 NOTE — PROGRESS NOTES
[address] [place] [address] Drive RECORD NUMBER:  [id number]  AGE: 70 y.o. GENDER: male  : 1950  EPISODE DATE:  6/15/2022    Subjective:     Chief Complaint   Patient presents with    Wound Check     left first met head 6/15/22        Loi Castorena is a 70 y.o. Black / [place] American male who presents having had recurrence of wound to the plantar hallux and sub 1st metatarsal head left foot. He saw Dr. Mathew Armendariz last week and reports healing since then. He is working wit his podiatrist for routine care and orthotics. He has made an appointment at my private office for consultation for hammertoe correction. He is still unsure if he wants to proceed in that direction. HISTORY of PRESENT ILLNESS HPI    Nature: Painless  Location: as above  Duration: 2 weeks  Onset: Patient states it started as callusing  Course: resolved  Aggravating/Alleviating: Worsened with pressure  Treatment: none currently    Ulcer Identification:  Ulcer Type: pressure    Contributing Factors: chronic pressure, decreased mobility and shear force    Wound: pressure stage 1         PAST MEDICAL HISTORY    Past Medical History:   Diagnosis Date    Idiopathic chronic venous hypertension of left lower extremity with ulcer and inflammation ([address] 2022    Peptic ulcer disease     Pressure injury of left foot, stage 3 ([address] 3/9/2022    Ulcer of left heel, with fat layer exposed ([address] 2022        PAST SURGICAL HISTORY    Past Surgical History:   Procedure Laterality Date    HERNIA REPAIR      hiatal hernia repair       FAMILY HISTORY    History reviewed. No pertinent family history. SOCIAL HISTORY    Social History     Tobacco Use    Smoking status: Never Smoker    Smokeless tobacco: Former User   Substance Use Topics    Alcohol use:  Yes    Drug use: Not on file       ALLERGIES    No Known Allergies    MEDICATIONS    Current Outpatient Medications on File Prior to Encounter   Medication Sig Dispense Refill    cephALEXin (KEFLEX) 500 MG capsule Take 1 capsule by mouth 3 times daily      acamprosate (CAMPRAL) 333 MG tablet Take 666 mg by mouth 3 times daily       No current facility-administered medications on file prior to encounter. REVIEW OF SYSTEMS    Pertinent items are noted in HPI. Objective:     BP (!) 151/85   Pulse 74   Ht 6' 2\" (1.88 m)   Wt 170 lb (77.1 kg)   BMI 21.83 kg/m²     Wt Readings from Last 3 Encounters:   06/15/22 170 lb (77.1 kg)   06/07/22 167 lb (75.8 kg)   03/09/22 169 lb (76.7 kg)       PHYSICAL EXAM    General: well developed, well nourished, pleasant , NAD. Hygiene good  Psych: cooperative. Pleasant. No anxiety or depression. Normal mood and affect. HEENT: Normocephalic, atraumatic. EOMI. Conjunctiva clear. No scleral icterus. Neck: Normal range of motion. No masses. Chest: Good air entry bilaterally. Respirations nonlabored  Cardio: Normal heart sounds,no rubs, murmurs or gallops  Abdomen: Soft, nontender, nondistended, normoactive bowel sounds    Vascular: DP and PT pulses are palpable at 2/4 bilateral. Skin temperature is uniform from proximal to distal bilateral. Hair growth is absent bilateral. No erythema, edema, heat is appreciated bilateral. No evidence of cellulitis. Derm: Nails 1-5 bilateral are thickened, discolored and with subungual debris. No paronychial infections are noted. Skin is atrophic and xerotic. No subcutaneous masses or hyperpigmented lesions are present.    Neuro: Epicritic sensation is present bilateral. Protective sensation is present with 5.07 SWMF testing to all 10 sites bilateral. Muscle tone and bulk is symmetric bilateral.  Msk: Muscle strength is 5/5 for all prime movers of the foot bilateral.     Prior ulcerations to the left foot at the distal tip hallux and the sub 1st metatarsal head with healing and epithelium to this encounter. Treatment Note please see attached Discharge Instructions    Written patient dismissal instructions given to patient or POA.          Electronically signed by Kamla Milligan DPM on 6/15/2022 at 8:44 AM

## 2022-12-14 ENCOUNTER — HOSPITAL ENCOUNTER (OUTPATIENT)
Dept: WOUND CARE | Age: 72
Discharge: HOME | End: 2022-12-14
Payer: MEDICARE

## 2022-12-14 VITALS
SYSTOLIC BLOOD PRESSURE: 138 MMHG | HEIGHT: 74 IN | WEIGHT: 175 LBS | HEART RATE: 71 BPM | TEMPERATURE: 98.4 F | BODY MASS INDEX: 22.46 KG/M2 | DIASTOLIC BLOOD PRESSURE: 85 MMHG

## 2022-12-14 PROCEDURE — 97597 DBRDMT OPN WND 1ST 20 CM/<: CPT

## 2022-12-14 RX ORDER — CELECOXIB 50 MG/1
CAPSULE ORAL
COMMUNITY
Start: 2022-10-11

## 2022-12-14 RX ORDER — LIDOCAINE 50 MG/G
OINTMENT TOPICAL ONCE
Status: CANCELLED | OUTPATIENT
Start: 2022-12-14 | End: 2022-12-14

## 2022-12-14 RX ORDER — GENTAMICIN SULFATE 1 MG/G
OINTMENT TOPICAL ONCE
Status: CANCELLED | OUTPATIENT
Start: 2022-12-14 | End: 2022-12-14

## 2022-12-14 RX ORDER — LIDOCAINE HYDROCHLORIDE 20 MG/ML
JELLY TOPICAL ONCE
Status: CANCELLED | OUTPATIENT
Start: 2022-12-14 | End: 2022-12-14

## 2022-12-14 RX ORDER — CLOBETASOL PROPIONATE 0.5 MG/G
OINTMENT TOPICAL ONCE
Status: CANCELLED | OUTPATIENT
Start: 2022-12-14 | End: 2022-12-14

## 2022-12-14 RX ORDER — BACITRACIN, NEOMYCIN, POLYMYXIN B 400; 3.5; 5 [USP'U]/G; MG/G; [USP'U]/G
OINTMENT TOPICAL ONCE
Status: CANCELLED | OUTPATIENT
Start: 2022-12-14 | End: 2022-12-14

## 2022-12-14 RX ORDER — BETAMETHASONE DIPROPIONATE 0.05 %
OINTMENT (GRAM) TOPICAL ONCE
Status: CANCELLED | OUTPATIENT
Start: 2022-12-14 | End: 2022-12-14

## 2022-12-14 RX ORDER — LIDOCAINE HYDROCHLORIDE 40 MG/ML
SOLUTION TOPICAL ONCE
Status: CANCELLED | OUTPATIENT
Start: 2022-12-14 | End: 2022-12-14

## 2022-12-14 RX ORDER — METOCLOPRAMIDE 10 MG/1
10 TABLET ORAL 2 TIMES DAILY
COMMUNITY
Start: 2022-07-20

## 2022-12-14 RX ORDER — LIDOCAINE 40 MG/G
CREAM TOPICAL ONCE
Status: CANCELLED | OUTPATIENT
Start: 2022-12-14 | End: 2022-12-14

## 2022-12-14 RX ORDER — LIDOCAINE HYDROCHLORIDE 20 MG/ML
JELLY TOPICAL PRN
Status: DISCONTINUED | OUTPATIENT
Start: 2022-12-14 | End: 2022-12-15 | Stop reason: HOSPADM

## 2022-12-14 RX ORDER — FUROSEMIDE 20 MG/1
20 TABLET ORAL DAILY PRN
COMMUNITY
Start: 2022-07-20

## 2022-12-14 RX ORDER — GINSENG 100 MG
CAPSULE ORAL ONCE
Status: CANCELLED | OUTPATIENT
Start: 2022-12-14 | End: 2022-12-14

## 2022-12-14 RX ORDER — BACITRACIN ZINC AND POLYMYXIN B SULFATE 500; 1000 [USP'U]/G; [USP'U]/G
OINTMENT TOPICAL ONCE
Status: CANCELLED | OUTPATIENT
Start: 2022-12-14 | End: 2022-12-14

## 2022-12-14 RX ADMIN — LIDOCAINE HYDROCHLORIDE: 20 JELLY TOPICAL at 09:14

## 2022-12-14 NOTE — DISCHARGE INSTRUCTIONS
Discharge Instructions for  Walt Yates  [address] [place] [address]  [address], [address]  Phone [phone]   Fax [phone]        NAME:  Adriana Adams OF BIRTH:  1950  MEDICAL RECORD NUMBER:  [id number]  DATE:  12/14/2022     Return Appointment:   1 week with Ted Voss DPM  Friday 12/16 & Wednesday 12/21 with clinician     Instructions: Left foot- great toe & 1st metatarsal head:  Cleanse with normal saline. Soak with Vashe wound solution for at least 15 seconds before dressing application. Acticoat Flex 3: cut top approximate size of wound, apply to wound bed. Cover with ABD & gauze. Do not get wound wet in shower, pool or tub. May purchase cast cover at local pharmacy to keep dry in shower. Apply 2-layer compression wrap. Have changed on Friday 12/16/22 at the clinic. Please call with any problems such as sliding or pain/ toe numbness. Please elevate leg when sitting. Increase dietary protein intake to at least 60 grams per day. May use supplements such as Ensure Max & Massimo (samples & coupons sent with patient). Should you experience increased redness, swelling, pain, foul odor, size of wound(s), or have a temperature over 101 degrees please contact the [address] [place] [address] Road at [phone] or if after hours contact your primary care physician or go to the hospital emergency department. PLEASE NOTE: IF YOU ARE UNABLE TO OBTAIN WOUND SUPPLIES, CONTINUE TO USE THE SUPPLIES YOU HAVE AVAILABLE UNTIL YOU ARE ABLE TO REACH US. IT IS MOST IMPORTANT TO KEEP THE WOUND COVERED AT ALL TIMES.      Electronically signed Hossein Tamayo RN on 12/14/2022 at 9:15 AM

## 2022-12-14 NOTE — WOUND CARE
Discharge Instructions for  Walt Yates  [address] [place] [address]  [address], [address]  Phone [phone]   Fax [phone]      NAME:  Edie Osler OF BIRTH:  1950  MEDICAL RECORD NUMBER:  [id number]  DATE:  12/14/2022    Return Appointment:   1 week with Jen Sexton DPM  Friday 12/16 & Wednesday 12/21 with clinician    Instructions: Left foot- great toe & 1st metatarsal head:  Cleanse with normal saline. Soak with Vashe wound solution for at least 15 seconds before dressing application. Acticoat Flex 3: cut top approximate size of wound, apply to wound bed. Cover with ABD & gauze. Do not get wound wet in shower, pool or tub. May purchase cast cover at local pharmacy to keep dry in shower. Apply 2-layer compression wrap. Have changed on Friday 12/16/22 at the clinic. Please call with any problems such as sliding or pain/ toe numbness. Please elevate leg when sitting. Increase dietary protein intake to at least 60 grams per day. May use supplements such as Ensure Max & Massimo (samples & coupons sent with patient). Should you experience increased redness, swelling, pain, foul odor, size of wound(s), or have a temperature over 101 degrees please contact the [address] [place] [address] Road at [phone] or if after hours contact your primary care physician or go to the hospital emergency department. PLEASE NOTE: IF YOU ARE UNABLE TO OBTAIN WOUND SUPPLIES, CONTINUE TO USE THE SUPPLIES YOU HAVE AVAILABLE UNTIL YOU ARE ABLE TO REACH US. IT IS MOST IMPORTANT TO KEEP THE WOUND COVERED AT ALL TIMES.     Electronically signed Loli Sharpe RN on 12/14/2022 at 9:15 AM

## 2022-12-14 NOTE — WOUND CARE
Multilayer Compression Wrap   (Not Unna) Below the Knee    NAME:  Ger Salgado OF BIRTH:  1950  MEDICAL RECORD NUMBER:  [id number]  DATE:  12/14/2022    Multilayer compression wrap: Removed old Multilayer wrap if indicated and wash leg with mild soap/water. Applied moisturizing agent to dry skin as needed. Applied primary and secondary dressing as ordered. Applied multilayered dressing below the knee to left lower leg. Instructed patient/caregiver not to remove dressing and to keep it clean and dry. Instructed patient/caregiver on complications to report to provider, such as pain, numbness in toes, heavy drainage, and slippage of dressing. Instructed patient on purpose of compression dressing and on activity and exercise recommendations.       Electronically signed by Gifty Begum RN on 12/14/2022 at 10:03 AM

## 2022-12-14 NOTE — FLOWSHEET NOTE
Pre & post debridement     12/14/22 0902   Left Leg Edema Point of Measurement   Leg circumference 40.5 cm   Ankle circumference 24 cm   Foot circumference 27 cm   LLE Neurovascular Assessment   Capillary Refill Less than/Equal to 3 seconds   Color Appropriate for Ethnicity   Temperature Warm   LLE Sensation  Decreased   L Pedal Pulse +3   Wound 12/14/22 Toe (Comment  which one) Left left great toe   Date First Assessed/Time First Assessed: 12/14/22 0901   Present on Hospital Admission: Yes  Wound Approximate Age at First Assessment (Weeks): 48 weeks  Location: (c) Toe (Comment  which one)  Wound Location Orientation: Left  Wound Description (Comm. .. Wound Image     Wound Etiology Other   Dressing Status   (neuropathic)   Wound Cleansed Soap and water;Cleansed with saline; Vashe   Offloading for Diabetic Foot Ulcers Offloading ordered   Wound Length (cm) 0.3 cm   Wound Width (cm) 1.4 cm   Wound Depth (cm) 0.1 cm   Wound Surface Area (cm^2) 0.42 cm^2   Wound Volume (cm^3) 0.042 cm^3   Post-Procedure Length (cm) 1.2 cm   Post-Procedure Width (cm) 1.5 cm   Post-Procedure Depth (cm) 0.1 cm   Post-Procedure Surface Area (cm^2) 1.8 cm^2   Post-Procedure Volume (cm^3) 0.18 cm^3   Wound Assessment Pink/red;Eschar dry   Drainage Amount Scant   Drainage Description Serous   Odor None   Leidy-wound Assessment Hyperkeratosis (callous)   Margins Defined edges   Wound Thickness Description not for Pressure Injury Full thickness   Wound 12/14/22 Left;Plantar 1st metatarsal head   Date First Assessed/Time First Assessed: 12/14/22 0906   Present on Hospital Admission: Yes  Wound Approximate Age at First Assessment (Weeks): 12 weeks  Primary Wound Type: Neuropathic  Wound Location Orientation: Left;Plantar  Wound Description (Com. .. Wound Image    Wound Etiology Other   Dressing Status   (neuropathic)   Wound Cleansed Cleansed with saline; Soap and water;Vashe   Offloading for Diabetic Foot Ulcers Offloading ordered   Wound Length (cm) 2 cm   Wound Width (cm) 2 cm   Wound Depth (cm) 0 cm   Wound Surface Area (cm^2) 4 cm^2   Wound Volume (cm^3) 0 cm^3   Wound Assessment Eschar dry   Drainage Amount None   Odor None   Leidy-wound Assessment Hyperkeratosis (callous)   Margins Attached edges

## 2022-12-14 NOTE — PROGRESS NOTES
[address], [address] RECORD NUMBER:  [id number]  AGE: 67 y.o. GENDER: male  : 1950  EPISODE DATE:  2022    Subjective:     Chief Complaint   Patient presents with    Wound Check     left foot- great toe & 1st metatarsal head        Henrik Race is a 67 y.o. Black / [place] American male who presents with a wound to the left plantar distal hallux. He has been well-known to me at my private office and has been transferred here for more aggressive care. Patient has undergone a P[employer] applications in the past.  He is also currently under the care of a doctor in [facility] for GERD related symptoms. He states that since his last evaluation there they are taking a \"wait-and-see approach\". Patient states that he has not been dressing the wound for the last several days trying to let it dry out. He continues in his Hoka shoes with orthotics and added modifications for offloading. He does not have diabetes mellitus. HISTORY of PRESENT ILLNESS HPI    Nature: Painless  Location: Left hallux  Duration: Summer 2022  Onset: Patient states it started as callusing  Course: Stable  Aggravating/Alleviating: Worsened with pressure, improved with compression and rest  Treatment: Acticoat    Ulcer Identification:  Ulcer Type: pressure    Contributing Factors: chronic pressure    Wound: Pressure stage III        PAST MEDICAL HISTORY    Past Medical History:   Diagnosis Date    Idiopathic chronic venous hypertension of left lower extremity with ulcer and inflammation ([address] 2022    Peptic ulcer disease     Pressure injury of left foot, stage 3 ([address] 3/9/2022    Ulcer of left heel, with fat layer exposed ([address] 2022        PAST SURGICAL HISTORY    Past Surgical History:   Procedure Laterality Date    HERNIA REPAIR      hiatal hernia repair       FAMILY HISTORY    History reviewed.  No pertinent family history. SOCIAL HISTORY    Social History     Tobacco Use    Smoking status: Never    Smokeless tobacco: Former   Substance Use Topics    Alcohol use: Yes       ALLERGIES    No Known Allergies    MEDICATIONS    Current Outpatient Medications on File Prior to Encounter   Medication Sig Dispense Refill    furosemide (LASIX) 20 MG tablet Take 20 mg by mouth daily as needed      metoclopramide (REGLAN) 10 MG tablet Take 10 mg by mouth 2 times daily      Multiple Vitamin (MVI, CELEBRATE, CHEWABLE TABLET) Take 1 tablet by mouth daily      celecoxib (CELEBREX) 50 MG capsule TAKE 1 CAPSULE BY MOUTH TWICE DAILY      acamprosate (CAMPRAL) 333 MG tablet Take 666 mg by mouth 3 times daily (Patient not taking: Reported on 12/14/2022)       No current facility-administered medications on file prior to encounter. REVIEW OF SYSTEMS    Pertinent items are noted in HPI. Objective:     /85   Pulse 71   Temp 98.4 °F (36.9 °C) (Oral)   Ht 6' 2\" (1.88 m)   Wt 175 lb (79.4 kg)   BMI 22.47 kg/m²     Wt Readings from Last 3 Encounters:   12/14/22 175 lb (79.4 kg)   06/15/22 170 lb (77.1 kg)   06/07/22 167 lb (75.8 kg)       PHYSICAL EXAM    General: well developed, well nourished, pleasant , NAD. Hygiene good  Psych: cooperative. Pleasant. No anxiety or depression. Normal mood and affect. HEENT: Normocephalic, atraumatic. EOMI. Conjunctiva clear. No scleral icterus. Neck: Normal range of motion. No masses. Chest: Good air entry bilaterally. Respirations nonlabored  Cardio: Normal heart sounds,no rubs, murmurs or gallops  Abdomen: Soft, nontender, nondistended, normoactive bowel sounds    Vascular: DP and PT pulses are palpable at 2/4 bilateral. Skin temperature is uniform from proximal to distal bilateral. Hair growth is absent bilateral. No erythema, edema, heat is appreciated bilateral. No evidence of cellulitis. Derm: Nails 1-5 bilateral are thickened, discolored and with subungual debris.  No paronychial infections are noted. Skin is atrophic and xerotic. No subcutaneous masses or hyperpigmented lesions are present. Neuro: Epicritic sensation is present bilateral. Protective sensation is present with 5.07 SWMF testing to all 10 sites bilateral. Muscle tone and bulk is symmetric bilateral.  Msk: Muscle strength is 5/5 for all prime movers of the foot bilateral.  Rigid contracture at the hallux IPJ and lesser digits PIPJ left foot. Plantarflexed first ray left foot. Full thickness ulceration is noted to the plantar distal left hallux. Hyperkeratotic rim with fibrogranular base. No erythema, edema, purulence or odor. Soft end feel with no bone exposed or palpable. No undermining or sinus track is noted. No fluctuance with palpation. Wound 12/14/22 Toe (Comment  which one) Left left great toe (Active)   Wound Image   12/14/22 0902   Wound Etiology Other 12/14/22 0902   Wound Cleansed Soap and water;Cleansed with saline; Vashe 12/14/22 0902   Offloading for Diabetic Foot Ulcers Offloading ordered 12/14/22 0902   Wound Length (cm) 0.3 cm 12/14/22 0902   Wound Width (cm) 1.4 cm 12/14/22 0902   Wound Depth (cm) 0.1 cm 12/14/22 0902   Wound Surface Area (cm^2) 0.42 cm^2 12/14/22 0902   Wound Volume (cm^3) 0.042 cm^3 12/14/22 0902   Wound Assessment Pink/red;Eschar dry 12/14/22 0902   Drainage Amount Scant 12/14/22 0902   Drainage Description Serous 12/14/22 0902   Odor None 12/14/22 0902   Leidy-wound Assessment Hyperkeratosis (callous) 12/14/22 0902   Margins Defined edges 12/14/22 0902   Wound Thickness Description not for Pressure Injury Full thickness 12/14/22 0902   Number of days: 0       Wound 12/14/22 Left;Plantar 1st metatarsal head (Active)   Wound Image   12/14/22 0902   Wound Etiology Other 12/14/22 0902   Wound Cleansed Cleansed with saline; Soap and water;Vashe 12/14/22 0902   Offloading for Diabetic Foot Ulcers Offloading ordered 12/14/22 0902   Wound Length (cm) 2 cm 12/14/22 0902   Wound Width (cm) 2 cm 12/14/22 0902   Wound Depth (cm) 0.1 cm 12/14/22 0902   Wound Surface Area (cm^2) 4 cm^2 12/14/22 0902   Wound Volume (cm^3) 0.4 cm^3 12/14/22 0902   Wound Assessment Eschar dry 12/14/22 0902   Drainage Amount None 12/14/22 0902   Odor None 12/14/22 0902   Leidy-wound Assessment Hyperkeratosis (callous) 12/14/22 0902   Margins Attached edges 12/14/22 0902   Number of days: 0          DATA REVIEW:  No results found for this or any previous visit (from the past 336 hour(s)). Debridement Wound Care         Procedure Note  Indications:  Based on my examination of this patient's wound(s)/ulcer(s) today, debridement is required to promote healing and evaluate the wound base. Performed by: Cadence Holcomb DPM    Consent obtained: Yes    Time out taken: Yes    Debridement: Selective    Using #15 blade scalpel the wound(s)/ulcer(s) was/were sharply debrided down through and including the removal of    Fibrin/ Exudate/ Debris/ Biofilm     Devitalized Tissue Debrided: Callus    Pre Debridement Measurements:  Are located in the Wound/Ulcer Documentation Flow Sheet    Wound/Ulcer: Plantar left hallux    Post Debridement Measurements:  Wound/Ulcer Descriptions are Pre Debridement except measurements: see flowsheet    Percent of Wound(s)/Ulcer(s) Debrided: 100%    Total Surface Area Debrided: 0.5 sq cm     Estimated Blood Loss:  Minimal    Hemostasis Achieved: Pressure    Procedural Pain: 0 / 10     Post Procedural Pain: 0 / 10     Response to treatment: well tolerated by patient    Assessment and Plan:     Problem List Items Addressed This Visit    Pressure ulcer stage III      Patient examined and evaluated. Educated patient and barriers to healing. Patient is aware that surgical intervention in the form of straightening the hallux is recommended however we are trying to heal the wound first to prevent risk of infection and loss of the toe.   He has responded well in the past to compression therapy and we will begin that here. Pressure reduction is paramount due to location of the wound. Will control this through the use of his Hoka shoes which applied more pressure to the posterior aspect of the foot and his customized orthotics. If this proves to be not enough offloading we will switch to a forefoot offloading wedge shoe. Infection control is required to prevent loss of limb/life - maintain dressing coverage at all times, do not soak or get wet in the shower, wash hands before and after dressing changes. Monitor for erythema, edema, odor, and thick yellow drainage and contact the center immediately if noted. Proper nutrition to support skin growth includes increased protein, vitamins and amino acids - animal based foods and Massimo recommended. Moisture management to prevent maceration and dryness - keep wound covered at all times outside dressing care, do not \"air out\" or soak. Dressing will consist of Acticoat changed at time of compression dressing change. Edema control provided by use of compression therapy. Patient will follow up in 2 days for compression therapy change and in 1 week with me      Patient instructed on the following: Follow all wound dressing instructions. Do not get dressing or wound wet. May shower if wound can be effectively kept dry. Cast covers may be purchased at [facility] and [facility]. Should you experience increased redness, swelling, pain, foul odor, size of wound(s), or have a temperature over 101 degrees please contact the [address] [place] [address] Road at [phone] or if after hours contact your primary care physician or go to the hospital emergency department. No orders of the defined types were placed in this encounter. Treatment Note please see attached Discharge Instructions    Written patient dismissal instructions given to patient or POA.         Electronically signed by Cadence Holcomb DPM on 12/14/2022 at 9:35 AM

## 2022-12-16 ENCOUNTER — HOSPITAL ENCOUNTER (OUTPATIENT)
Dept: WOUND CARE | Age: 72
Discharge: HOME | End: 2022-12-16
Payer: MEDICARE

## 2022-12-16 VITALS
RESPIRATION RATE: 18 BRPM | DIASTOLIC BLOOD PRESSURE: 92 MMHG | WEIGHT: 175 LBS | BODY MASS INDEX: 22.46 KG/M2 | HEIGHT: 74 IN | TEMPERATURE: 98.3 F | HEART RATE: 85 BPM | SYSTOLIC BLOOD PRESSURE: 160 MMHG

## 2022-12-16 DIAGNOSIS — L89.893 PRESSURE INJURY OF LEFT FOOT, STAGE 3 (HCC): Primary | ICD-10-CM

## 2022-12-16 PROCEDURE — 29581 APPL MULTLAYER CMPRN SYS LEG: CPT

## 2022-12-16 RX ORDER — BACITRACIN, NEOMYCIN, POLYMYXIN B 400; 3.5; 5 [USP'U]/G; MG/G; [USP'U]/G
OINTMENT TOPICAL ONCE
OUTPATIENT
Start: 2022-12-16 | End: 2022-12-16

## 2022-12-16 RX ORDER — BACITRACIN ZINC AND POLYMYXIN B SULFATE 500; 1000 [USP'U]/G; [USP'U]/G
OINTMENT TOPICAL ONCE
OUTPATIENT
Start: 2022-12-16 | End: 2022-12-16

## 2022-12-16 RX ORDER — GENTAMICIN SULFATE 1 MG/G
OINTMENT TOPICAL ONCE
OUTPATIENT
Start: 2022-12-16 | End: 2022-12-16

## 2022-12-16 RX ORDER — LIDOCAINE HYDROCHLORIDE 20 MG/ML
JELLY TOPICAL ONCE
OUTPATIENT
Start: 2022-12-16 | End: 2022-12-16

## 2022-12-16 RX ORDER — BETAMETHASONE DIPROPIONATE 0.05 %
OINTMENT (GRAM) TOPICAL ONCE
OUTPATIENT
Start: 2022-12-16 | End: 2022-12-16

## 2022-12-16 RX ORDER — CLOBETASOL PROPIONATE 0.5 MG/G
OINTMENT TOPICAL ONCE
OUTPATIENT
Start: 2022-12-16 | End: 2022-12-16

## 2022-12-16 RX ORDER — LIDOCAINE 50 MG/G
OINTMENT TOPICAL ONCE
OUTPATIENT
Start: 2022-12-16 | End: 2022-12-16

## 2022-12-16 RX ORDER — LIDOCAINE HYDROCHLORIDE 40 MG/ML
SOLUTION TOPICAL ONCE
OUTPATIENT
Start: 2022-12-16 | End: 2022-12-16

## 2022-12-16 RX ORDER — LIDOCAINE 40 MG/G
CREAM TOPICAL ONCE
OUTPATIENT
Start: 2022-12-16 | End: 2022-12-16

## 2022-12-16 RX ORDER — GINSENG 100 MG
CAPSULE ORAL ONCE
OUTPATIENT
Start: 2022-12-16 | End: 2022-12-16

## 2022-12-16 NOTE — FLOWSHEET NOTE
12/16/22 0853   Wound 12/14/22 Toe (Comment  which one) Left left great toe   Date First Assessed/Time First Assessed: 12/14/22 0901   Present on Hospital Admission: Yes  Wound Approximate Age at First Assessment (Weeks): 48 weeks  Location: (c) Toe (Comment  which one)  Wound Location Orientation: Left  Wound Description (Comm. .. Wound Image    Wound Etiology Other  (neuropathic)   Wound Cleansed Vashe   Offloading for Diabetic Foot Ulcers Offloading ordered   Wound Length (cm) 0.4 cm   Wound Width (cm) 0.6 cm   Wound Depth (cm) 0.2 cm   Wound Surface Area (cm^2) 0.24 cm^2   Change in Wound Size % (l*w) 42.86   Wound Volume (cm^3) 0.048 cm^3   Wound Healing % -14   Wound Assessment Pink/red   Drainage Amount Small   Drainage Description Serosanguinous   Odor None   Leidy-wound Assessment Hyperkeratosis (callous)   Wound Thickness Description not for Pressure Injury Full thickness   Wound 12/14/22 Left;Plantar 1st metatarsal head   Date First Assessed/Time First Assessed: 12/14/22 0906   Present on Hospital Admission: Yes  Wound Approximate Age at First Assessment (Weeks): 12 weeks  Primary Wound Type: Neuropathic  Wound Location Orientation: Left;Plantar  Wound Description (Com. ..    Wound Image    Wound Etiology Other  (neuropathic)   Wound Cleansed Vashe   Offloading for Diabetic Foot Ulcers Offloading ordered   Wound Length (cm) 0.1 cm   Wound Width (cm) 0.1 cm   Wound Depth (cm) 0.5 cm   Wound Surface Area (cm^2) 0.01 cm^2   Change in Wound Size % (l*w) 99.75   Wound Volume (cm^3) 0.005 cm^3   Drainage Amount Moderate   Drainage Description Serosanguinous   Odor None   Leidy-wound Assessment Hyperkeratosis (callous)   Wound Thickness Description not for Pressure Injury Full thickness

## 2022-12-16 NOTE — WOUND CARE
Multilayer Compression Wrap   (Not Unna) Below the Knee    NAME:  Josue Linder OF BIRTH:  1950  MEDICAL RECORD NUMBER:  [id number]  DATE:  12/16/2022    Multilayer compression wrap: Removed old Multilayer wrap if indicated and wash leg with mild soap/water. Applied moisturizing agent to dry skin as needed. Applied primary and secondary dressing as ordered. Applied multilayered dressing below the knee to left lower leg. Instructed patient/caregiver not to remove dressing and to keep it clean and dry. Instructed patient/caregiver on complications to report to provider, such as pain, numbness in toes, heavy drainage, and slippage of dressing. Instructed patient on purpose of compression dressing and on activity and exercise recommendations.       Electronically signed by Nafisa Burden RN on 12/16/2022 at 8:55 AM

## 2022-12-21 ENCOUNTER — HOSPITAL ENCOUNTER (OUTPATIENT)
Dept: WOUND CARE | Age: 72
Discharge: HOME | End: 2022-12-21
Payer: MEDICARE

## 2022-12-21 VITALS — SYSTOLIC BLOOD PRESSURE: 152 MMHG | DIASTOLIC BLOOD PRESSURE: 89 MMHG | HEART RATE: 73 BPM | TEMPERATURE: 98.6 F

## 2022-12-21 DIAGNOSIS — L89.893 PRESSURE INJURY OF LEFT FOOT, STAGE 3 (HCC): Primary | ICD-10-CM

## 2022-12-21 PROCEDURE — 97597 DBRDMT OPN WND 1ST 20 CM/<: CPT

## 2022-12-21 RX ORDER — LIDOCAINE HYDROCHLORIDE 20 MG/ML
JELLY TOPICAL ONCE
OUTPATIENT
Start: 2022-12-21 | End: 2022-12-21

## 2022-12-21 RX ORDER — LIDOCAINE 40 MG/G
CREAM TOPICAL ONCE
OUTPATIENT
Start: 2022-12-21 | End: 2022-12-21

## 2022-12-21 RX ORDER — LIDOCAINE HYDROCHLORIDE 40 MG/ML
SOLUTION TOPICAL ONCE
OUTPATIENT
Start: 2022-12-21 | End: 2022-12-21

## 2022-12-21 RX ORDER — GINSENG 100 MG
CAPSULE ORAL ONCE
OUTPATIENT
Start: 2022-12-21 | End: 2022-12-21

## 2022-12-21 RX ORDER — BETAMETHASONE DIPROPIONATE 0.05 %
OINTMENT (GRAM) TOPICAL ONCE
OUTPATIENT
Start: 2022-12-21 | End: 2022-12-21

## 2022-12-21 RX ORDER — BACITRACIN, NEOMYCIN, POLYMYXIN B 400; 3.5; 5 [USP'U]/G; MG/G; [USP'U]/G
OINTMENT TOPICAL ONCE
OUTPATIENT
Start: 2022-12-21 | End: 2022-12-21

## 2022-12-21 RX ORDER — CLOBETASOL PROPIONATE 0.5 MG/G
OINTMENT TOPICAL ONCE
OUTPATIENT
Start: 2022-12-21 | End: 2022-12-21

## 2022-12-21 RX ORDER — BACITRACIN ZINC AND POLYMYXIN B SULFATE 500; 1000 [USP'U]/G; [USP'U]/G
OINTMENT TOPICAL ONCE
OUTPATIENT
Start: 2022-12-21 | End: 2022-12-21

## 2022-12-21 RX ORDER — GENTAMICIN SULFATE 1 MG/G
OINTMENT TOPICAL ONCE
OUTPATIENT
Start: 2022-12-21 | End: 2022-12-21

## 2022-12-21 RX ORDER — LIDOCAINE 50 MG/G
OINTMENT TOPICAL ONCE
OUTPATIENT
Start: 2022-12-21 | End: 2022-12-21

## 2022-12-21 NOTE — PROGRESS NOTES
[address], [address] RECORD NUMBER:  [id number]  AGE: 67 y.o. GENDER: male  : 1950  EPISODE DATE:  2022    Subjective:     Chief Complaint   Patient presents with    Wound Check     left foot- great toe & 1st metatarsal head        Boston Fink is a 67 y.o. Black / [place] American male who presents with a wound to the left plantar distal hallux. He tolerated the compression dressing well reporting on Friday of last week for dressing change and today with me. He notes no pain. He notes no breakthrough drainage. HISTORY of PRESENT ILLNESS HPI    Nature: Painless  Location: Left hallux  Duration: Summer 2022  Onset: Patient states it started as callusing  Course: Improved  Aggravating/Alleviating: Worsened with pressure, improved with compression and rest  Treatment: Acticoat, compression    Ulcer Identification:  Ulcer Type: pressure    Contributing Factors: chronic pressure    Wound: Pressure stage III        PAST MEDICAL HISTORY    Past Medical History:   Diagnosis Date    Idiopathic chronic venous hypertension of left lower extremity with ulcer and inflammation ([address] 2022    Peptic ulcer disease     Pressure injury of left foot, stage 3 ([address] 3/9/2022    Ulcer of left heel, with fat layer exposed ([address] 2022        PAST SURGICAL HISTORY    Past Surgical History:   Procedure Laterality Date    HERNIA REPAIR      hiatal hernia repair       FAMILY HISTORY    History reviewed. No pertinent family history. SOCIAL HISTORY    Social History     Tobacco Use    Smoking status: Never    Smokeless tobacco: Former   Substance Use Topics    Alcohol use:  Yes       ALLERGIES    No Known Allergies    MEDICATIONS    Current Outpatient Medications on File Prior to Encounter   Medication Sig Dispense Refill    Multiple Vitamin (MVI, CELEBRATE, CHEWABLE TABLET) Take 1 tablet by mouth daily      celecoxib (CELEBREX) 50 MG capsule TAKE 1 CAPSULE BY MOUTH TWICE DAILY      furosemide (LASIX) 20 MG tablet Take 20 mg by mouth daily as needed      metoclopramide (REGLAN) 10 MG tablet Take 10 mg by mouth 2 times daily      acamprosate (CAMPRAL) 333 MG tablet Take 666 mg by mouth 3 times daily (Patient not taking: Reported on 12/14/2022)       No current facility-administered medications on file prior to encounter. REVIEW OF SYSTEMS    Pertinent items are noted in HPI. Objective:     BP (!) 152/89   Pulse 73   Temp 98.6 °F (37 °C) (Temporal)     Wt Readings from Last 3 Encounters:   12/16/22 175 lb (79.4 kg)   12/14/22 175 lb (79.4 kg)   06/15/22 170 lb (77.1 kg)       PHYSICAL EXAM    General: well developed, well nourished, pleasant , NAD. Hygiene good  Psych: cooperative. Pleasant. No anxiety or depression. Normal mood and affect. HEENT: Normocephalic, atraumatic. EOMI. Conjunctiva clear. No scleral icterus. Neck: Normal range of motion. No masses. Chest: Good air entry bilaterally. Respirations nonlabored  Cardio: Normal heart sounds,no rubs, murmurs or gallops  Abdomen: Soft, nontender, nondistended, normoactive bowel sounds    Vascular: DP and PT pulses are palpable at 2/4 bilateral. Skin temperature is uniform from proximal to distal bilateral. Hair growth is absent bilateral. No erythema, edema, heat is appreciated bilateral. No evidence of cellulitis. Derm: Nails 1-5 bilateral are thickened, discolored and with subungual debris. No paronychial infections are noted. Skin is atrophic and xerotic. No subcutaneous masses or hyperpigmented lesions are present. Neuro: Epicritic sensation is present bilateral. Protective sensation is present with 5.07 SWMF testing to all 10 sites bilateral. Muscle tone and bulk is symmetric bilateral.  Msk: Muscle strength is 5/5 for all prime movers of the foot bilateral.  Rigid contracture at the hallux IPJ and lesser digits PIPJ left foot. Plantarflexed first ray left foot. Full thickness ulceration is noted to the plantar distal left hallux. Hyperkeratotic rim with fibrogranular base. No erythema, edema, purulence or odor. Soft end feel with no bone exposed or palpable. No undermining or sinus track is noted. No fluctuance with palpation. Smaller in size    Wound 12/14/22 Toe (Comment  which one) Left left great toe (Active)   Wound Image   12/21/22 0823   Wound Etiology Other 12/16/22 0853   Dressing Status Clean;Dry; Intact 12/21/22 0823   Wound Cleansed Cleansed with saline; Soap and water;Vashe 12/21/22 0823   Offloading for Diabetic Foot Ulcers Offloading ordered 12/21/22 0823   Wound Length (cm) 0.2 cm 12/21/22 0823   Wound Width (cm) 0.4 cm 12/21/22 0823   Wound Depth (cm) 0.1 cm 12/21/22 0823   Wound Surface Area (cm^2) 0.08 cm^2 12/21/22 0823   Change in Wound Size % (l*w) 80.95 12/21/22 0823   Wound Volume (cm^3) 0.008 cm^3 12/21/22 0823   Wound Healing % 81 12/21/22 0823   Post-Procedure Length (cm) 1.2 cm 12/14/22 0902   Post-Procedure Width (cm) 1.5 cm 12/14/22 0902   Post-Procedure Depth (cm) 0.1 cm 12/14/22 0902   Post-Procedure Surface Area (cm^2) 1.8 cm^2 12/14/22 0902   Post-Procedure Volume (cm^3) 0.18 cm^3 12/14/22 0902   Wound Assessment Granulation tissue 12/21/22 0823   Drainage Amount Scant 12/21/22 0823   Drainage Description Serosanguinous 12/21/22 0823   Odor None 12/21/22 0823   Leidy-wound Assessment Hyperkeratosis (callous) 12/21/22 0823   Margins Attached edges; Defined edges 12/21/22 0823   Wound Thickness Description not for Pressure Injury Full thickness 12/21/22 0823   Number of days: 7       Wound 12/14/22 Left;Plantar 1st metatarsal head (Active)   Wound Image   12/21/22 0823   Wound Etiology Other 12/21/22 0823   Dressing Status Clean;Dry; Intact 12/21/22 0823   Wound Cleansed Cleansed with saline; Soap and water;Vashe 12/21/22 0823   Dressing/Treatment Foam 12/21/22 0823   Offloading for Diabetic Foot Ulcers Offloading ordered 12/21/22 0823   Wound Length (cm) 0 cm 12/21/22 0823   Wound Width (cm) 0 cm 12/21/22 0823   Wound Depth (cm) 0 cm 12/21/22 0823   Wound Surface Area (cm^2) 0 cm^2 12/21/22 0823   Change in Wound Size % (l*w) 100 12/21/22 0823   Wound Volume (cm^3) 0 cm^3 12/21/22 0823   Wound Assessment Dry 12/21/22 0823   Drainage Amount None 12/21/22 0823   Drainage Description Serosanguinous 12/16/22 0853   Odor None 12/16/22 0853   Leidy-wound Assessment Hyperkeratosis (callous) 12/21/22 0823   Margins Attached edges 12/14/22 0902   Wound Thickness Description not for Pressure Injury Full thickness 12/16/22 0853   Number of days: 7          DATA REVIEW:  No results found for this or any previous visit (from the past 336 hour(s)). Debridement Wound Care         Procedure Note  Indications:  Based on my examination of this patient's wound(s)/ulcer(s) today, debridement is required to promote healing and evaluate the wound base. Performed by: Anny Silva DPM    Consent obtained: Yes    Time out taken: Yes    Debridement: Selective    Using #15 blade scalpel the wound(s)/ulcer(s) was/were sharply debrided down through and including the removal of    Fibrin/ Exudate/ Debris/ Biofilm     Devitalized Tissue Debrided: Callus    Pre Debridement Measurements:  Are located in the Wound/Ulcer Documentation Flow Sheet    Wound/Ulcer: Plantar left hallux    Post Debridement Measurements:  Wound/Ulcer Descriptions are Pre Debridement except measurements: see flowsheet    Percent of Wound(s)/Ulcer(s) Debrided: 100%    Total Surface Area Debrided: 1 sq cm     Estimated Blood Loss:  Minimal    Hemostasis Achieved: Pressure    Procedural Pain: 0 / 10     Post Procedural Pain: 0 / 10     Response to treatment: well tolerated by patient    Assessment and Plan:     Problem List Items Addressed This Visit    Pressure ulcer stage III      Patient examined and evaluated. Educated patient and barriers to healing.   Patient is aware that surgical intervention in the form of straightening the hallux is recommended however we are trying to heal the wound first to prevent risk of infection and loss of the toe. He has responded well to the compression therapy and we will continue this. Pressure reduction is paramount due to location of the wound. Will control this through the use of his [employer] shoes which applied more pressure to the posterior aspect of the foot and his customized orthotics. If this proves to be not enough offloading we will switch to a forefoot offloading wedge shoe. Infection control is required to prevent loss of limb/life - maintain dressing coverage at all times, do not soak or get wet in the shower, wash hands before and after dressing changes. Monitor for erythema, edema, odor, and thick yellow drainage and contact the center immediately if noted. Proper nutrition to support skin growth includes increased protein, vitamins and amino acids - animal based foods and Massimo recommended. Moisture management to prevent maceration and dryness - keep wound covered at all times outside dressing care, do not \"air out\" or soak. Dressing will consist of Acticoat changed at time of compression dressing change. Edema control provided by use of compression therapy. Patient will follow up in 2 days for compression therapy change and in 1 week with me      Patient instructed on the following: Follow all wound dressing instructions. Do not get dressing or wound wet. May shower if wound can be effectively kept dry. Cast covers may be purchased at [facility] and [facility]. Should you experience increased redness, swelling, pain, foul odor, size of wound(s), or have a temperature over 101 degrees [facility] at [phone] or if after hours contact your primary care physician or go to the hospital emergency department.     Orders Placed This Encounter   Procedures    Initiate Outpatient Wound Care Protocol     Cleanse wound with saline    If wound contains bioburden or contamination cleanse with wound cleanser or antimicrobial solution     For normal periwound tissue without irritation nor maceration, apply topical skin protectant    For periwound tissue with irritation and/or maceration, apply zinc based product, topical steroid cream/ointment, or equivalent     For wounds with dry firm black eschar and/or without exudate, apply betadine and leave open to air      For wounds with scant/small to no exudate or drainage, apply wound gel, hydrocolloid, polymer, or equivalent and cover with secondary dressing/foam      For wounds with moderate/large exudate or drainage, apply alginate, hydrofiber, polymer, or equivalent and cover with secondary dressing/foam    For wounds with nonviable tissue requiring removal, apply chemical or mechanical debrider and cover with secondary dressing/foam    For wounds with tunneling, dead space, or cavity, fill or pack with strip/gauze/kerlex to fit and cover with secondary dressing/foam    For wounds with adequate granulation or epithelization, apply wound gel, hydrocolloid, polymer, collagen, or transparent film, and cover secondary dry dressing/foam    For wounds that need additional secondary dressing to help pad or control additional drainage/exudates, add foam, absorbent pad or hydrocolloid    For wounds with suspected or known infection, apply antimicrobial mesh and/or antimicrobial alginate/hydrofiber, or antimicrobial solution moistened gauze/kerlex, or equivalent and cover with secondary dressing/foam    Compression Management needed for edema control, apply multilayer compression or tubular garment or equivalent    Offloading Management needed for pressure relief, apply offloading shoe/boot or equivalent     Standing Status:   Standing     Number of Occurrences:   1         Treatment Note please see attached Discharge Instructions    Written patient dismissal instructions given to patient or POA.         Electronically signed by Vinicius Alonzo DPM on 12/21/2022 at 9:14 AM

## 2022-12-21 NOTE — WOUND CARE
Discharge Instructions for  Walt Yates  [address] [facility] [address]  [address] [facility], [address]  Phone [phone]   Fax [phone]      NAME:  Renetta Leary OF BIRTH:  1950  MEDICAL RECORD NUMBER:  [id number]  DATE:  12/21/2022    Return Appointment:   2 weeks with Eliseo Campa DPM & clinician dressing change Friday 12/23 & Wednesday 12/28    Instructions: Left foot- great toe & 1st metatarsal head:  Cleanse with normal saline. Soak with Vashe wound solution for at least 15 seconds before dressing application. Acticoat Flex 3: cut top approximate size of wound, apply to wound bed. Cover with ABD & gauze or bordered foam.      Do not get wound wet in shower, pool or tub. May purchase cast cover at local pharmacy to keep dry in shower. Apply 2-layer compression wrap. Please call with any problems such as sliding or pain/ toe numbness. Please elevate leg when sitting. Increase dietary protein intake to at least 60 grams per day. May use supplements such as Ensure Max & Massimo (samples & coupons sent with patient at last visit). Should you experience increased redness, swelling, pain, foul odor, size of wound(s), or have a temperature over 101 degrees please contact the [address] [place] [address] Road at [phone] or if after hours contact your primary care physician or go to the hospital emergency department. PLEASE NOTE: IF YOU ARE UNABLE TO OBTAIN WOUND SUPPLIES, CONTINUE TO USE THE SUPPLIES YOU HAVE AVAILABLE UNTIL YOU ARE ABLE TO REACH US. IT IS MOST IMPORTANT TO KEEP THE WOUND COVERED AT ALL TIMES.     Electronically signed Asif Rachel RN on 12/21/2022 at 9:11 AM

## 2022-12-21 NOTE — DISCHARGE INSTRUCTIONS
Discharge Instructions for  Walt Yates  [address] [place] [address]  [place] [address], [address]  Phone [phone]   Fax [phone]        NAME:  Margy Rhodes OF BIRTH:  1950  MEDICAL RECORD NUMBER:  [id number]  DATE:  12/21/2022     Return Appointment:   2 weeks with Anyi Negron DPM & clinician dressing change Friday 12/23 & Wednesday 12/28     Instructions: Left foot- great toe & 1st metatarsal head:  Cleanse with normal saline. Soak with Vashe wound solution for at least 15 seconds before dressing application. Acticoat Flex 3: cut top approximate size of wound, apply to wound bed. Cover with ABD & gauze or bordered foam.      Do not get wound wet in shower, pool or tub. May purchase cast cover at local pharmacy to keep dry in shower. Apply 2-layer compression wrap. Please call with any problems such as sliding or pain/ toe numbness. Please elevate leg when sitting. Increase dietary protein intake to at least 60 grams per day. May use supplements such as Ensure Max & Massimo (samples & coupons sent with patient at last visit). Should you experience increased redness, swelling, pain, foul odor, size of wound(s), or have a temperature over 101 degrees please contact the [address] [place] [address] Road at [phone] or if after hours contact your primary care physician or go to the hospital emergency department. PLEASE NOTE: IF YOU ARE UNABLE TO OBTAIN WOUND SUPPLIES, CONTINUE TO USE THE SUPPLIES YOU HAVE AVAILABLE UNTIL YOU ARE ABLE TO REACH US. IT IS MOST IMPORTANT TO KEEP THE WOUND COVERED AT ALL TIMES.      Electronically signed Neida Craven RN on 12/21/2022 at 9:11 AM

## 2022-12-21 NOTE — WOUND CARE
Multilayer Compression Wrap   (Not Unna) Below the Knee    NAME:  Hawa Postal OF BIRTH:  1950  MEDICAL RECORD NUMBER:  [id number]  DATE:  12/21/2022    Multilayer compression wrap: Removed old Multilayer wrap if indicated and wash leg with mild soap/water. Applied moisturizing agent to dry skin as needed. Applied primary and secondary dressing as ordered. Applied multilayered dressing below the knee to left lower leg. Instructed patient/caregiver not to remove dressing and to keep it clean and dry. Instructed patient/caregiver on complications to report to provider, such as pain, numbness in toes, heavy drainage, and slippage of dressing. Instructed patient on purpose of compression dressing and on activity and exercise recommendations.       Electronically signed by Rachana Lynn RN on 12/21/2022 at 9:08 AM

## 2022-12-21 NOTE — FLOWSHEET NOTE
12/21/22 0823   Left Leg Edema Point of Measurement   Leg circumference 39.5 cm   Ankle circumference 23.5 cm   Foot circumference 27 cm   Compression Therapy 2 layer compression wrap   LLE Neurovascular Assessment   Capillary Refill Less than/Equal to 3 seconds   Color Appropriate for Ethnicity   Temperature Warm   LLE Sensation  Decreased   L Pedal Pulse +3   Wound 12/14/22 Toe (Comment  which one) Left left great toe   Date First Assessed/Time First Assessed: 12/14/22 0901   Present on Hospital Admission: Yes  Wound Approximate Age at First Assessment (Weeks): 48 weeks  Location: (c) Toe (Comment  which one)  Wound Location Orientation: Left  Wound Description (Comm. .. Wound Image    Wound Etiology   (neuropathic)   Dressing Status Clean;Dry; Intact   Wound Cleansed Cleansed with saline; Soap and water;Vashe   Dressing/Treatment   (Acticoat)   Offloading for Diabetic Foot Ulcers Offloading ordered   Wound Length (cm) 0.2 cm   Wound Width (cm) 0.4 cm   Wound Depth (cm) 0.1 cm   Wound Surface Area (cm^2) 0.08 cm^2   Change in Wound Size % (l*w) 80.95   Wound Volume (cm^3) 0.008 cm^3   Wound Healing % 81   Wound Assessment Granulation tissue   Drainage Amount Scant   Drainage Description Serosanguinous   Odor None   Leidy-wound Assessment Hyperkeratosis (callous)   Margins Attached edges; Defined edges   Wound Thickness Description not for Pressure Injury Full thickness   Wound 12/14/22 Left;Plantar 1st metatarsal head   Date First Assessed/Time First Assessed: 12/14/22 0906   Present on Hospital Admission: Yes  Wound Approximate Age at First Assessment (Weeks): 12 weeks  Primary Wound Type: Neuropathic  Wound Location Orientation: Left;Plantar  Wound Description (Com. .. Wound Image    Wound Etiology Other  (neuropathic)   Dressing Status Clean;Dry; Intact   Wound Cleansed Cleansed with saline; Soap and water;Vashe   Dressing/Treatment Foam   Offloading for Diabetic Foot Ulcers Offloading ordered   Wound Length (cm) 0 cm   Wound Width (cm) 0 cm   Wound Depth (cm) 0 cm   Wound Surface Area (cm^2) 0 cm^2   Change in Wound Size % (l*w) 100   Wound Volume (cm^3) 0 cm^3   Wound Assessment Dry   Drainage Amount None   Leidy-wound Assessment Hyperkeratosis (callous)   Pain Assessment   Pain Assessment None - Denies Pain

## 2022-12-21 NOTE — FLOWSHEET NOTE
Post debridement     12/21/22 0820   Wound 12/14/22 Toe (Comment  which one) Left left great toe   Date First Assessed/Time First Assessed: 12/14/22 0901   Present on Hospital Admission: Yes  Wound Approximate Age at First Assessment (Weeks): 48 weeks  Location: (c) Toe (Comment  which one)  Wound Location Orientation: Left  Wound Description (Comm. .. Wound Image     Wound Etiology   (neuropathic)   Dressing Status Clean;Dry; Intact   Wound Cleansed Cleansed with saline; Soap and water;Vashe   Dressing/Treatment   (Acticoat)   Offloading for Diabetic Foot Ulcers Offloading ordered   Wound Length (cm) 0.2 cm   Wound Width (cm) 0.4 cm   Wound Depth (cm) 0.1 cm   Wound Surface Area (cm^2) 0.08 cm^2   Change in Wound Size % (l*w) 80.95   Wound Volume (cm^3) 0.008 cm^3   Wound Healing % 81   Post-Procedure Length (cm) 0.2 cm   Post-Procedure Width (cm) 0.4 cm   Post-Procedure Depth (cm) 0.1 cm   Post-Procedure Surface Area (cm^2) 0.08 cm^2   Post-Procedure Volume (cm^3) 0.008 cm^3   Wound Assessment Granulation tissue   Drainage Amount Scant   Drainage Description Serosanguinous   Odor None   Leidy-wound Assessment Hyperkeratosis (callous)   Margins Attached edges; Defined edges   Wound Thickness Description not for Pressure Injury Full thickness   Wound 12/14/22 Left;Plantar 1st metatarsal head   Date First Assessed/Time First Assessed: 12/14/22 0906   Present on Hospital Admission: Yes  Wound Approximate Age at First Assessment (Weeks): 12 weeks  Primary Wound Type: Neuropathic  Wound Location Orientation: Left;Plantar  Wound Description (Com. .. Wound Image    Wound Etiology Other  (neuropathic)   Dressing Status Clean;Dry; Intact   Wound Cleansed Cleansed with saline; Soap and water;Vashe   Dressing/Treatment Foam   Offloading for Diabetic Foot Ulcers Offloading ordered   Wound Length (cm) 0 cm   Wound Width (cm) 0 cm   Wound Depth (cm) 0 cm   Wound Surface Area (cm^2) 0 cm^2   Change in Wound Size % (l*w) 100   Wound Volume (cm^3) 0 cm^3   Wound Assessment Dry   Drainage Amount None   Leidy-wound Assessment Hyperkeratosis (callous)

## 2022-12-23 ENCOUNTER — HOSPITAL ENCOUNTER (OUTPATIENT)
Dept: WOUND CARE | Age: 72
Discharge: HOME | End: 2022-12-23
Payer: MEDICARE

## 2022-12-23 VITALS
WEIGHT: 175 LBS | DIASTOLIC BLOOD PRESSURE: 95 MMHG | SYSTOLIC BLOOD PRESSURE: 140 MMHG | HEIGHT: 74 IN | BODY MASS INDEX: 22.46 KG/M2 | HEART RATE: 68 BPM

## 2022-12-23 DIAGNOSIS — L89.893 PRESSURE INJURY OF LEFT FOOT, STAGE 3 (HCC): Primary | ICD-10-CM

## 2022-12-23 PROCEDURE — 29581 APPL MULTLAYER CMPRN SYS LEG: CPT

## 2022-12-23 RX ORDER — LIDOCAINE HYDROCHLORIDE 20 MG/ML
JELLY TOPICAL ONCE
OUTPATIENT
Start: 2022-12-23 | End: 2022-12-23

## 2022-12-23 RX ORDER — BACITRACIN ZINC AND POLYMYXIN B SULFATE 500; 1000 [USP'U]/G; [USP'U]/G
OINTMENT TOPICAL ONCE
OUTPATIENT
Start: 2022-12-23 | End: 2022-12-23

## 2022-12-23 RX ORDER — BACITRACIN, NEOMYCIN, POLYMYXIN B 400; 3.5; 5 [USP'U]/G; MG/G; [USP'U]/G
OINTMENT TOPICAL ONCE
OUTPATIENT
Start: 2022-12-23 | End: 2022-12-23

## 2022-12-23 RX ORDER — LIDOCAINE HYDROCHLORIDE 40 MG/ML
SOLUTION TOPICAL ONCE
OUTPATIENT
Start: 2022-12-23 | End: 2022-12-23

## 2022-12-23 RX ORDER — GINSENG 100 MG
CAPSULE ORAL ONCE
OUTPATIENT
Start: 2022-12-23 | End: 2022-12-23

## 2022-12-23 RX ORDER — BETAMETHASONE DIPROPIONATE 0.05 %
OINTMENT (GRAM) TOPICAL ONCE
OUTPATIENT
Start: 2022-12-23 | End: 2022-12-23

## 2022-12-23 RX ORDER — LIDOCAINE 50 MG/G
OINTMENT TOPICAL ONCE
OUTPATIENT
Start: 2022-12-23 | End: 2022-12-23

## 2022-12-23 RX ORDER — CLOBETASOL PROPIONATE 0.5 MG/G
OINTMENT TOPICAL ONCE
OUTPATIENT
Start: 2022-12-23 | End: 2022-12-23

## 2022-12-23 RX ORDER — LIDOCAINE 40 MG/G
CREAM TOPICAL ONCE
OUTPATIENT
Start: 2022-12-23 | End: 2022-12-23

## 2022-12-23 RX ORDER — GENTAMICIN SULFATE 1 MG/G
OINTMENT TOPICAL ONCE
OUTPATIENT
Start: 2022-12-23 | End: 2022-12-23

## 2022-12-23 NOTE — FLOWSHEET NOTE
12/23/22 0856   Left Leg Edema Point of Measurement   Leg circumference 39 cm   Ankle circumference 24 cm   Foot circumference 27 cm   Compression Therapy 2 layer compression wrap   LLE Neurovascular Assessment   Capillary Refill Less than/Equal to 3 seconds   Color Appropriate for Ethnicity   Temperature Warm   L Pedal Pulse +3   Wound 12/14/22 Toe (Comment  which one) Left Wound #1  great toe   Date First Assessed/Time First Assessed: 12/14/22 0901   Present on Hospital Admission: Yes  Wound Approximate Age at First Assessment (Weeks): 48 weeks  Location: (c) Toe (Comment  which one)  Wound Location Orientation: Left  Wound Description (Comm. .. Wound Image    Wound Etiology   (neuropathic)   Dressing Status Clean;Dry; Intact   Wound Cleansed Cleansed with saline; Soap and water;Vashe   Dressing/Treatment Foam  (Acticoat)   Offloading for Diabetic Foot Ulcers Offloading ordered   Wound Length (cm) 0.2 cm   Wound Width (cm) 0.4 cm   Wound Depth (cm) 0.1 cm   Wound Surface Area (cm^2) 0.08 cm^2   Change in Wound Size % (l*w) 80.95   Wound Volume (cm^3) 0.008 cm^3   Wound Healing % 81   Wound Assessment Granulation tissue   Drainage Amount Scant   Drainage Description Serosanguinous   Odor None   Leidy-wound Assessment Hyperkeratosis (callous)   Margins Attached edges   Wound Thickness Description not for Pressure Injury Full thickness   Pain Assessment   Pain Assessment None - Denies Pain

## 2022-12-23 NOTE — WOUND CARE
Multilayer Compression Wrap   (Not Unna) Below the Knee    NAME:  Kiana Delgado OF BIRTH:  1950  MEDICAL RECORD NUMBER:  [id number]  DATE:  12/23/2022    Multilayer compression wrap: Removed old Multilayer wrap if indicated and wash leg with mild soap/water. Applied moisturizing agent to dry skin as needed. Applied primary and secondary dressing as ordered. Applied multilayered dressing below the knee to left lower leg. Instructed patient/caregiver not to remove dressing and to keep it clean and dry. Instructed patient/caregiver on complications to report to provider, such as pain, numbness in toes, heavy drainage, and slippage of dressing. Instructed patient on purpose of compression dressing and on activity and exercise recommendations.       Electronically signed by Reese Aragon RN on 12/23/2022 at 8:29 AM

## 2022-12-28 ENCOUNTER — HOSPITAL ENCOUNTER (OUTPATIENT)
Dept: WOUND CARE | Age: 72
Discharge: HOME | End: 2022-12-28
Payer: MEDICARE

## 2022-12-28 VITALS
HEIGHT: 74 IN | BODY MASS INDEX: 22.46 KG/M2 | HEART RATE: 69 BPM | DIASTOLIC BLOOD PRESSURE: 87 MMHG | WEIGHT: 175 LBS | SYSTOLIC BLOOD PRESSURE: 157 MMHG

## 2022-12-28 PROCEDURE — 29581 APPL MULTLAYER CMPRN SYS LEG: CPT

## 2022-12-28 NOTE — FLOWSHEET NOTE
12/28/22 0838   Wound 12/14/22 Toe (Comment  which one) Left Wound #1  great toe   Date First Assessed/Time First Assessed: 12/14/22 0901   Present on Hospital Admission: Yes  Wound Approximate Age at First Assessment (Weeks): 48 weeks  Location: (c) Toe (Comment  which one)  Wound Location Orientation: Left  Wound Description (Comm. .. Wound Image    Wound Etiology   (neuropathic)   Dressing Status Clean;Dry; Intact   Wound Cleansed Vashe;Cleansed with saline   Dressing/Treatment Foam   Offloading for Diabetic Foot Ulcers Offloading ordered   Wound Length (cm) 0.1 cm   Wound Width (cm) 0.4 cm   Wound Depth (cm) 0.1 cm   Wound Surface Area (cm^2) 0.04 cm^2   Change in Wound Size % (l*w) 90.48   Wound Volume (cm^3) 0.004 cm^3   Wound Healing % 90   Wound Assessment Pink/red   Drainage Amount Scant   Drainage Description Serosanguinous   Odor None   Leidy-wound Assessment Hyperkeratosis (callous)   Wound Thickness Description not for Pressure Injury Full thickness

## 2022-12-28 NOTE — WOUND CARE
Multilayer Compression Wrap   (Not Unna) Below the Knee    NAME:  Guera Benitez OF BIRTH:  1950  MEDICAL RECORD NUMBER:  [id number]  DATE:  12/28/2022    Multilayer compression wrap: Removed old Multilayer wrap if indicated and wash leg with mild soap/water. Applied moisturizing agent to dry skin as needed. Applied primary and secondary dressing as ordered. Applied multilayered dressing below the knee to left lower leg. Instructed patient/caregiver not to remove dressing and to keep it clean and dry. Instructed patient/caregiver on complications to report to provider, such as pain, numbness in toes, heavy drainage, and slippage of dressing. Instructed patient on purpose of compression dressing and on activity and exercise recommendations.       Electronically signed by Ana Lilia Diallo RN on 12/28/2022 at 8:42 AM

## 2022-12-30 ENCOUNTER — HOSPITAL ENCOUNTER (OUTPATIENT)
Dept: WOUND CARE | Age: 72
Discharge: HOME | End: 2022-12-30
Payer: MEDICARE

## 2022-12-30 VITALS
DIASTOLIC BLOOD PRESSURE: 85 MMHG | BODY MASS INDEX: 22.46 KG/M2 | HEART RATE: 71 BPM | WEIGHT: 175 LBS | TEMPERATURE: 98.2 F | SYSTOLIC BLOOD PRESSURE: 146 MMHG | HEIGHT: 74 IN

## 2022-12-30 DIAGNOSIS — L89.893 PRESSURE INJURY OF LEFT FOOT, STAGE 3 (HCC): Primary | ICD-10-CM

## 2022-12-30 PROCEDURE — 29581 APPL MULTLAYER CMPRN SYS LEG: CPT

## 2022-12-30 RX ORDER — LIDOCAINE HYDROCHLORIDE 20 MG/ML
JELLY TOPICAL ONCE
OUTPATIENT
Start: 2022-12-30 | End: 2022-12-30

## 2022-12-30 RX ORDER — GENTAMICIN SULFATE 1 MG/G
OINTMENT TOPICAL ONCE
OUTPATIENT
Start: 2022-12-30 | End: 2022-12-30

## 2022-12-30 RX ORDER — LIDOCAINE 50 MG/G
OINTMENT TOPICAL ONCE
OUTPATIENT
Start: 2022-12-30 | End: 2022-12-30

## 2022-12-30 RX ORDER — BACITRACIN, NEOMYCIN, POLYMYXIN B 400; 3.5; 5 [USP'U]/G; MG/G; [USP'U]/G
OINTMENT TOPICAL ONCE
OUTPATIENT
Start: 2022-12-30 | End: 2022-12-30

## 2022-12-30 RX ORDER — LIDOCAINE 40 MG/G
CREAM TOPICAL ONCE
OUTPATIENT
Start: 2022-12-30 | End: 2022-12-30

## 2022-12-30 RX ORDER — GINSENG 100 MG
CAPSULE ORAL ONCE
OUTPATIENT
Start: 2022-12-30 | End: 2022-12-30

## 2022-12-30 RX ORDER — CLOBETASOL PROPIONATE 0.5 MG/G
OINTMENT TOPICAL ONCE
OUTPATIENT
Start: 2022-12-30 | End: 2022-12-30

## 2022-12-30 RX ORDER — BACITRACIN ZINC AND POLYMYXIN B SULFATE 500; 1000 [USP'U]/G; [USP'U]/G
OINTMENT TOPICAL ONCE
OUTPATIENT
Start: 2022-12-30 | End: 2022-12-30

## 2022-12-30 RX ORDER — BETAMETHASONE DIPROPIONATE 0.05 %
OINTMENT (GRAM) TOPICAL ONCE
OUTPATIENT
Start: 2022-12-30 | End: 2022-12-30

## 2022-12-30 RX ORDER — LIDOCAINE HYDROCHLORIDE 40 MG/ML
SOLUTION TOPICAL ONCE
OUTPATIENT
Start: 2022-12-30 | End: 2022-12-30

## 2022-12-30 NOTE — WOUND CARE
Multilayer Compression Wrap   (Not Unna) Below the Knee    NAME:  Alina Campbell OF BIRTH:  1950  MEDICAL RECORD NUMBER:  [id number]  DATE:  12/30/2022    Multilayer compression wrap: Removed old Multilayer wrap if indicated and wash leg with mild soap/water. Applied moisturizing agent to dry skin as needed. Applied primary and secondary dressing as ordered. Applied multilayered dressing below the knee to left lower leg. Instructed patient/caregiver not to remove dressing and to keep it clean and dry. Instructed patient/caregiver on complications to report to provider, such as pain, numbness in toes, heavy drainage, and slippage of dressing. Instructed patient on purpose of compression dressing and on activity and exercise recommendations.       Electronically signed by Tonny Jacob RN on 12/30/2022 at 8:45 AM

## 2022-12-30 NOTE — FLOWSHEET NOTE
12/30/22 0841   Left Leg Edema Point of Measurement   Leg circumference 39.5 cm   Ankle circumference 24 cm   Foot circumference 26 cm   Compression Therapy 2 layer compression wrap   LLE Neurovascular Assessment   Capillary Refill Less than/Equal to 3 seconds   Color Appropriate for Ethnicity   Temperature Warm   LLE Sensation  Decreased   L Pedal Pulse +3   Wound 12/14/22 Toe (Comment  which one) Left Wound #1  great toe   Date First Assessed/Time First Assessed: 12/14/22 0901   Present on Hospital Admission: Yes  Wound Approximate Age at First Assessment (Weeks): 48 weeks  Location: (c) Toe (Comment  which one)  Wound Location Orientation: Left  Wound Description (Comm. .. Wound Image    Wound Etiology   (neuropathic)   Dressing Status Clean;Dry; Intact   Wound Cleansed Cleansed with saline; Soap and water;Vashe   Dressing/Treatment   (Acticoat, foam, rolled gauze)   Offloading for Diabetic Foot Ulcers Offloading ordered;Diabetic shoes/inserts   Wound Length (cm) 0.1 cm   Wound Width (cm) 0.4 cm   Wound Depth (cm) 0.1 cm   Wound Surface Area (cm^2) 0.04 cm^2   Change in Wound Size % (l*w) 90.48   Wound Volume (cm^3) 0.004 cm^3   Wound Healing % 90   Wound Assessment Pink/red   Drainage Amount None   Odor None   Leidy-wound Assessment Hyperkeratosis (callous)   Margins Attached edges   Wound Thickness Description not for Pressure Injury Full thickness   Pain Assessment   Pain Assessment None - Denies Pain

## 2023-01-04 ENCOUNTER — HOSPITAL ENCOUNTER (OUTPATIENT)
Dept: WOUND CARE | Age: 73
Discharge: HOME | End: 2023-01-04
Payer: MEDICARE

## 2023-01-04 VITALS
WEIGHT: 173 LBS | HEART RATE: 79 BPM | RESPIRATION RATE: 18 BRPM | HEIGHT: 74 IN | BODY MASS INDEX: 22.2 KG/M2 | SYSTOLIC BLOOD PRESSURE: 138 MMHG | DIASTOLIC BLOOD PRESSURE: 68 MMHG | TEMPERATURE: 98.2 F | OXYGEN SATURATION: 99 %

## 2023-01-04 DIAGNOSIS — L89.893 PRESSURE INJURY OF LEFT FOOT, STAGE 3 (HCC): Primary | ICD-10-CM

## 2023-01-04 PROCEDURE — 97597 DBRDMT OPN WND 1ST 20 CM/<: CPT

## 2023-01-04 RX ORDER — LIDOCAINE 50 MG/G
OINTMENT TOPICAL ONCE
OUTPATIENT
Start: 2023-01-04 | End: 2023-01-04

## 2023-01-04 RX ORDER — CLOBETASOL PROPIONATE 0.5 MG/G
OINTMENT TOPICAL ONCE
OUTPATIENT
Start: 2023-01-04 | End: 2023-01-04

## 2023-01-04 RX ORDER — BACITRACIN ZINC AND POLYMYXIN B SULFATE 500; 1000 [USP'U]/G; [USP'U]/G
OINTMENT TOPICAL ONCE
OUTPATIENT
Start: 2023-01-04 | End: 2023-01-04

## 2023-01-04 RX ORDER — LIDOCAINE HYDROCHLORIDE 20 MG/ML
JELLY TOPICAL ONCE
OUTPATIENT
Start: 2023-01-04 | End: 2023-01-04

## 2023-01-04 RX ORDER — LIDOCAINE HYDROCHLORIDE 40 MG/ML
SOLUTION TOPICAL ONCE
OUTPATIENT
Start: 2023-01-04 | End: 2023-01-04

## 2023-01-04 RX ORDER — GINSENG 100 MG
CAPSULE ORAL ONCE
OUTPATIENT
Start: 2023-01-04 | End: 2023-01-04

## 2023-01-04 RX ORDER — BACITRACIN, NEOMYCIN, POLYMYXIN B 400; 3.5; 5 [USP'U]/G; MG/G; [USP'U]/G
OINTMENT TOPICAL ONCE
OUTPATIENT
Start: 2023-01-04 | End: 2023-01-04

## 2023-01-04 RX ORDER — BETAMETHASONE DIPROPIONATE 0.05 %
OINTMENT (GRAM) TOPICAL ONCE
OUTPATIENT
Start: 2023-01-04 | End: 2023-01-04

## 2023-01-04 RX ORDER — GENTAMICIN SULFATE 1 MG/G
OINTMENT TOPICAL ONCE
OUTPATIENT
Start: 2023-01-04 | End: 2023-01-04

## 2023-01-04 RX ORDER — LIDOCAINE 40 MG/G
CREAM TOPICAL ONCE
OUTPATIENT
Start: 2023-01-04 | End: 2023-01-04

## 2023-01-04 NOTE — PROGRESS NOTES
[address] [facility] [address], [address] RECORD NUMBER:  [id number]  AGE: 67 y.o. GENDER: male  : 1950  EPISODE DATE:  2023    Subjective:     Chief Complaint   Patient presents with    Wound Check     Left great toe wound        Venus Phillip is a 67 y.o. Black / [place] American male who presents with a wound to the left plantar distal hallux. He tolerated the compression dressing well. He notes no pain. He notes no breakthrough drainage. HISTORY of PRESENT ILLNESS HPI    Nature: Painless  Location: Left hallux  Duration: Summer 2022  Onset: Patient states it started as callusing  Course: Improved  Aggravating/Alleviating: Worsened with pressure, improved with compression and rest  Treatment: Acticoat, compression    Ulcer Identification:  Ulcer Type: pressure    Contributing Factors: chronic pressure    Wound: Pressure stage III        PAST MEDICAL HISTORY    Past Medical History:   Diagnosis Date    Idiopathic chronic venous hypertension of left lower extremity with ulcer and inflammation ([address] 2022    Peptic ulcer disease     Pressure injury of left foot, stage 3 ([address] 3/9/2022    Ulcer of left heel, with fat layer exposed ([address] 2022        PAST SURGICAL HISTORY    Past Surgical History:   Procedure Laterality Date    HERNIA REPAIR      hiatal hernia repair       FAMILY HISTORY    History reviewed. No pertinent family history. SOCIAL HISTORY    Social History     Tobacco Use    Smoking status: Never    Smokeless tobacco: Former   Substance Use Topics    Alcohol use:  Yes       ALLERGIES    No Known Allergies    MEDICATIONS    Current Outpatient Medications on File Prior to Encounter   Medication Sig Dispense Refill    Multiple Vitamin (MVI, CELEBRATE, CHEWABLE TABLET) Take 1 tablet by mouth daily      celecoxib (CELEBREX) 50 MG capsule TAKE 1 CAPSULE BY MOUTH TWICE DAILY furosemide (LASIX) 20 MG tablet Take 20 mg by mouth daily as needed      metoclopramide (REGLAN) 10 MG tablet Take 10 mg by mouth 2 times daily      acamprosate (CAMPRAL) 333 MG tablet Take 666 mg by mouth 3 times daily (Patient not taking: Reported on 12/14/2022)       No current facility-administered medications on file prior to encounter. REVIEW OF SYSTEMS    Pertinent items are noted in HPI. Objective:     /68   Pulse 79   Temp 98.2 °F (36.8 °C) (Oral)   Resp 18   Ht 6' 2\" (1.88 m)   Wt 173 lb (78.5 kg)   SpO2 99%   BMI 22.21 kg/m²     Wt Readings from Last 3 Encounters:   01/04/23 173 lb (78.5 kg)   12/30/22 175 lb (79.4 kg)   12/28/22 175 lb (79.4 kg)       PHYSICAL EXAM    General: well developed, well nourished, pleasant , NAD. Hygiene good  Psych: cooperative. Pleasant. No anxiety or depression. Normal mood and affect. HEENT: Normocephalic, atraumatic. EOMI. Conjunctiva clear. No scleral icterus. Neck: Normal range of motion. No masses. Chest: Good air entry bilaterally. Respirations nonlabored  Cardio: Normal heart sounds,no rubs, murmurs or gallops  Abdomen: Soft, nontender, nondistended, normoactive bowel sounds    Vascular: DP and PT pulses are palpable at 2/4 bilateral. Skin temperature is uniform from proximal to distal bilateral. Hair growth is absent bilateral. No erythema, edema, heat is appreciated bilateral. No evidence of cellulitis. Derm: Nails 1-5 bilateral are thickened, discolored and with subungual debris. No paronychial infections are noted. Skin is atrophic and xerotic. No subcutaneous masses or hyperpigmented lesions are present. Neuro: Epicritic sensation is present bilateral. Protective sensation is present with 5.07 SWMF testing to all 10 sites bilateral. Muscle tone and bulk is symmetric bilateral.  Msk: Muscle strength is 5/5 for all prime movers of the foot bilateral.  Rigid contracture at the hallux IPJ and lesser digits PIPJ left foot.   Plantarflexed first ray left foot. Full thickness ulceration is noted to the plantar distal left hallux. Hyperkeratotic rim with granular base under layer of slough no erythema, edema, purulence or odor. Soft end feel with no bone exposed or palpable. No undermining or sinus track is noted. No fluctuance with palpation. Wound 12/14/22 Toe (Comment  which one) Left Wound #1  great toe (Active)   Wound Image    01/04/23 0909   Wound Etiology Other 01/04/23 0909   Dressing Status Old drainage noted 01/04/23 0909   Wound Cleansed Cleansed with saline 01/04/23 0909   Dressing/Treatment Silver dressing 01/04/23 0909   Offloading for Diabetic Foot Ulcers Diabetic shoes/inserts 01/04/23 0909   Wound Length (cm) 0.1 cm 01/04/23 0909   Wound Width (cm) 0.6 cm 01/04/23 0909   Wound Depth (cm) 0.5 cm 01/04/23 0909   Wound Surface Area (cm^2) 0.06 cm^2 01/04/23 0909   Change in Wound Size % (l*w) 85.71 01/04/23 0909   Wound Volume (cm^3) 0.03 cm^3 01/04/23 0909   Wound Healing % 29 01/04/23 0909   Post-Procedure Length (cm) 0.3 cm 01/04/23 0909   Post-Procedure Width (cm) 0.6 cm 01/04/23 0909   Post-Procedure Depth (cm) 0.3 cm 01/04/23 0909   Post-Procedure Surface Area (cm^2) 0.18 cm^2 01/04/23 0909   Post-Procedure Volume (cm^3) 0.054 cm^3 01/04/23 0909   Wound Assessment Pink/red 01/04/23 0909   Drainage Amount Small 01/04/23 0909   Drainage Description Serosanguinous 01/04/23 0909   Odor None 01/04/23 0909   Leidy-wound Assessment Hyperkeratosis (callous) 01/04/23 0909   Margins Attached edges 12/30/22 0841   Wound Thickness Description not for Pressure Injury Full thickness 01/04/23 0909   Number of days: 21          DATA REVIEW:  No results found for this or any previous visit (from the past 336 hour(s)). Debridement Wound Care         Procedure Note  Indications:  Based on my examination of this patient's wound(s)/ulcer(s) today, debridement is required to promote healing and evaluate the wound base.     Performed by: Lily Velez RA Maldonado    Consent obtained: Yes    Time out taken: Yes    Debridement: Selective    Using #15 blade scalpel the wound(s)/ulcer(s) was/were sharply debrided down through and including the removal of    Fibrin/ Exudate/ Debris/ Biofilm     Devitalized Tissue Debrided: Callus    Pre Debridement Measurements:  Are located in the Wound/Ulcer Documentation Flow Sheet    Wound/Ulcer: Plantar left hallux    Post Debridement Measurements:  Wound/Ulcer Descriptions are Pre Debridement except measurements: see flowsheet    Percent of Wound(s)/Ulcer(s) Debrided: 100%    Total Surface Area Debrided: 1 sq cm     Estimated Blood Loss:  Minimal    Hemostasis Achieved: Pressure    Procedural Pain: 0 / 10     Post Procedural Pain: 0 / 10     Response to treatment: well tolerated by patient    Assessment and Plan:     Problem List Items Addressed This Visit    Pressure ulcer stage III      Patient examined and evaluated. Educated patient and barriers to healing. Patient is aware that surgical intervention in the form of straightening the hallux is recommended however we are trying to heal the wound first to prevent risk of infection and loss of the toe. We will transfer him out of the compression therapy and into compression stockings. Pressure reduction is paramount due to location of the wound. Will control this through the use of his Hoka shoes which applied more pressure to the posterior aspect of the foot and his customized orthotics. If this proves to be not enough offloading we will switch to a forefoot offloading wedge shoe. Infection control is required to prevent loss of limb/life - maintain dressing coverage at all times, do not soak or get wet in the shower, wash hands before and after dressing changes. Monitor for erythema, edema, odor, and thick yellow drainage and contact the center immediately if noted.     Proper nutrition to support skin growth includes increased protein, vitamins and amino acids - animal based foods and Massimo recommended. Moisture management to prevent maceration and dryness - keep wound covered at all times outside dressing care, do not \"air out\" or soak. Dressing will consist of collagen with Hydrofera Blue ready overlying change 3 times a week. Edema control provided by use of compression stockings. Patient will follow up in 1 week. Patient instructed on the following: Follow all wound dressing instructions. Do not get dressing or wound wet. May shower if wound can be effectively kept dry. Cast covers may be purchased at [facility] and [facility]. Should you experience increased redness, swelling, pain, foul odor, size of wound(s), or have a temperature over 101 degrees please contact the [address] [place] [address] Road at [phone] or if after hours contact your primary care physician or go to the hospital emergency department. No orders of the defined types were placed in this encounter. Treatment Note please see attached Discharge Instructions    Written patient dismissal instructions given to patient or POA.         Electronically signed by Tushar Navas DPM on 1/4/2023 at 9:54 AM

## 2023-01-04 NOTE — WOUND CARE
Discharge Instructions for  Walt Yates  [address] [facility] [address]  [address], [address]  Phone [phone]   Fax [phone]      NAME:  Henyr Shine OF BIRTH:  1950  MEDICAL RECORD NUMBER:  [id number]  DATE:  1/4/2023    Return Appointment:   1 week with Con Cruz DPM      Instructions: Left great toe:  Cleanse with normal saline. Soak with Vashe wound solution for at least 15 seconds before dressing application. Apply collagen (Puracol Plus). Cut product to approximately size and apply to wound bed. Cover collagen with piece of Hydrofera Ready,  cut slightly larger than collagen. Cover with ABD and wrap with rolled gauze. Do not get wound wet in shower, pool or tub. May purchase cast cover at local pharmacy to keep dry in shower. Wear 2-layer tubigrip today; wear compression stocking to left lower leg at all times during the day; may remove at night if desired. Please elevate leg when sitting. Increase dietary protein intake to at least 60 grams per day. May use supplements such as Ensure Max & Massimo        Should you experience increased redness, swelling, pain, foul odor, size of wound(s), or have a temperature over 101 degrees please contact the [address] [place] [address] Road at [phone] or if after hours contact your primary care physician or go to the hospital emergency department. PLEASE NOTE: IF YOU ARE UNABLE TO OBTAIN WOUND SUPPLIES, CONTINUE TO USE THE SUPPLIES YOU HAVE AVAILABLE UNTIL YOU ARE ABLE TO REACH US. IT IS MOST IMPORTANT TO KEEP THE WOUND COVERED AT ALL TIMES.     Electronically signed Eva Suazo PT, [facility] on 1/4/2023 at 9:26 AM

## 2023-01-04 NOTE — FLOWSHEET NOTE
01/04/23 0909   Left Leg Edema Point of Measurement   Leg circumference 39 cm   Ankle circumference 23.5 cm   Foot circumference 25 cm   Compression Therapy 2 layer compression wrap   Wound 12/14/22 Toe (Comment  which one) Left Wound #1  great toe   Date First Assessed/Time First Assessed: 12/14/22 0901   Present on Hospital Admission: Yes  Wound Approximate Age at First Assessment (Weeks): 48 weeks  Location: (c) Toe (Comment  which one)  Wound Location Orientation: Left  Wound Description (Comm. ..    Wound Image     Wound Etiology Other  (Neuropathic)   Dressing Status Old drainage noted   Wound Cleansed Cleansed with saline   Dressing/Treatment Silver dressing  (Acticoat, foam, rolled gauze, 2-layer compression wrap)   Offloading for Diabetic Foot Ulcers Diabetic shoes/inserts   Wound Length (cm) 0.1 cm   Wound Width (cm) 0.6 cm   Wound Depth (cm) 0.5 cm   Wound Surface Area (cm^2) 0.06 cm^2   Change in Wound Size % (l*w) 85.71   Wound Volume (cm^3) 0.03 cm^3   Wound Healing % 29   Post-Procedure Length (cm) 0.3 cm   Post-Procedure Width (cm) 0.6 cm   Post-Procedure Depth (cm) 0.3 cm   Post-Procedure Surface Area (cm^2) 0.18 cm^2   Post-Procedure Volume (cm^3) 0.054 cm^3   Wound Assessment Pink/red   Drainage Amount Small   Drainage Description Serosanguinous   Odor None   Leidy-wound Assessment Hyperkeratosis (callous)   Wound Thickness Description not for Pressure Injury Full thickness   Pain Assessment   Pain Assessment None - Denies Pain   Post-debridement photo and measurements as noted above

## 2023-01-04 NOTE — DISCHARGE INSTRUCTIONS
Return Appointment:   1 week with Sandra Hood DPMARGOTH        Instructions: Left great toe:  Cleanse with normal saline. Soak with Vashe wound solution for at least 15 seconds before dressing application. Apply collagen (Puracol Plus). Cut product to approximately size and apply to wound bed. Cover collagen with piece of Hydrofera Ready,  cut slightly larger than collagen. Cover with ABD and wrap with rolled gauze. Do not get wound wet in shower, pool or tub. May purchase cast cover at local pharmacy to keep dry in shower. Wear 2-layer tubigrip today; wear compression stocking to left lower leg at all times during the day; may remove at night if desired. Please elevate leg when sitting. Increase dietary protein intake to at least 60 grams per day.  May use supplements such as Ensure Max & Leotha Abbe

## 2023-01-04 NOTE — FLOWSHEET NOTE
01/04/23 0909   Left Leg Edema Point of Measurement   Leg circumference 39 cm   Ankle circumference 23.5 cm   Foot circumference 25 cm   Compression Therapy 2 layer compression wrap   Wound 12/14/22 Toe (Comment  which one) Left Wound #1  great toe   Date First Assessed/Time First Assessed: 12/14/22 0901   Present on Hospital Admission: Yes  Wound Approximate Age at First Assessment (Weeks): 48 weeks  Location: (c) Toe (Comment  which one)  Wound Location Orientation: Left  Wound Description (Comm. ..    Wound Image    Wound Etiology Other  (Neuropathic)   Dressing Status Old drainage noted   Wound Cleansed Cleansed with saline   Dressing/Treatment Silver dressing  (Acticoat, foam, rolled gauze, 2-layer compression wrap)   Offloading for Diabetic Foot Ulcers Diabetic shoes/inserts   Wound Length (cm) 0.1 cm   Wound Width (cm) 0.6 cm   Wound Depth (cm) 0.5 cm   Wound Surface Area (cm^2) 0.06 cm^2   Change in Wound Size % (l*w) 85.71   Wound Volume (cm^3) 0.03 cm^3   Wound Healing % 29   Wound Assessment Pink/red   Drainage Amount Small   Drainage Description Serosanguinous   Odor None   Leidy-wound Assessment Hyperkeratosis (callous)   Wound Thickness Description not for Pressure Injury Full thickness   Pain Assessment   Pain Assessment None - Denies Pain

## 2023-01-11 ENCOUNTER — HOSPITAL ENCOUNTER (OUTPATIENT)
Dept: WOUND CARE | Age: 73
Discharge: HOME | End: 2023-01-11
Payer: MEDICARE

## 2023-01-11 VITALS
SYSTOLIC BLOOD PRESSURE: 166 MMHG | RESPIRATION RATE: 20 BRPM | BODY MASS INDEX: 22.33 KG/M2 | DIASTOLIC BLOOD PRESSURE: 87 MMHG | WEIGHT: 174 LBS | HEIGHT: 74 IN | HEART RATE: 74 BPM

## 2023-01-11 DIAGNOSIS — L89.893 PRESSURE INJURY OF LEFT FOOT, STAGE 3 (HCC): Primary | ICD-10-CM

## 2023-01-11 PROCEDURE — 99213 OFFICE O/P EST LOW 20 MIN: CPT

## 2023-01-11 RX ORDER — LIDOCAINE HYDROCHLORIDE 20 MG/ML
JELLY TOPICAL ONCE
OUTPATIENT
Start: 2023-01-11 | End: 2023-01-11

## 2023-01-11 RX ORDER — GINSENG 100 MG
CAPSULE ORAL ONCE
OUTPATIENT
Start: 2023-01-11 | End: 2023-01-11

## 2023-01-11 RX ORDER — LIDOCAINE 40 MG/G
CREAM TOPICAL ONCE
OUTPATIENT
Start: 2023-01-11 | End: 2023-01-11

## 2023-01-11 RX ORDER — GENTAMICIN SULFATE 1 MG/G
OINTMENT TOPICAL ONCE
OUTPATIENT
Start: 2023-01-11 | End: 2023-01-11

## 2023-01-11 RX ORDER — BACITRACIN ZINC AND POLYMYXIN B SULFATE 500; 1000 [USP'U]/G; [USP'U]/G
OINTMENT TOPICAL ONCE
OUTPATIENT
Start: 2023-01-11 | End: 2023-01-11

## 2023-01-11 RX ORDER — LIDOCAINE HYDROCHLORIDE 40 MG/ML
SOLUTION TOPICAL ONCE
OUTPATIENT
Start: 2023-01-11 | End: 2023-01-11

## 2023-01-11 RX ORDER — BETAMETHASONE DIPROPIONATE 0.05 %
OINTMENT (GRAM) TOPICAL ONCE
OUTPATIENT
Start: 2023-01-11 | End: 2023-01-11

## 2023-01-11 RX ORDER — BACITRACIN, NEOMYCIN, POLYMYXIN B 400; 3.5; 5 [USP'U]/G; MG/G; [USP'U]/G
OINTMENT TOPICAL ONCE
OUTPATIENT
Start: 2023-01-11 | End: 2023-01-11

## 2023-01-11 RX ORDER — LIDOCAINE 50 MG/G
OINTMENT TOPICAL ONCE
OUTPATIENT
Start: 2023-01-11 | End: 2023-01-11

## 2023-01-11 RX ORDER — CLOBETASOL PROPIONATE 0.5 MG/G
OINTMENT TOPICAL ONCE
OUTPATIENT
Start: 2023-01-11 | End: 2023-01-11

## 2023-01-11 NOTE — PROGRESS NOTES
[address] [place] [address] Drive RECORD NUMBER:  [id number]  AGE: 67 y.o. GENDER: male  : 1950  EPISODE DATE:  2023    Subjective:     Chief Complaint   Patient presents with    Wound Check     Left Great Toe        Payal Ordonez is a 67 y.o. Black / [place] American male who presents with a wound to the left plantar distal hallux. He has worn his compression stocking from morning till night daily as previously instructed. He notes no increase in his edema changing out of the compression wrap. HISTORY of PRESENT ILLNESS HPI    Nature: Painless  Location: Left hallux  Duration: Summer 2022  Onset: Patient states it started as callusing  Course: Improved  Aggravating/Alleviating: Worsened with pressure, improved with compression and rest  Treatment: Collagen with overlying Hydrofera ready    Ulcer Identification:  Ulcer Type: pressure    Contributing Factors: chronic pressure    Wound: Pressure stage III        PAST MEDICAL HISTORY    Past Medical History:   Diagnosis Date    Idiopathic chronic venous hypertension of left lower extremity with ulcer and inflammation ([address] 2022    Peptic ulcer disease     Pressure injury of left foot, stage 3 ([address] 3/9/2022    Ulcer of left heel, with fat layer exposed ([address] 2022        PAST SURGICAL HISTORY    Past Surgical History:   Procedure Laterality Date    HERNIA REPAIR      hiatal hernia repair       FAMILY HISTORY    History reviewed. No pertinent family history. SOCIAL HISTORY    Social History     Tobacco Use    Smoking status: Never    Smokeless tobacco: Former   Substance Use Topics    Alcohol use:  Yes       ALLERGIES    No Known Allergies    MEDICATIONS    Current Outpatient Medications on File Prior to Encounter   Medication Sig Dispense Refill    Multiple Vitamin (MVI, CELEBRATE, CHEWABLE TABLET) Take 1 tablet by mouth daily celecoxib (CELEBREX) 50 MG capsule TAKE 1 CAPSULE BY MOUTH TWICE DAILY      furosemide (LASIX) 20 MG tablet Take 20 mg by mouth daily as needed      metoclopramide (REGLAN) 10 MG tablet Take 10 mg by mouth 2 times daily      acamprosate (CAMPRAL) 333 MG tablet Take 666 mg by mouth 3 times daily (Patient not taking: Reported on 12/14/2022)       No current facility-administered medications on file prior to encounter. REVIEW OF SYSTEMS    Pertinent items are noted in HPI. Objective:     BP (!) 166/87   Pulse 74   Resp 20   Ht 6' 2\" (1.88 m)   Wt 174 lb (78.9 kg)   BMI 22.34 kg/m²     Wt Readings from Last 3 Encounters:   01/11/23 174 lb (78.9 kg)   01/04/23 173 lb (78.5 kg)   12/30/22 175 lb (79.4 kg)       PHYSICAL EXAM    General: well developed, well nourished, pleasant , NAD. Hygiene good  Psych: cooperative. Pleasant. No anxiety or depression. Normal mood and affect. HEENT: Normocephalic, atraumatic. EOMI. Conjunctiva clear. No scleral icterus. Neck: Normal range of motion. No masses. Chest: Good air entry bilaterally. Respirations nonlabored  Cardio: Normal heart sounds,no rubs, murmurs or gallops  Abdomen: Soft, nontender, nondistended, normoactive bowel sounds    Vascular: DP and PT pulses are palpable at 2/4 bilateral. Skin temperature is uniform from proximal to distal bilateral. Hair growth is absent bilateral. No erythema, edema, heat is appreciated bilateral. No evidence of cellulitis. Derm: Nails 1-5 bilateral are thickened, discolored and with subungual debris. No paronychial infections are noted. Skin is atrophic and xerotic. No subcutaneous masses or hyperpigmented lesions are present.    Neuro: Epicritic sensation is present bilateral. Protective sensation is present with 5.07 SWMF testing to all 10 sites bilateral. Muscle tone and bulk is symmetric bilateral.  Msk: Muscle strength is 5/5 for all prime movers of the foot bilateral.  Rigid contracture at the hallux IPJ and lesser digits PIPJ left foot. Plantarflexed first ray left foot. Full thickness ulceration is noted to the plantar distal left hallux. Hyperkeratotic rim with granular base. No erythema, edema, purulence or odor. Soft end feel with no bone exposed or palpable. No undermining or sinus track is noted. No fluctuance with palpation. Wound 12/14/22 Toe (Comment  which one) Left Wound #1  great toe (Active)   Wound Image   01/11/23 0806   Wound Etiology Other 01/11/23 0806   Dressing Status Old drainage noted 01/11/23 0806   Wound Cleansed Cleansed with saline 01/11/23 0806   Dressing/Treatment Collagen 01/11/23 0806   Offloading for Diabetic Foot Ulcers Diabetic shoes/inserts 01/04/23 0909   Wound Length (cm) 0.1 cm 01/11/23 0806   Wound Width (cm) 0.4 cm 01/11/23 0806   Wound Depth (cm) 0.4 cm 01/11/23 0806   Wound Surface Area (cm^2) 0.04 cm^2 01/11/23 0806   Change in Wound Size % (l*w) 90.48 01/11/23 0806   Wound Volume (cm^3) 0.016 cm^3 01/11/23 0806   Wound Healing % 62 01/11/23 0806   Post-Procedure Length (cm) 0.3 cm 01/04/23 0909   Post-Procedure Width (cm) 0.6 cm 01/04/23 0909   Post-Procedure Depth (cm) 0.3 cm 01/04/23 0909   Post-Procedure Surface Area (cm^2) 0.18 cm^2 01/04/23 0909   Post-Procedure Volume (cm^3) 0.054 cm^3 01/04/23 0909   Wound Assessment Pink/red 01/11/23 0806   Drainage Amount Small 01/11/23 0806   Drainage Description Serosanguinous 01/11/23 0806   Odor None 01/11/23 0806   Leidy-wound Assessment Hyperkeratosis (callous) 01/11/23 0806   Margins Attached edges 12/30/22 0841   Wound Thickness Description not for Pressure Injury Full thickness 01/11/23 0806   Number of days: 27          DATA REVIEW:  No results found for this or any previous visit (from the past 336 hour(s)). Assessment and Plan:     Problem List Items Addressed This Visit    Pressure ulcer stage III      Patient examined and evaluated. Educated patient and barriers to healing.   Patient is aware that surgical intervention in the form of straightening the hallux is recommended however we are trying to heal the wound first to prevent risk of infection and loss of the toe. Pressure reduction is paramount due to location of the wound. Will control this through the use of his Hoka shoes which applied more pressure to the posterior aspect of the foot and his customized orthotics. If this proves to be not enough offloading we will switch to a forefoot offloading wedge shoe. Infection control is required to prevent loss of limb/life - maintain dressing coverage at all times, wash hands before and after dressing changes. Monitor for erythema, edema, odor, and thick yellow drainage and contact the center immediately if noted. Proper nutrition to support skin growth includes increased protein, vitamins and amino acids - animal based foods and Massimo recommended. Moisture management to prevent maceration and dryness - keep wound covered at all times outside dressing care, do not \"air out\" or soak. Dressing will consist of Iodosorb applied daily. Patient may shower and wash the toe. He must wash the toe then rinse and then leave the shower immediately afterward. Dry area well before applying his dressing. Edema control provided by use of compression stockings. Patient will follow up in 1 week. Patient instructed on the following: Follow all wound dressing instructions. Do not get dressing or wound wet. May shower if wound can be effectively kept dry. Cast covers may be purchased at [facility] [employer] [facility]. Should you experience increased redness, swelling, pain, foul odor, size of wound(s), or have a temperature over 101 degrees please contact the [address] [place] [address] Road at [phone] or if after hours contact your primary care physician or go to the hospital emergency department. No orders of the defined types were placed in this encounter.         Treatment Note please see attached Discharge Instructions    Written patient dismissal instructions given to patient or POA.         Electronically signed by Noah Stevens DPM on 1/11/2023 at 8:22 AM

## 2023-01-11 NOTE — DISCHARGE INSTRUCTIONS
Discharge Instructions for  Walt Yates  [address] [place] [address], [address] [place] [address] [address]  Phone [phone]   Fax [phone]      NAME:  Adriana Adams OF BIRTH:  1950  MEDICAL RECORD NUMBER:  [id number]  DATE:  @ED@    Return Appointment:   1 week with Ted Voss DPM      Instructions:  Left great toe:  Cleanse with normal saline. Soak with Vashe wound solution for at least 15 seconds before dressing application. Apply Iodosorb- apply thin layer to wound bed, cover with cover dressing, change daily . Cover with Gauze and wrap with rolled gauze. Change Daily     May Shower and Get Wound Wet-Dry Toe and Apply Iodosorb     Wear Compression Stockings to Both Legs-May Remove at Night  Please elevate leg when sitting. Increase dietary protein intake to at least 60 grams per day. May use supplements such as Ensure Max & Massimo          Should you experience increased redness, swelling, pain, foul odor, size of wound(s), or have a temperature over 101 degrees please contact the [address] [place] [address] Road at [phone] or if after hours contact your primary care physician or go to the hospital emergency department. PLEASE NOTE: IF YOU ARE UNABLE TO OBTAIN WOUND SUPPLIES, CONTINUE TO USE THE SUPPLIES YOU HAVE AVAILABLE UNTIL YOU ARE ABLE TO REACH US. IT IS MOST IMPORTANT TO KEEP THE WOUND COVERED AT ALL TIMES.     Electronically signed Bryan Braga RN on 1/11/2023 at 8:22 AM

## 2023-01-11 NOTE — FLOWSHEET NOTE
01/11/23 0806   Wound 12/14/22 Toe (Comment  which one) Left Wound #1  great toe   Date First Assessed/Time First Assessed: 12/14/22 0901   Present on Hospital Admission: Yes  Wound Approximate Age at First Assessment (Weeks): 48 weeks  Location: (c) Toe (Comment  which one)  Wound Location Orientation: Left  Wound Description (Comm...   Wound Image    Wound Etiology Other  (Neuropathic)   Dressing Status Old drainage noted   Wound Cleansed Cleansed with saline   Dressing/Treatment Collagen  (Hydrofera Ready)   Wound Length (cm) 0.1 cm   Wound Width (cm) 0.4 cm   Wound Depth (cm) 0.4 cm   Wound Surface Area (cm^2) 0.04 cm^2   Change in Wound Size % (l*w) 90.48   Wound Volume (cm^3) 0.016 cm^3   Wound Healing % 62   Wound Assessment Pink/red   Drainage Amount Small   Drainage Description Serosanguinous   Odor None   Leidy-wound Assessment Hyperkeratosis (callous)   Wound Thickness Description not for Pressure Injury Full thickness

## 2023-01-11 NOTE — WOUND CARE
Discharge Instructions for  Walt Yates  [facility] [address] [place] [address], [address]  Phone [phone]   Fax [phone]      NAME:  Hawa Postal OF BIRTH:  1950  MEDICAL RECORD NUMBER:  [id number]  DATE:  1/11/2023    Return Appointment:   1 week with Brett Gowers, DPM      Instructions: Left great toe:  Cleanse with normal saline. Soak with Vashe wound solution for at least 15 seconds before dressing application. Apply Iodosorb- apply thin layer to wound bed, cover with cover dressing, change daily . Cover with Gauze and wrap with rolled gauze. Change Daily     May Shower and Get Wound Wet-Dry Toe and Apply Iodosorb     Wear Compression Stockings to Both Legs-May Remove at Night  Please elevate leg when sitting. Increase dietary protein intake to at least 60 grams per day. May use supplements such as Ensure Max & Massimo            Should you experience increased redness, swelling, pain, foul odor, size of wound(s), or have a temperature over 101 degrees please contact the [address] [place] [address] Road at [phone] or if after hours contact your primary care physician or go to the hospital emergency department. PLEASE NOTE: IF YOU ARE UNABLE TO OBTAIN WOUND SUPPLIES, CONTINUE TO USE THE SUPPLIES YOU HAVE AVAILABLE UNTIL YOU ARE ABLE TO REACH US. IT IS MOST IMPORTANT TO KEEP THE WOUND COVERED AT ALL TIMES.     Electronically signed Abigail Euceda RN on 1/11/2023 at 8:25 AM

## 2023-01-18 ENCOUNTER — HOSPITAL ENCOUNTER (OUTPATIENT)
Dept: WOUND CARE | Age: 73
Discharge: HOME | End: 2023-01-18
Payer: MEDICARE

## 2023-01-18 VITALS
BODY MASS INDEX: 22.07 KG/M2 | DIASTOLIC BLOOD PRESSURE: 85 MMHG | RESPIRATION RATE: 18 BRPM | TEMPERATURE: 97.8 F | HEIGHT: 74 IN | HEART RATE: 72 BPM | WEIGHT: 172 LBS | SYSTOLIC BLOOD PRESSURE: 160 MMHG

## 2023-01-18 DIAGNOSIS — L89.893 PRESSURE INJURY OF LEFT FOOT, STAGE 3 (HCC): Primary | ICD-10-CM

## 2023-01-18 PROCEDURE — 11042 DBRDMT SUBQ TIS 1ST 20SQCM/<: CPT

## 2023-01-18 RX ORDER — LIDOCAINE 40 MG/G
CREAM TOPICAL ONCE
OUTPATIENT
Start: 2023-01-18 | End: 2023-01-18

## 2023-01-18 RX ORDER — GENTAMICIN SULFATE 1 MG/G
OINTMENT TOPICAL ONCE
OUTPATIENT
Start: 2023-01-18 | End: 2023-01-18

## 2023-01-18 RX ORDER — CLOBETASOL PROPIONATE 0.5 MG/G
OINTMENT TOPICAL ONCE
OUTPATIENT
Start: 2023-01-18 | End: 2023-01-18

## 2023-01-18 RX ORDER — LIDOCAINE 50 MG/G
OINTMENT TOPICAL ONCE
OUTPATIENT
Start: 2023-01-18 | End: 2023-01-18

## 2023-01-18 RX ORDER — LIDOCAINE HYDROCHLORIDE 40 MG/ML
SOLUTION TOPICAL ONCE
OUTPATIENT
Start: 2023-01-18 | End: 2023-01-18

## 2023-01-18 RX ORDER — GINSENG 100 MG
CAPSULE ORAL ONCE
OUTPATIENT
Start: 2023-01-18 | End: 2023-01-18

## 2023-01-18 RX ORDER — LIDOCAINE HYDROCHLORIDE 20 MG/ML
JELLY TOPICAL ONCE
OUTPATIENT
Start: 2023-01-18 | End: 2023-01-18

## 2023-01-18 RX ORDER — BETAMETHASONE DIPROPIONATE 0.05 %
OINTMENT (GRAM) TOPICAL ONCE
OUTPATIENT
Start: 2023-01-18 | End: 2023-01-18

## 2023-01-18 RX ORDER — BACITRACIN, NEOMYCIN, POLYMYXIN B 400; 3.5; 5 [USP'U]/G; MG/G; [USP'U]/G
OINTMENT TOPICAL ONCE
OUTPATIENT
Start: 2023-01-18 | End: 2023-01-18

## 2023-01-18 RX ORDER — BACITRACIN ZINC AND POLYMYXIN B SULFATE 500; 1000 [USP'U]/G; [USP'U]/G
OINTMENT TOPICAL ONCE
OUTPATIENT
Start: 2023-01-18 | End: 2023-01-18

## 2023-01-18 NOTE — FLOWSHEET NOTE
01/18/23 0816   Wound 12/14/22 Toe (Comment  which one) Left Wound #1  great toe   Date First Assessed/Time First Assessed: 12/14/22 0901   Present on Hospital Admission: Yes  Wound Approximate Age at First Assessment (Weeks): 48 weeks  Location: (c) Toe (Comment  which one)  Wound Location Orientation: Left  Wound Description (Comm. ..    Wound Image     Wound Etiology Other   Dressing Status Old drainage noted   Wound Cleansed Cleansed with saline   Dressing/Treatment   (iodosorb)   Wound Length (cm) 0.1 cm   Wound Width (cm) 0.3 cm   Wound Depth (cm) 0.4 cm   Wound Surface Area (cm^2) 0.03 cm^2   Change in Wound Size % (l*w) 92.86   Wound Volume (cm^3) 0.012 cm^3   Wound Healing % 71   Post-Procedure Length (cm) 0.5 cm   Post-Procedure Width (cm) 1.4 cm   Post-Procedure Depth (cm) 0.4 cm   Post-Procedure Surface Area (cm^2) 0.7 cm^2   Post-Procedure Volume (cm^3) 0.28 cm^3   Wound Assessment Pink/red   Drainage Amount Small   Drainage Description Serosanguinous   Odor None   Leidy-wound Assessment Hyperkeratosis (callous)   Wound Thickness Description not for Pressure Injury Full thickness

## 2023-01-18 NOTE — PROGRESS NOTES
[address], [address] RECORD NUMBER:  [id number]  AGE: 67 y.o. GENDER: male  : 1950  EPISODE DATE:  2023    Subjective:     Chief Complaint   Patient presents with    Wound Check     Left great toe        George Peters is a 67 y.o. Black / [place] American male who presents with a wound to the left plantar distal hallux. He is taking his daily shower ensuring to wash the toe and then leave the shower. He is using Iodosorb to the wound. Patient states that he is ready to plan for his surgery    HISTORY of PRESENT ILLNESS HPI    Nature: Painless  Location: Left hallux  Duration: Summer 2022  Onset: Patient states it started as callusing  Course: Degrading  Aggravating/Alleviating: Worsened with pressure, improved with compression and rest  Treatment: Iodosorb    Ulcer Identification:  Ulcer Type: pressure    Contributing Factors: chronic pressure    Wound: Pressure stage III        PAST MEDICAL HISTORY    Past Medical History:   Diagnosis Date    Idiopathic chronic venous hypertension of left lower extremity with ulcer and inflammation ([address] 2022    Peptic ulcer disease     Pressure injury of left foot, stage 3 ([address] 3/9/2022    Ulcer of left heel, with fat layer exposed ([address] 2022        PAST SURGICAL HISTORY    Past Surgical History:   Procedure Laterality Date    HERNIA REPAIR      hiatal hernia repair       FAMILY HISTORY    History reviewed. No pertinent family history. SOCIAL HISTORY    Social History     Tobacco Use    Smoking status: Never    Smokeless tobacco: Former   Substance Use Topics    Alcohol use:  Yes       ALLERGIES    No Known Allergies    MEDICATIONS    Current Outpatient Medications on File Prior to Encounter   Medication Sig Dispense Refill    Multiple Vitamin (MVI, CELEBRATE, CHEWABLE TABLET) Take 1 tablet by mouth daily      celecoxib (CELEBREX) 50 MG capsule TAKE 1 CAPSULE BY MOUTH TWICE DAILY (Patient not taking: Reported on 1/18/2023)      furosemide (LASIX) 20 MG tablet Take 20 mg by mouth daily as needed (Patient not taking: Reported on 1/18/2023)      metoclopramide (REGLAN) 10 MG tablet Take 10 mg by mouth 2 times daily (Patient not taking: Reported on 1/18/2023)      acamprosate (CAMPRAL) 333 MG tablet Take 666 mg by mouth 3 times daily (Patient not taking: No sig reported)       No current facility-administered medications on file prior to encounter. REVIEW OF SYSTEMS    Pertinent items are noted in HPI. Objective:     BP (!) 160/85   Pulse 72   Temp 97.8 °F (36.6 °C) (Temporal)   Resp 18   Ht 6' 2\" (1.88 m)   Wt 172 lb (78 kg)   BMI 22.08 kg/m²     Wt Readings from Last 3 Encounters:   01/18/23 172 lb (78 kg)   01/11/23 174 lb (78.9 kg)   01/04/23 173 lb (78.5 kg)       PHYSICAL EXAM    General: well developed, well nourished, pleasant , NAD. Hygiene good  Psych: cooperative. Pleasant. No anxiety or depression. Normal mood and affect. HEENT: Normocephalic, atraumatic. EOMI. Conjunctiva clear. No scleral icterus. Neck: Normal range of motion. No masses. Chest: Good air entry bilaterally. Respirations nonlabored  Cardio: Normal heart sounds,no rubs, murmurs or gallops  Abdomen: Soft, nontender, nondistended, normoactive bowel sounds    Vascular: DP and PT pulses are palpable at 2/4 bilateral. Skin temperature is uniform from proximal to distal bilateral. Hair growth is absent bilateral. No erythema, edema, heat is appreciated bilateral. No evidence of cellulitis. Derm: Nails 1-5 bilateral are thickened, discolored and with subungual debris. No paronychial infections are noted. Skin is atrophic and xerotic. No subcutaneous masses or hyperpigmented lesions are present.    Neuro: Epicritic sensation is present bilateral. Protective sensation is present with 5.07 SWMF testing to all 10 sites bilateral. Muscle tone and bulk is symmetric bilateral.  Msk: Muscle strength is 5/5 for all prime movers of the foot bilateral.  Rigid contracture at the hallux IPJ and lesser digits PIPJ left foot. Plantarflexed first ray left foot. Full thickness ulceration is noted to the plantar distal left hallux. Hyperkeratotic rim with granular base. No erythema, edema, purulence or odor. Soft end feel with no bone exposed or palpable. No undermining or sinus track is noted. No fluctuance with palpation. Wound 12/14/22 Toe (Comment  which one) Left Wound #1  great toe (Active)   Wound Image    01/18/23 0816   Wound Etiology Other 01/18/23 0816   Dressing Status Old drainage noted 01/18/23 0816   Wound Cleansed Cleansed with saline 01/18/23 0816   Dressing/Treatment Collagen 01/11/23 0806   Offloading for Diabetic Foot Ulcers Diabetic shoes/inserts 01/04/23 0909   Wound Length (cm) 0.1 cm 01/18/23 0816   Wound Width (cm) 0.3 cm 01/18/23 0816   Wound Depth (cm) 0.4 cm 01/18/23 0816   Wound Surface Area (cm^2) 0.03 cm^2 01/18/23 0816   Change in Wound Size % (l*w) 92.86 01/18/23 0816   Wound Volume (cm^3) 0.012 cm^3 01/18/23 0816   Wound Healing % 71 01/18/23 0816   Post-Procedure Length (cm) 0.5 cm 01/18/23 0816   Post-Procedure Width (cm) 1.4 cm 01/18/23 0816   Post-Procedure Depth (cm) 0.4 cm 01/18/23 0816   Post-Procedure Surface Area (cm^2) 0.7 cm^2 01/18/23 0816   Post-Procedure Volume (cm^3) 0.28 cm^3 01/18/23 0816   Wound Assessment Pink/red 01/18/23 0816   Drainage Amount Small 01/18/23 0816   Drainage Description Serosanguinous 01/18/23 0816   Odor None 01/18/23 0816   Leidy-wound Assessment Hyperkeratosis (callous) 01/18/23 0816   Margins Attached edges 12/30/22 0841   Wound Thickness Description not for Pressure Injury Full thickness 01/18/23 0816   Number of days: 35          DATA REVIEW:  No results found for this or any previous visit (from the past 336 hour(s)).       Assessment and Plan:     Problem List Items Addressed This Visit    Pressure ulcer stage III    Debridement Wound Care        Problem List Items Addressed This Visit          Other    Pressure injury of left foot, stage 3 ([address] - Primary    Relevant Orders    Initiate Outpatient Wound Care Protocol       Procedure Note  Indications:  Based on my examination of this patient's wound(s)/ulcer(s) today, debridement is required to promote healing and evaluate the wound base. Performed by: Sophia Huitron DPM    Consent obtained: yes    Time out taken: yes    Debridement: surgical 59561    Using #15 blade scalpel the wound(s)/ulcer(s) was/were sharply debrided down through and including the removal of    Subctaneous Tissue       Pre Debridement Measurements:  Are located in the Wound/Ulcer Documentation Flow Sheet    Wound/Ulcer #:     Post Debridement Measurements:  Wound/Ulcer Descriptions are Pre Debridement except measurements:    See flowsheet    Total Surface Area Debrided:  0.7 sq cm     Estimated Blood Loss:  Minimal    Hemostasis Achieved: Pressure    Procedural Pain: 0/ 10     Post Procedural Pain: 0/ 10     Response to treatment: well tolerated by patient          Patient examined and evaluated. Educated patient and barriers to healing. The wound had started to seal with hyperkeratotic tissue over the surface however underneath there was still a large base to the wound. This was debrided today. Discussed with the patient that even though we have tried aggressive cares this wound is not healing due to the pressure on the toe. Discussed various offloading devices such as a Darco wedge. Patient states that he had a Darco wedge in the past however he has bilateral hip replacements and his left leg is longer than the right and when he tried this in the past he was unable to tolerate it. Discussed a flat surgical shoe with a peg insole however most of the peg insole stopped to proximal from where his wound is located.   We did give him information on how to obtain both of these and he can decide this. I believe that we should at this point schedule his arthrodesis of the hallux IPJ to relieve the pressure as I do not believe his wound will heal without this. Patient was amenable to this and does understand that with a wound present that he does have a high risk of infection and we will likely place him on antibiotics for the. In the pre and postoperative to ensure that this does not happen. Discussed dates of surgery with him and we will move forward with March 3, 2023. Pressure reduction is paramount due to location of the wound. Will control this through the use of his [employer]oka shoes which applied more pressure to the posterior aspect of the foot and his customized orthotics. .    Infection control is required to prevent loss of limb/life - maintain dressing coverage at all times, wash hands before and after dressing changes. Monitor for erythema, edema, odor, and thick yellow drainage and contact the center immediately if noted. Proper nutrition to support skin growth includes increased protein, vitamins and amino acids - animal based foods and Massimo recommended. Moisture management to prevent maceration and dryness - keep wound covered at all times outside dressing care, do not \"air out\" or soak. Dressing will consist of collagen underneath Hydrofera Blue ready. Patient will not be able to shower and get the foot wet. Edema control provided by use of compression stockings. Patient will follow up in 2 weeks    Patient instructed on the following: Follow all wound dressing instructions. Do not get dressing or wound wet. May shower if wound can be effectively kept dry. Cast covers may be purchased at [facility] and [facility].     Should you experience increased redness, swelling, pain, foul odor, size of wound(s), or have a temperature over 101 degrees please [facility] at [phone] or if after hours contact your primary care physician or go to the hospitals emergency department.     Orders Placed This Encounter   Procedures    Initiate Outpatient Wound Care Protocol     Cleanse wound with saline    If wound contains bioburden or contamination cleanse with wound cleanser or antimicrobial solution     For normal periwound tissue without irritation nor maceration, apply topical skin protectant    For periwound tissue with irritation and/or maceration, apply zinc based product, topical steroid cream/ointment, or equivalent     For wounds with dry firm black eschar and/or without exudate, apply betadine and leave open to air      For wounds with scant/small to no exudate or drainage, apply wound gel, hydrocolloid, polymer, or equivalent and cover with secondary dressing/foam      For wounds with moderate/large exudate or drainage, apply alginate, hydrofiber, polymer, or equivalent and cover with secondary dressing/foam    For wounds with nonviable tissue requiring removal, apply chemical or mechanical debrider and cover with secondary dressing/foam    For wounds with tunneling, dead space, or cavity, fill or pack with strip/gauze/kerlex to fit and cover with secondary dressing/foam    For wounds with adequate granulation or epithelization, apply wound gel, hydrocolloid, polymer, collagen, or transparent film, and cover secondary dry dressing/foam    For wounds that need additional secondary dressing to help pad or control additional drainage/exudates, add foam, absorbent pad or hydrocolloid    For wounds with suspected or known infection, apply antimicrobial mesh and/or antimicrobial alginate/hydrofiber, or antimicrobial solution moistened gauze/kerlex, or equivalent and cover with secondary dressing/foam    Compression Management needed for edema control, apply multilayer compression or tubular garment or equivalent    Offloading Management needed for pressure relief, apply offloading shoe/boot or equivalent     Standing Status:   Standing     Number of Occurrences:   1           Treatment Note please see attached Discharge Instructions    Written patient dismissal instructions given to patient or POA.         Electronically signed by Kim Cedeño DPM on 1/18/2023 at 9:06 AM

## 2023-01-18 NOTE — WOUND CARE
Discharge Instructions for  Walt Yates  [address] [place] [address]  [address], [address]  Phone [phone]   Fax [phone]      NAME:  Boo Plaza OF BIRTH:  1950  MEDICAL RECORD NUMBER:  [id number]  DATE:  1/18/2023    Return Appointment:   2 weeks with Bianca Lange DPM      Instructions:  Left great toe:  Cleanse with normal saline. Soak with Vashe wound solution for at least 15 seconds before dressing application. Apply collagen. Cut product to approximately size and apply to wound bed. Hydrofera Ready: Cut to wound size, place in wound bed, shiny side out. Cover with Gauze and wrap with rolled gauze. Change 3x weekly. May Shower and Get Wound Wet-Dry Toe and Apply Iodosorb     Wear Compression Stockings to Both Legs-May Remove at Night  Please elevate leg when sitting. Increase dietary protein intake to at least 60 grams per day. May use supplements such as Ensure Max & Massimo      Will plan to schedule your foot surgery by March 3. Should you experience increased redness, swelling, pain, foul odor, size of wound(s), or have a temperature over 101 degrees please contact the [address] [place] [address] Road at [phone] or if after hours contact your primary care physician or go to the hospital emergency department. PLEASE NOTE: IF YOU ARE UNABLE TO OBTAIN WOUND SUPPLIES, CONTINUE TO USE THE SUPPLIES YOU HAVE AVAILABLE UNTIL YOU ARE ABLE TO REACH US. IT IS MOST IMPORTANT TO KEEP THE WOUND COVERED AT ALL TIMES.     Electronically signed Chen Mcarthur RN on 1/18/2023 at 8:25 AM

## 2023-01-18 NOTE — DISCHARGE INSTRUCTIONS
Discharge Instructions for  Walt Yates  [address] [place] [address]  [address], [address]  Phone [phone]   Fax [phone]      NAME:  Andreea Serrano OF BIRTH:  1950  MEDICAL RECORD NUMBER:  [id number]  DATE:  @ED@    Return Appointment:   2 weeks with Augustina Mohr DPM      Instructions:  Left great toe:  Cleanse with normal saline. Soak with Vashe wound solution for at least 15 seconds before dressing application. Apply collagen. Cut product to approximately size and apply to wound bed. Hydrofera Ready: Cut to wound size, place in wound bed, shiny side out. Cover with Gauze and wrap with rolled gauze. Change 3x weekly. May Shower and Get Wound Wet-Dry Toe and Apply Iodosorb     Wear Compression Stockings to Both Legs-May Remove at Night  Please elevate leg when sitting. Increase dietary protein intake to at least 60 grams per day. May use supplements such as Ensure Max & Massimo       Will plan to schedule your foot surgery by March 3. Should you experience increased redness, swelling, pain, foul odor, size of wound(s), or have a temperature over 101 degrees please contact the [address] [place] [address] Road at [phone] or if after hours contact your primary care physician or go to the hospital emergency department. PLEASE NOTE: IF YOU ARE UNABLE TO OBTAIN WOUND SUPPLIES, CONTINUE TO USE THE SUPPLIES YOU HAVE AVAILABLE UNTIL YOU ARE ABLE TO REACH US. IT IS MOST IMPORTANT TO KEEP THE WOUND COVERED AT ALL TIMES.     Electronically signed Johana Torre RN on 1/18/2023 at 8:47 AM

## 2023-02-01 ENCOUNTER — HOSPITAL ENCOUNTER (OUTPATIENT)
Dept: WOUND CARE | Age: 73
Discharge: HOME | End: 2023-02-01
Payer: MEDICARE

## 2023-02-01 VITALS
SYSTOLIC BLOOD PRESSURE: 168 MMHG | TEMPERATURE: 98.3 F | OXYGEN SATURATION: 97 % | RESPIRATION RATE: 18 BRPM | WEIGHT: 175 LBS | DIASTOLIC BLOOD PRESSURE: 89 MMHG | HEART RATE: 82 BPM | BODY MASS INDEX: 22.46 KG/M2 | HEIGHT: 74 IN

## 2023-02-01 DIAGNOSIS — L89.893 PRESSURE INJURY OF LEFT FOOT, STAGE 3 (HCC): Primary | ICD-10-CM

## 2023-02-01 PROCEDURE — 11042 DBRDMT SUBQ TIS 1ST 20SQCM/<: CPT

## 2023-02-01 RX ORDER — BACITRACIN, NEOMYCIN, POLYMYXIN B 400; 3.5; 5 [USP'U]/G; MG/G; [USP'U]/G
OINTMENT TOPICAL ONCE
OUTPATIENT
Start: 2023-02-01 | End: 2023-02-01

## 2023-02-01 RX ORDER — LIDOCAINE HYDROCHLORIDE 20 MG/ML
JELLY TOPICAL ONCE
OUTPATIENT
Start: 2023-02-01 | End: 2023-02-01

## 2023-02-01 RX ORDER — LIDOCAINE 40 MG/G
CREAM TOPICAL ONCE
OUTPATIENT
Start: 2023-02-01 | End: 2023-02-01

## 2023-02-01 RX ORDER — LIDOCAINE HYDROCHLORIDE 40 MG/ML
SOLUTION TOPICAL ONCE
OUTPATIENT
Start: 2023-02-01 | End: 2023-02-01

## 2023-02-01 RX ORDER — GENTAMICIN SULFATE 1 MG/G
OINTMENT TOPICAL ONCE
OUTPATIENT
Start: 2023-02-01 | End: 2023-02-01

## 2023-02-01 RX ORDER — LIDOCAINE 50 MG/G
OINTMENT TOPICAL ONCE
OUTPATIENT
Start: 2023-02-01 | End: 2023-02-01

## 2023-02-01 RX ORDER — BACITRACIN ZINC AND POLYMYXIN B SULFATE 500; 1000 [USP'U]/G; [USP'U]/G
OINTMENT TOPICAL ONCE
OUTPATIENT
Start: 2023-02-01 | End: 2023-02-01

## 2023-02-01 RX ORDER — BETAMETHASONE DIPROPIONATE 0.05 %
OINTMENT (GRAM) TOPICAL ONCE
OUTPATIENT
Start: 2023-02-01 | End: 2023-02-01

## 2023-02-01 RX ORDER — GINSENG 100 MG
CAPSULE ORAL ONCE
OUTPATIENT
Start: 2023-02-01 | End: 2023-02-01

## 2023-02-01 RX ORDER — CLOBETASOL PROPIONATE 0.5 MG/G
OINTMENT TOPICAL ONCE
OUTPATIENT
Start: 2023-02-01 | End: 2023-02-01

## 2023-02-01 NOTE — WOUND CARE
Discharge Instructions for  Walt Yates  [address] [place] [address], [address]  Phone [phone]   Fax [phone]      NAME:  Colletta Postin OF BIRTH:  1950  MEDICAL RECORD NUMBER:  [id number]  DATE:  2/1/2023    Return Appointment:   2 weeks with Shorty Mckinnon DPM      Instructions: Left great toe:  Wound is healed! Continue to wear diabetic inserts in shoes at all times when standing or walking. Left plantar 1st met head:  Cleanse with normal saline. Hydrofera Ready: Cut to wound size, place in wound bed, shiny side out. Cover with Coversite or gauze and tape. Change dressing 3 times per week     Wear Compression Stockings to Both Legs-May Remove at Night  Please elevate leg when sitting. Increase dietary protein intake to at least 60 grams per day. May use supplements such as Ensure Max & Massimo       Left foot surgery scheduled for March 3. Should you experience increased redness, swelling, pain, foul odor, size of wound(s), or have a temperature over 101 degrees please contact the [address] [place] [address] Road at [phone] or if after hours contact your primary care physician or go to the hospital emergency department. PLEASE NOTE: IF YOU ARE UNABLE TO OBTAIN WOUND SUPPLIES, CONTINUE TO USE THE SUPPLIES YOU HAVE AVAILABLE UNTIL YOU ARE ABLE TO REACH US. IT IS MOST IMPORTANT TO KEEP THE WOUND COVERED AT ALL TIMES.     Electronically signed Anne Bonilla PT, [facility] on 2/1/2023 at 8:26 AM

## 2023-02-01 NOTE — PROGRESS NOTES
[address], [address] RECORD NUMBER:  [id number]  AGE: 67 y.o. GENDER: male  : 1950  EPISODE DATE:  2023    Subjective:     Chief Complaint   Patient presents with    Wound Check     Left great toe wound        Paige Delvalle is a 67 y.o. Black / [place] American male who presents with a wound to the left plantar distal hallux. He is on the surgical schedule with myself for March 3 to fuse the hallux IPJ. Patient denies any drainage. HISTORY of PRESENT ILLNESS HPI    Nature: Painless  Location: Left hallux  Duration: Summer 2022  Onset: Patient states it started as callusing  Course: Resolved  Aggravating/Alleviating: Worsened with pressure, improved with compression and rest  Treatment: Collagen with silver    Ulcer Identification:  Ulcer Type: pressure    Contributing Factors: chronic pressure    Wound: Pressure stage III        PAST MEDICAL HISTORY    Past Medical History:   Diagnosis Date    Idiopathic chronic venous hypertension of left lower extremity with ulcer and inflammation ([address] 2022    Peptic ulcer disease     Pressure injury of left foot, stage 3 ([address] 3/9/2022    Ulcer of left heel, with fat layer exposed ([facility] [address] 2022        PAST SURGICAL HISTORY    Past Surgical History:   Procedure Laterality Date    HERNIA REPAIR      hiatal hernia repair       FAMILY HISTORY    History reviewed. No pertinent family history. SOCIAL HISTORY    Social History     Tobacco Use    Smoking status: Never    Smokeless tobacco: Former   Substance Use Topics    Alcohol use: Yes       ALLERGIES    No Known Allergies    MEDICATIONS    Current Outpatient Medications on File Prior to Encounter   Medication Sig Dispense Refill    Multiple Vitamin (MVI, CELEBRATE, CHEWABLE TABLET) Take 1 tablet by mouth daily       No current facility-administered medications on file prior to encounter. REVIEW OF SYSTEMS    Pertinent items are noted in HPI. Objective:     BP (!) 168/89   Pulse 82   Temp 98.3 °F (36.8 °C) (Oral)   Resp 18   Ht 6' 2\" (1.88 m)   Wt 175 lb (79.4 kg)   SpO2 97%   BMI 22.47 kg/m²     Wt Readings from Last 3 Encounters:   02/01/23 175 lb (79.4 kg)   01/18/23 172 lb (78 kg)   01/11/23 174 lb (78.9 kg)       PHYSICAL EXAM    General: well developed, well nourished, pleasant , NAD. Hygiene good  Psych: cooperative. Pleasant. No anxiety or depression. Normal mood and affect. HEENT: Normocephalic, atraumatic. EOMI. Conjunctiva clear. No scleral icterus. Neck: Normal range of motion. No masses. Chest: Good air entry bilaterally. Respirations nonlabored  Cardio: Normal heart sounds,no rubs, murmurs or gallops  Abdomen: Soft, nontender, nondistended, normoactive bowel sounds    Vascular: DP and PT pulses are palpable at 2/4 bilateral. Skin temperature is uniform from proximal to distal bilateral. Hair growth is absent bilateral. No erythema, edema, heat is appreciated bilateral. No evidence of cellulitis. Derm: Nails 1-5 bilateral are thickened, discolored and with subungual debris. No paronychial infections are noted. Skin is atrophic and xerotic. No subcutaneous masses or hyperpigmented lesions are present. Neuro: Epicritic sensation is present bilateral. Protective sensation is present with 5.07 SWMF testing to all 10 sites bilateral. Muscle tone and bulk is symmetric bilateral.  Msk: Muscle strength is 5/5 for all prime movers of the foot bilateral.  Rigid contracture at the hallux IPJ and lesser digits PIPJ left foot. Plantarflexed first ray left foot. Prior full thickness ulceration to the plantar distal left hallux with a thin layer of hyperkeratosis and healed intact epidermis beneath. No drainage. Recurrence of the subfirst metatarsal head left foot ulceration. There is heavy callusing overlying with trapped fluid underneath.   Upon debridement a full-thickness ulceration is noted with a macerated rim and a granular base. No bleeding. Wound 12/14/22 Toe (Comment  which one) Left Wound #1  great toe (Active)   Wound Image   02/01/23 0814   Wound Etiology Other 02/01/23 0814   Dressing Status Clean;Dry 02/01/23 0814   Wound Cleansed Cleansed with saline 02/01/23 0814   Dressing/Treatment Collagen 02/01/23 0814   Offloading for Diabetic Foot Ulcers Diabetic shoes/inserts 01/04/23 0909   Wound Length (cm) 0 cm 02/01/23 0814   Wound Width (cm) 0 cm 02/01/23 0814   Wound Depth (cm) 0 cm 02/01/23 0814   Wound Surface Area (cm^2) 0 cm^2 02/01/23 0814   Change in Wound Size % (l*w) 100 02/01/23 0814   Wound Volume (cm^3) 0 cm^3 02/01/23 0814   Wound Healing % 100 02/01/23 0814   Post-Procedure Length (cm) 0.5 cm 01/18/23 0816   Post-Procedure Width (cm) 1.4 cm 01/18/23 0816   Post-Procedure Depth (cm) 0.4 cm 01/18/23 0816   Post-Procedure Surface Area (cm^2) 0.7 cm^2 01/18/23 0816   Post-Procedure Volume (cm^3) 0.28 cm^3 01/18/23 0816   Wound Assessment Epithelialization 02/01/23 0814   Drainage Amount None 02/01/23 0814   Drainage Description Serosanguinous 01/18/23 0816   Odor None 02/01/23 0814   Leidy-wound Assessment Hyperkeratosis (callous) 01/18/23 0816   Margins Attached edges 12/30/22 0841   Wound Thickness Description not for Pressure Injury Full thickness 01/18/23 0816   Number of days: 48          DATA REVIEW:  No results found for this or any previous visit (from the past 336 hour(s)).       Assessment and Plan:     Problem List Items Addressed This Visit    Pressure ulcer stage III    Debridement Wound Care        Problem List Items Addressed This Visit          Other    Pressure injury of left foot, stage 3 ([facility] [address] - Primary    Relevant Orders    Initiate Outpatient Wound Care Protocol       Procedure Note  Indications:  Based on my examination of this patient's wound(s)/ulcer(s) today, debridement is required to promote healing and evaluate the wound base.    Performed by: Arely Olea DPM    Consent obtained: yes    Time out taken: yes    Debridement: surgical [id number]    Using #15 blade scalpel the wound(s)/ulcer(s) was/were sharply debrided down through and including the removal of    Subctaneous Tissue       Pre Debridement Measurements:  Are located in the Wound/Ulcer Documentation Flow Sheet    Wound/Ulcer #: Subfirst metatarsal head left foot    Post Debridement Measurements:  Wound/Ulcer Descriptions are Pre Debridement except measurements:    See flowsheet    Total Surface Area Debrided: 1 sq cm     Estimated Blood Loss:  Minimal    Hemostasis Achieved: Pressure    Procedural Pain: 0/ 10     Post Procedural Pain: 0/ 10     Response to treatment: well tolerated by patient          Patient examined and evaluated. Educated patient and barriers to healing. The wound to the plantar distal hallux has resolved. We will now move into focusing on preventing recurrence through his surgical date. The wound at the subfirst metatarsal head has recurred. This was uncovered with debridement of the blister. There is no signs of infection. Patient did admit that he wears his socks only inside the home and does not wear his custom insoles. Patient was advised that he does need to wear his custom insoles at all times inside and outside the home. Pressure reduction is paramount due to location of the wound. Will control this through the use of his [employer] shoes which applied more pressure to the posterior aspect of the foot and his customized orthotics. Infection control is required to prevent loss of limb/life - maintain dressing coverage at all times, wash hands before and after dressing changes. Monitor for erythema, edema, odor, and thick yellow drainage and contact the center immediately if noted. Proper nutrition to support skin growth includes increased protein, vitamins and amino acids - animal based foods and Massimo recommended.     Moisture management to prevent maceration and dryness - keep wound covered at all times outside dressing care, do not \"air out\" or soak. Dressing will consist of Hydrofera Blue Ready changed 3 times a week    Edema control provided by use of compression stockings. Patient will follow up in 2 weeks    Patient instructed on the following: Follow all wound dressing instructions. Do not get dressing or wound wet. May shower if wound can be effectively kept dry. Cast covers may be purchased at [facility] and [facility] in [place]. Should you experience increased redness, swelling, pain, foul odor, size of wound(s), or have a temperature over 101 degrees please contact the [address] [place] [address] Road at [phone] or if after hours contact your primary care physician or go to the hospital emergency department.     Orders Placed This Encounter   Procedures    Initiate Outpatient Wound Care Protocol     Cleanse wound with saline    If wound contains bioburden or contamination cleanse with wound cleanser or antimicrobial solution     For normal periwound tissue without irritation nor maceration, apply topical skin protectant    For periwound tissue with irritation and/or maceration, apply zinc based product, topical steroid cream/ointment, or equivalent     For wounds with dry firm black eschar and/or without exudate, apply betadine and leave open to air      For wounds with scant/small to no exudate or drainage, apply wound gel, hydrocolloid, polymer, or equivalent and cover with secondary dressing/foam      For wounds with moderate/large exudate or drainage, apply alginate, hydrofiber, polymer, or equivalent and cover with secondary dressing/foam    For wounds with nonviable tissue requiring removal, apply chemical or mechanical debrider and cover with secondary dressing/foam    For wounds with tunneling, dead space, or cavity, fill or pack with strip/gauze/kerlex to fit and cover with secondary dressing/foam    For wounds with adequate granulation or epithelization, apply wound gel, hydrocolloid, polymer, collagen, or transparent film, and cover secondary dry dressing/foam    For wounds that need additional secondary dressing to help pad or control additional drainage/exudates, add foam, absorbent pad or hydrocolloid    For wounds with suspected or known infection, apply antimicrobial mesh and/or antimicrobial alginate/hydrofiber, or antimicrobial solution moistened gauze/kerlex, or equivalent and cover with secondary dressing/foam    Compression Management needed for edema control, apply multilayer compression or tubular garment or equivalent    Offloading Management needed for pressure relief, apply offloading shoe/boot or equivalent     Standing Status:   Standing     Number of Occurrences:   1           Treatment Note please see attached Discharge Instructions    Written patient dismissal instructions given to patient or POA.         Electronically signed by Liane Alejandra DPM on 2/1/2023 at 8:30 AM

## 2023-02-01 NOTE — FLOWSHEET NOTE
02/01/23 0814   Left Leg Edema Point of Measurement   Leg circumference 39 cm   Ankle circumference 24.5 cm   Foot circumference 24.5 cm   Compression Therapy Compression stockings   Wound 12/14/22 Toe (Comment  which one) Left Wound #1  great toe   Date First Assessed/Time First Assessed: 12/14/22 0901   Present on Hospital Admission: Yes  Wound Approximate Age at First Assessment (Weeks): 48 weeks  Location: (c) Toe (Comment  which one)  Wound Location Orientation: Left  Wound Description (Comm. ..    Wound Image    Wound Etiology Other  (Neuropathy)   Dressing Status Clean;Dry   Wound Cleansed Cleansed with saline   Dressing/Treatment Collagen   Offloading for Diabetic Foot Ulcers Diabetic shoes/inserts   Wound Length (cm) 0 cm   Wound Width (cm) 0 cm   Wound Depth (cm) 0 cm   Wound Surface Area (cm^2) 0 cm^2   Change in Wound Size % (l*w) 100   Wound Volume (cm^3) 0 cm^3   Wound Healing % 100   Wound Assessment Epithelialization   Drainage Amount None   Odor None   Wound 02/01/23 Foot Left;Plantar 1st met head   Date First Assessed/Time First Assessed: 02/01/23 0843   Present on Hospital Admission: Yes  Location: Foot  Wound Location Orientation: Left;Plantar  Wound Description (Comments): 1st met head   Wound Image    Wound Etiology Other  (Neuropathy)   Dressing Status Clean  (closed blister)   Wound Cleansed Cleansed with saline   Dressing/Treatment Open to air   Offloading for Diabetic Foot Ulcers Diabetic shoes/inserts   Post-Procedure Length (cm) 3 cm   Post-Procedure Width (cm) 2 cm   Post-Procedure Depth (cm) 0.2 cm   Post-Procedure Surface Area (cm^2) 6 cm^2   Post-Procedure Volume (cm^3) 1.2 cm^3   Wound Assessment Pink/red   Drainage Amount Small   Drainage Description Serosanguinous   Odor None   Leidy-wound Assessment Hyperkeratosis (callous)   Wound Thickness Description not for Pressure Injury Full thickness   Pain Assessment   Pain Assessment None - Denies Pain   Post-debridement

## 2023-02-01 NOTE — DISCHARGE INSTRUCTIONS
Return Appointment:   2 weeks with Payton Jensen DPM        Instructions: Left great toe:  Wound is healed! Continue to wear diabetic inserts in shoes at all times when standing or walking. Left plantar 1st met head:  Cleanse with normal saline. Hydrofera Ready: Cut to wound size, place in wound bed, shiny side out. Cover with Coversite or gauze and tape. Change dressing 3 times per week     Wear Compression Stockings to Both Legs-May Remove at Night  Please elevate leg when sitting. Increase dietary protein intake to at least 60 grams per day. May use supplements such as Ensure Max & Massimo       Left foot surgery scheduled for March 3.

## 2023-02-01 NOTE — FLOWSHEET NOTE
02/01/23 0814   Left Leg Edema Point of Measurement   Leg circumference 39 cm   Ankle circumference 24.5 cm   Foot circumference 24.5 cm   Compression Therapy Compression stockings   Wound 12/14/22 Toe (Comment  which one) Left Wound #1  great toe   Date First Assessed/Time First Assessed: 12/14/22 0901   Present on Hospital Admission: Yes  Wound Approximate Age at First Assessment (Weeks): 48 weeks  Location: (c) Toe (Comment  which one)  Wound Location Orientation: Left  Wound Description (Comm. ..    Wound Image    Wound Etiology Other  (Neuropathy)   Dressing Status Clean;Dry   Wound Cleansed Cleansed with saline   Dressing/Treatment Collagen   Wound Length (cm) 0 cm   Wound Width (cm) 0 cm   Wound Depth (cm) 0 cm   Wound Surface Area (cm^2) 0 cm^2   Change in Wound Size % (l*w) 100   Wound Volume (cm^3) 0 cm^3   Wound Healing % 100   Wound Assessment Epithelialization   Drainage Amount None   Odor None   Pain Assessment   Pain Assessment None - Denies Pain

## 2023-02-15 ENCOUNTER — HOSPITAL ENCOUNTER (OUTPATIENT)
Dept: WOUND CARE | Age: 73
Discharge: HOME | End: 2023-02-15
Payer: MEDICARE

## 2023-02-15 VITALS
RESPIRATION RATE: 18 BRPM | DIASTOLIC BLOOD PRESSURE: 83 MMHG | BODY MASS INDEX: 21.94 KG/M2 | OXYGEN SATURATION: 99 % | HEART RATE: 70 BPM | SYSTOLIC BLOOD PRESSURE: 148 MMHG | WEIGHT: 171 LBS | HEIGHT: 74 IN

## 2023-02-15 DIAGNOSIS — L89.893 PRESSURE INJURY OF LEFT FOOT, STAGE 3 (HCC): Primary | ICD-10-CM

## 2023-02-15 PROCEDURE — 99212 OFFICE O/P EST SF 10 MIN: CPT

## 2023-02-15 RX ORDER — LIDOCAINE HYDROCHLORIDE 20 MG/ML
JELLY TOPICAL ONCE
Status: CANCELLED | OUTPATIENT
Start: 2023-02-15 | End: 2023-02-15

## 2023-02-15 RX ORDER — BACITRACIN ZINC AND POLYMYXIN B SULFATE 500; 1000 [USP'U]/G; [USP'U]/G
OINTMENT TOPICAL ONCE
Status: CANCELLED | OUTPATIENT
Start: 2023-02-15 | End: 2023-02-15

## 2023-02-15 RX ORDER — BACITRACIN, NEOMYCIN, POLYMYXIN B 400; 3.5; 5 [USP'U]/G; MG/G; [USP'U]/G
OINTMENT TOPICAL ONCE
Status: CANCELLED | OUTPATIENT
Start: 2023-02-15 | End: 2023-02-15

## 2023-02-15 RX ORDER — LIDOCAINE HYDROCHLORIDE 40 MG/ML
SOLUTION TOPICAL ONCE
Status: CANCELLED | OUTPATIENT
Start: 2023-02-15 | End: 2023-02-15

## 2023-02-15 RX ORDER — LIDOCAINE 50 MG/G
OINTMENT TOPICAL ONCE
Status: CANCELLED | OUTPATIENT
Start: 2023-02-15 | End: 2023-02-15

## 2023-02-15 RX ORDER — CLOBETASOL PROPIONATE 0.5 MG/G
OINTMENT TOPICAL ONCE
Status: CANCELLED | OUTPATIENT
Start: 2023-02-15 | End: 2023-02-15

## 2023-02-15 RX ORDER — BETAMETHASONE DIPROPIONATE 0.05 %
OINTMENT (GRAM) TOPICAL ONCE
Status: CANCELLED | OUTPATIENT
Start: 2023-02-15 | End: 2023-02-15

## 2023-02-15 RX ORDER — GINSENG 100 MG
CAPSULE ORAL ONCE
Status: CANCELLED | OUTPATIENT
Start: 2023-02-15 | End: 2023-02-15

## 2023-02-15 RX ORDER — GENTAMICIN SULFATE 1 MG/G
OINTMENT TOPICAL ONCE
Status: CANCELLED | OUTPATIENT
Start: 2023-02-15 | End: 2023-02-15

## 2023-02-15 RX ORDER — LIDOCAINE 40 MG/G
CREAM TOPICAL ONCE
Status: CANCELLED | OUTPATIENT
Start: 2023-02-15 | End: 2023-02-15

## 2023-02-15 NOTE — DISCHARGE INSTRUCTIONS
Return Appointment:   Discharge from wound center at this time. Instructions: Left plantar 1st met head:  Wound is healed! May continue to wear adhesive bandage as desired. No other dressing needed at this time. Wear Compression Stockings to Both Legs-May Remove at Night  Left foot surgery scheduled for March 3. Continue to wear diabetic inserts in shoes at all times when standing or walking.

## 2023-02-15 NOTE — WOUND CARE
Discharge Instructions for  Walt Yates  [address]  [address], [address]  Phone [phone]   Fax [phone]      NAME:  Gerri Zuñiga OF BIRTH:  1950  MEDICAL RECORD NUMBER:  [id number]  DATE:  2/15/2023    Return Appointment:   Discharge from wound center at this time. Instructions: Left plantar 1st met head:  Wound is healed! May continue to wear adhesive bandage as desired. No other dressing needed at this time. Wear Compression Stockings to Both Legs-May Remove at Night  Left foot surgery scheduled for March 3. Continue to wear diabetic inserts in shoes at all times when standing or walking. Should you experience increased redness, swelling, pain, foul odor, size of wound(s), or have a temperature over 101 degrees please contact the [address] [place] [address] Road at [phone] or if after hours contact your primary care physician or go to the hospital emergency department. PLEASE NOTE: IF YOU ARE UNABLE TO OBTAIN WOUND SUPPLIES, CONTINUE TO USE THE SUPPLIES YOU HAVE AVAILABLE UNTIL YOU ARE ABLE TO REACH US. IT IS MOST IMPORTANT TO KEEP THE WOUND COVERED AT ALL TIMES.     Electronically signed Azeb Muller PT, [address] on 2/15/2023 at 9:16 AM

## 2023-02-15 NOTE — FLOWSHEET NOTE
02/15/23 0909   Left Leg Edema Point of Measurement   Leg circumference 37 cm   Ankle circumference 24 cm   Foot circumference 26 cm   Compression Therapy Compression stockings   Wound 02/01/23 Foot Left;Plantar 1st met head   Date First Assessed/Time First Assessed: 02/01/23 0843   Present on Hospital Admission: Yes  Location: Foot  Wound Location Orientation: Left;Plantar  Wound Description (Comments): 1st met head   Wound Image    Wound Etiology Other  (Neuropathy)   Dressing Status Clean   Wound Cleansed Cleansed with saline   Dressing/Treatment Hydrofera blue; Adhesive bandage   Offloading for Diabetic Foot Ulcers Diabetic shoes/inserts   Wound Length (cm) 0 cm   Wound Width (cm) 0 cm   Wound Depth (cm) 0 cm   Wound Surface Area (cm^2) 0 cm^2   Wound Volume (cm^3) 0 cm^3   Wound Assessment Epithelialization   Drainage Amount None   Odor None   Pain Assessment   Pain Assessment None - Denies Pain

## 2023-02-15 NOTE — PROGRESS NOTES
[address], [address] RECORD NUMBER:  [id number]  AGE: 67 y.o. GENDER: male  : 1950  EPISODE DATE:  2/15/2023    Subjective:     Chief Complaint   Patient presents with    Wound Check     Left plantar 1st met head wound        Sherron Fairbanks is a 67 y.o. Black / [place] American male who presents with a wound to the left plantar distal hallux. He is on the surgical schedule with myself for March 3 to fuse the hallux IPJ. Patient denies any drainage and believes the wounds are healed. HISTORY of PRESENT ILLNESS HPI    Nature: Painless  Location: Left hallux  Duration: Summer 2022  Onset: Patient states it started as callusing  Course: Resolved  Aggravating/Alleviating: Worsened with pressure, improved with compression and rest  Treatment: none currently    Ulcer Identification:  Ulcer Type: pressure    Contributing Factors: chronic pressure    Wound: Pressure stage III        PAST MEDICAL HISTORY    Past Medical History:   Diagnosis Date    Idiopathic chronic venous hypertension of left lower extremity with ulcer and inflammation ([facility] [address] 2022    Peptic ulcer disease     Pressure injury of left foot, stage 3 ([facility] [address] 3/9/2022    Ulcer of left heel, with fat layer exposed ([address] 2022        PAST SURGICAL HISTORY    Past Surgical History:   Procedure Laterality Date    HERNIA REPAIR      hiatal hernia repair       FAMILY HISTORY    History reviewed. No pertinent family history. SOCIAL HISTORY    Social History     Tobacco Use    Smoking status: Never    Smokeless tobacco: Former   Substance Use Topics    Alcohol use:  Yes       ALLERGIES    No Known Allergies    MEDICATIONS    Current Outpatient Medications on File Prior to Encounter   Medication Sig Dispense Refill    Multiple Vitamin (MVI, CELEBRATE, CHEWABLE TABLET) Take 1 tablet by mouth daily       No current facility-administered medications on file prior to encounter. REVIEW OF SYSTEMS    Pertinent items are noted in HPI. Objective:     BP (!) 148/83   Pulse 70   Resp 18   Ht 6' 2\" (1.88 m)   Wt 171 lb (77.6 kg)   SpO2 99%   BMI 21.96 kg/m²     Wt Readings from Last 3 Encounters:   02/15/23 171 lb (77.6 kg)   02/01/23 175 lb (79.4 kg)   01/18/23 172 lb (78 kg)       PHYSICAL EXAM    General: well developed, well nourished, pleasant , NAD. Hygiene good  Psych: cooperative. Pleasant. No anxiety or depression. Normal mood and affect. HEENT: Normocephalic, atraumatic. EOMI. Conjunctiva clear. No scleral icterus. Neck: Normal range of motion. No masses. Chest: Good air entry bilaterally. Respirations nonlabored  Cardio: Normal heart sounds,no rubs, murmurs or gallops  Abdomen: Soft, nontender, nondistended, normoactive bowel sounds    Vascular: DP and PT pulses are palpable at 2/4 bilateral. Skin temperature is uniform from proximal to distal bilateral. Hair growth is absent bilateral. No erythema, edema, heat is appreciated bilateral. No evidence of cellulitis. Derm: Nails 1-5 bilateral are thickened, discolored and with subungual debris. No paronychial infections are noted. Skin is atrophic and xerotic. No subcutaneous masses or hyperpigmented lesions are present. Neuro: Epicritic sensation is present bilateral. Protective sensation is present with 5.07 SWMF testing to all 10 sites bilateral. Muscle tone and bulk is symmetric bilateral.  Msk: Muscle strength is 5/5 for all prime movers of the foot bilateral.  Rigid contracture at the hallux IPJ and lesser digits PIPJ left foot. Plantarflexed first ray left foot. Prior full thickness ulceration to the plantar distal left hallux and the sub 1st metatarsal head with a thin layer of hyperkeratosis and healed intact epidermis beneath. No drainage. DATA REVIEW:  No results found for this or any previous visit (from the past 336 hour(s)).       Assessment and Plan: Problem List Items Addressed This Visit    Pressure ulcer stage III      Patient examined and evaluated. The wounds are healed. He will return to normal hygiene. He is scheduled for surgery 3/3/23 for correction of the contracted hallux to prevent recurrence. He will maintain his preoperative appointment as scheduled and all of his postoperative appointments. If the wound should recur between now and then the patient is to contact either my private practice or the wound center for immediate care. Patient instructed on the following: Follow all wound dressing instructions. Do not get dressing or wound wet. May shower if wound can be effectively kept dry. Cast covers may be purchased at [facility] and [place] [facility]. Should you experience increased redness, swelling, pain, foul odor, size of wound(s), or have a temperature over 101 degrees please contact the [address] [place] [address] Road at [phone] or if after hours contact your primary care physician or go to the hospital emergency department. No orders of the defined types were placed in this encounter. Treatment Note please see attached Discharge Instructions    Written patient dismissal instructions given to patient or POA.         Electronically signed by Devorah Taylor DPM on 2/15/2023 at 9:44 AM

## 2023-02-23 NOTE — PRE-PROCEDURE INSTRUCTIONS
Patient verified name and . Order for consent was found in EHR and matches case posting; patient verifies procedure. Type lB surgery, phone assessment complete. Orders were received. Labs per surgeon: none at present time  Labs per anesthesia protocol: none     Patient answered medical/surgical history questions at their best of ability. All prior to admission medications documented in [facility]. Patient instructed to take the following medications the day of surgery according to anesthesia guidelines with a small sip of water: none     On the day before surgery please take Acetaminophen 1000mg in the morning and then again before bed. You may substitute for Tylenol 650 mg. Hold all vitamins 7 days prior to surgery and NSAIDS 5 days prior to surgery. Prescription meds to hold:none  Patient instructed on the following:    > Arrive at [address] [facility], time of arrival to be called the day before by 1700  > NPO after midnight, unless otherwise indicated, including gum, mints, and ice chips  > Responsible adult must drive patient to the hospital, stay during surgery, and patient will need supervision 24 hours after anesthesia  > Use antibacterial soap in shower the night before surgery and on the morning of surgery  > All piercings must be removed prior to arrival.    > Leave all valuables (money and jewelry) at home but bring insurance card and ID on DOS.   > You may be required to pay a deductible or co-pay on the day of your procedure. You can pre-pay by calling [phone] if your surgery is at the [facility] or [phone] if your surgery is at the [facility]. > Do not wear make-up, nail polish, lotions, cologne, perfumes, powders, or oil on skin. Artificial nails are not permitted.

## 2023-03-02 ENCOUNTER — ANESTHESIA EVENT (OUTPATIENT)
Dept: SURGERY | Age: 73
End: 2023-03-02
Payer: MEDICARE

## 2023-03-02 NOTE — PERIOP NOTE
Preop department called to notify patient of arrival time for scheduled procedure. Instructions given to   - Arrive at [address]. - Remain NPO after midnight, unless otherwise indicated, including gum, mints, and ice chips. - Have a responsible adult to drive patient to the hospital, stay during surgery, and patient will need supervision 24 hours after anesthesia. - Use antibacterial soap in shower the night before surgery and on the morning of surgery.        Was patient contacted: yes  Voicemail left:   Numbers contacted: [phone]   Arrival time: 1000

## 2023-03-03 ENCOUNTER — APPOINTMENT (OUTPATIENT)
Dept: GENERAL RADIOLOGY | Age: 73
End: 2023-03-03
Attending: PODIATRIST
Payer: MEDICARE

## 2023-03-03 ENCOUNTER — ANESTHESIA (OUTPATIENT)
Dept: SURGERY | Age: 73
End: 2023-03-03
Payer: MEDICARE

## 2023-03-03 ENCOUNTER — HOSPITAL ENCOUNTER (OUTPATIENT)
Age: 73
Setting detail: OUTPATIENT SURGERY
Discharge: HOME | End: 2023-03-03
Attending: PODIATRIST | Admitting: PODIATRIST
Payer: MEDICARE

## 2023-03-03 VITALS
DIASTOLIC BLOOD PRESSURE: 78 MMHG | RESPIRATION RATE: 16 BRPM | OXYGEN SATURATION: 98 % | SYSTOLIC BLOOD PRESSURE: 144 MMHG | HEART RATE: 58 BPM | HEIGHT: 74 IN | BODY MASS INDEX: 21.82 KG/M2 | TEMPERATURE: 98.2 F | WEIGHT: 170 LBS

## 2023-03-03 PROCEDURE — 3700000000 HC ANESTHESIA ATTENDED CARE: Performed by: PODIATRIST

## 2023-03-03 PROCEDURE — 3700000001 HC ADD 15 MINUTES (ANESTHESIA): Performed by: PODIATRIST

## 2023-03-03 PROCEDURE — 7100000000 HC PACU RECOVERY - FIRST 15 MIN: Performed by: PODIATRIST

## 2023-03-03 PROCEDURE — 2500000003 HC RX 250 WO HCPCS: Performed by: PODIATRIST

## 2023-03-03 PROCEDURE — 7100000010 HC PHASE II RECOVERY - FIRST 15 MIN: Performed by: PODIATRIST

## 2023-03-03 PROCEDURE — 2709999900 HC NON-CHARGEABLE SUPPLY: Performed by: PODIATRIST

## 2023-03-03 PROCEDURE — 6360000002 HC RX W HCPCS: Performed by: PODIATRIST

## 2023-03-03 PROCEDURE — 6370000000 HC RX 637 (ALT 250 FOR IP): Performed by: ANESTHESIOLOGY

## 2023-03-03 PROCEDURE — 7100000001 HC PACU RECOVERY - ADDTL 15 MIN: Performed by: PODIATRIST

## 2023-03-03 PROCEDURE — 2580000003 HC RX 258: Performed by: ANESTHESIOLOGY

## 2023-03-03 PROCEDURE — 73630 X-RAY EXAM OF FOOT: CPT

## 2023-03-03 PROCEDURE — 2720000010 HC SURG SUPPLY STERILE: Performed by: PODIATRIST

## 2023-03-03 PROCEDURE — 2500000003 HC RX 250 WO HCPCS: Performed by: NURSE ANESTHETIST, CERTIFIED REGISTERED

## 2023-03-03 PROCEDURE — 7100000011 HC PHASE II RECOVERY - ADDTL 15 MIN: Performed by: PODIATRIST

## 2023-03-03 PROCEDURE — C1713 ANCHOR/SCREW BN/BN,TIS/BN: HCPCS | Performed by: PODIATRIST

## 2023-03-03 PROCEDURE — 3600000004 HC SURGERY LEVEL 4 BASE: Performed by: PODIATRIST

## 2023-03-03 PROCEDURE — 3600000014 HC SURGERY LEVEL 4 ADDTL 15MIN: Performed by: PODIATRIST

## 2023-03-03 PROCEDURE — 6360000002 HC RX W HCPCS: Performed by: NURSE ANESTHETIST, CERTIFIED REGISTERED

## 2023-03-03 DEVICE — BIOACTIVE BONE GRAFT SUBSTITUTE, FOAM PACK; BETA-TRICALCIUM PHOSPHATE, TYPE I BOVINE COLLAGEN, AND BIOACTIVE GLASS
Type: IMPLANTABLE DEVICE | Site: FOOT | Status: FUNCTIONAL
Brand: VITOSS BBTRAUMA

## 2023-03-03 DEVICE — IMPLANTABLE DEVICE
Type: IMPLANTABLE DEVICE | Site: FOOT | Status: FUNCTIONAL
Brand: FUSEFORCE

## 2023-03-03 RX ORDER — BUPIVACAINE HYDROCHLORIDE 5 MG/ML
INJECTION, SOLUTION EPIDURAL; INTRACAUDAL PRN
Status: DISCONTINUED | OUTPATIENT
Start: 2023-03-03 | End: 2023-03-03 | Stop reason: HOSPADM

## 2023-03-03 RX ORDER — ONDANSETRON 2 MG/ML
INJECTION INTRAMUSCULAR; INTRAVENOUS PRN
Status: DISCONTINUED | OUTPATIENT
Start: 2023-03-03 | End: 2023-03-03 | Stop reason: SDUPTHER

## 2023-03-03 RX ORDER — ONDANSETRON 2 MG/ML
4 INJECTION INTRAMUSCULAR; INTRAVENOUS
Status: DISCONTINUED | OUTPATIENT
Start: 2023-03-03 | End: 2023-03-03 | Stop reason: HOSPADM

## 2023-03-03 RX ORDER — OXYCODONE HYDROCHLORIDE 5 MG/1
5 TABLET ORAL
Status: DISCONTINUED | OUTPATIENT
Start: 2023-03-03 | End: 2023-03-03 | Stop reason: HOSPADM

## 2023-03-03 RX ORDER — LIDOCAINE HYDROCHLORIDE 10 MG/ML
1 INJECTION, SOLUTION INFILTRATION; PERINEURAL
Status: DISCONTINUED | OUTPATIENT
Start: 2023-03-03 | End: 2023-03-03 | Stop reason: HOSPADM

## 2023-03-03 RX ORDER — LIDOCAINE HYDROCHLORIDE 20 MG/ML
INJECTION, SOLUTION EPIDURAL; INFILTRATION; INTRACAUDAL; PERINEURAL PRN
Status: DISCONTINUED | OUTPATIENT
Start: 2023-03-03 | End: 2023-03-03 | Stop reason: SDUPTHER

## 2023-03-03 RX ORDER — SODIUM CHLORIDE 0.9 % (FLUSH) 0.9 %
5-40 SYRINGE (ML) INJECTION EVERY 12 HOURS SCHEDULED
Status: DISCONTINUED | OUTPATIENT
Start: 2023-03-03 | End: 2023-03-03 | Stop reason: HOSPADM

## 2023-03-03 RX ORDER — FENTANYL CITRATE 50 UG/ML
INJECTION, SOLUTION INTRAMUSCULAR; INTRAVENOUS PRN
Status: DISCONTINUED | OUTPATIENT
Start: 2023-03-03 | End: 2023-03-03 | Stop reason: SDUPTHER

## 2023-03-03 RX ORDER — SODIUM CHLORIDE 0.9 % (FLUSH) 0.9 %
5-40 SYRINGE (ML) INJECTION PRN
Status: DISCONTINUED | OUTPATIENT
Start: 2023-03-03 | End: 2023-03-03 | Stop reason: HOSPADM

## 2023-03-03 RX ORDER — SODIUM CHLORIDE 9 MG/ML
INJECTION, SOLUTION INTRAVENOUS PRN
Status: DISCONTINUED | OUTPATIENT
Start: 2023-03-03 | End: 2023-03-03 | Stop reason: HOSPADM

## 2023-03-03 RX ORDER — HYDROMORPHONE HYDROCHLORIDE 2 MG/ML
0.25 INJECTION, SOLUTION INTRAMUSCULAR; INTRAVENOUS; SUBCUTANEOUS EVERY 5 MIN PRN
Status: DISCONTINUED | OUTPATIENT
Start: 2023-03-03 | End: 2023-03-03 | Stop reason: HOSPADM

## 2023-03-03 RX ORDER — SODIUM CHLORIDE, SODIUM LACTATE, POTASSIUM CHLORIDE, CALCIUM CHLORIDE 600; 310; 30; 20 MG/100ML; MG/100ML; MG/100ML; MG/100ML
INJECTION, SOLUTION INTRAVENOUS CONTINUOUS
Status: DISCONTINUED | OUTPATIENT
Start: 2023-03-03 | End: 2023-03-03 | Stop reason: HOSPADM

## 2023-03-03 RX ORDER — LIDOCAINE HYDROCHLORIDE 10 MG/ML
INJECTION, SOLUTION INFILTRATION; PERINEURAL PRN
Status: DISCONTINUED | OUTPATIENT
Start: 2023-03-03 | End: 2023-03-03 | Stop reason: HOSPADM

## 2023-03-03 RX ORDER — ACETAMINOPHEN 500 MG
1000 TABLET ORAL ONCE
Status: COMPLETED | OUTPATIENT
Start: 2023-03-03 | End: 2023-03-03

## 2023-03-03 RX ORDER — MIDAZOLAM HYDROCHLORIDE 2 MG/2ML
2 INJECTION, SOLUTION INTRAMUSCULAR; INTRAVENOUS
Status: DISCONTINUED | OUTPATIENT
Start: 2023-03-03 | End: 2023-03-03 | Stop reason: HOSPADM

## 2023-03-03 RX ORDER — DEXTROSE MONOHYDRATE 100 MG/ML
INJECTION, SOLUTION INTRAVENOUS CONTINUOUS PRN
Status: DISCONTINUED | OUTPATIENT
Start: 2023-03-03 | End: 2023-03-03 | Stop reason: HOSPADM

## 2023-03-03 RX ORDER — PROPOFOL 10 MG/ML
INJECTION, EMULSION INTRAVENOUS PRN
Status: DISCONTINUED | OUTPATIENT
Start: 2023-03-03 | End: 2023-03-03 | Stop reason: SDUPTHER

## 2023-03-03 RX ORDER — HALOPERIDOL 5 MG/ML
1 INJECTION INTRAMUSCULAR
Status: DISCONTINUED | OUTPATIENT
Start: 2023-03-03 | End: 2023-03-03 | Stop reason: HOSPADM

## 2023-03-03 RX ADMIN — ONDANSETRON 4 MG: 2 INJECTION INTRAMUSCULAR; INTRAVENOUS at 12:02

## 2023-03-03 RX ADMIN — ACETAMINOPHEN 1000 MG: 500 TABLET, FILM COATED ORAL at 10:36

## 2023-03-03 RX ADMIN — LIDOCAINE HYDROCHLORIDE 40 MG: 20 INJECTION, SOLUTION EPIDURAL; INFILTRATION; INTRACAUDAL; PERINEURAL at 12:01

## 2023-03-03 RX ADMIN — FENTANYL CITRATE 25 MCG: 50 INJECTION, SOLUTION INTRAMUSCULAR; INTRAVENOUS at 12:01

## 2023-03-03 RX ADMIN — FENTANYL CITRATE 25 MCG: 50 INJECTION, SOLUTION INTRAMUSCULAR; INTRAVENOUS at 12:54

## 2023-03-03 RX ADMIN — PROPOFOL 30 MG: 10 INJECTION, EMULSION INTRAVENOUS at 12:01

## 2023-03-03 RX ADMIN — PROPOFOL 180 MCG/KG/MIN: 10 INJECTION, EMULSION INTRAVENOUS at 12:02

## 2023-03-03 RX ADMIN — SODIUM CHLORIDE, POTASSIUM CHLORIDE, SODIUM LACTATE AND CALCIUM CHLORIDE: 600; 310; 30; 20 INJECTION, SOLUTION INTRAVENOUS at 10:39

## 2023-03-03 RX ADMIN — Medication 2000 MG: at 12:08

## 2023-03-03 ASSESSMENT — PAIN SCALES - GENERAL
PAINLEVEL_OUTOF10: 0
PAINLEVEL_OUTOF10: 4

## 2023-03-03 ASSESSMENT — PAIN DESCRIPTION - DESCRIPTORS: DESCRIPTORS: ACHING

## 2023-03-03 ASSESSMENT — PAIN - FUNCTIONAL ASSESSMENT: PAIN_FUNCTIONAL_ASSESSMENT: 0-10

## 2023-03-03 NOTE — ANESTHESIA PRE PROCEDURE
Department of Anesthesiology  Preprocedure Note       Name:  Tristan Bejarano   Age:  67 y.o.  :  1950                                          MRN:  [id number]         Date:  3/3/2023      Surgeon: Torie Michael):  Franklyn Farnsworth DPM    Procedure: Procedure(s):  FUSION LEFT HALLUX LPJ    Medications prior to admission:   Prior to Admission medications    Medication Sig Start Date End Date Taking?  Authorizing Provider   Multiple Vitamin (MVI, CELEBRATE, CHEWABLE TABLET) Take 1 tablet by mouth daily    Historical Provider, MD       Current medications:    Current Facility-Administered Medications   Medication Dose Route Frequency Provider Last Rate Last Admin    sodium chloride flush 0.9 % injection 5-40 mL  5-40 mL IntraVENous 2 times per day Cheryln Goods, DPM        sodium chloride flush 0.9 % injection 5-40 mL  5-40 mL IntraVENous PRN Cheryln Goods, DPM        0.9 % sodium chloride infusion   IntraVENous PRN Raissaylmary Goods, DPM        ceFAZolin (ANCEF) 2000 mg in sterile water 20 mL IV syringe  2,000 mg IntraVENous On Call to OR Franklyn Farnsworth DPM        lidocaine 1 % injection 1 mL  1 mL IntraDERmal Once PRN Henry Rossi MD        acetaminophen (TYLENOL) tablet 1,000 mg  1,000 mg Oral Once Henry Rossi MD        lactated ringers IV soln infusion   IntraVENous Continuous Henry Rossi MD        sodium chloride flush 0.9 % injection 5-40 mL  5-40 mL IntraVENous 2 times per day Henry Rossi MD        sodium chloride flush 0.9 % injection 5-40 mL  5-40 mL IntraVENous PRN Henry Rossi MD        0.9 % sodium chloride infusion   IntraVENous PRN Henry Rossi MD        midazolam PF (VERSED) injection 2 mg  2 mg IntraVENous Once PRN Henry Rossi MD           Allergies:  No Known Allergies    Problem List:    Patient Active Problem List   Diagnosis Code    Pressure injury of left foot, stage 3 ([address] L89.893    Ulcer of left heel, with fat layer exposed ([facility] [address] L97.422    Idiopathic chronic venous hypertension of left lower extremity with ulcer and inflammation (HCC) I87.332, L97.929    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 ([facility] [address] L97.512       Past Medical History:        Diagnosis Date    Idiopathic chronic venous hypertension of left lower extremity with ulcer and inflammation ([facility] [address] 1/12/2022    Peptic ulcer disease     Pressure injury of left foot, stage 3 ([facility] [address] 3/9/2022    Ulcer of left heel, with fat layer exposed ([address] 1/12/2022       Past Surgical History:        Procedure Laterality Date    HERNIA REPAIR      hiatal hernia repair       Social History:    Social History     Tobacco Use    Smoking status: Never    Smokeless tobacco: Former   Substance Use Topics    Alcohol use: Yes                                Counseling given: Not Answered      Vital Signs (Current):   Vitals:    02/23/23 1132 03/03/23 1011   BP:  (!) 157/74   Pulse:  72   Resp:  16   Temp:  97.9 °F (36.6 °C)   TempSrc:  Tympanic   SpO2:  98%   Weight: 170 lb (77.1 kg)    Height: 6' 2\" (1.88 m)                                               BP Readings from Last 3 Encounters:   03/03/23 (!) 157/74   02/15/23 (!) 148/83   02/01/23 (!) 168/89       NPO Status: Time of last liquid consumption: 2200                        Time of last solid consumption: 2100                        Date of last liquid consumption: 03/02/23                        Date of last solid food consumption: 03/02/23    BMI:   Wt Readings from Last 3 Encounters:   02/23/23 170 lb (77.1 kg)   02/15/23 171 lb (77.6 kg)   02/01/23 175 lb (79.4 kg)     Body mass index is 21.83 kg/m². CBC: No results found for: WBC, RBC, HGB, HCT, MCV, RDW, PLT    CMP: No results found for: NA, K, CL, CO2, BUN, CREATININE, GFRAA, AGRATIO, LABGLOM, GLUCOSE, GLU, PROT, CALCIUM, BILITOT, ALKPHOS, AST, ALT    POC Tests: No results for input(s): POCGLU, POCNA, POCK, POCCL, POCBUN, POCHEMO, POCHCT in the last 72 hours.     Coags: No results found for: PROTIME, INR, APTT    HCG (If Applicable): No results found for: PREGTESTUR, PREGSERUM, HCG, HCGQUANT     ABGs: No results found for: PHART, PO2ART, UMW3ANC, IQS2EIM, BEART, L9FNCPSM     Type & Screen (If Applicable):  No results found for: LABABO, LABRH    Drug/Infectious Status (If Applicable):  No results found for: HIV, HEPCAB    COVID-19 Screening (If Applicable): No results found for: COVID19        Anesthesia Evaluation  Patient summary reviewed and Nursing notes reviewed no history of anesthetic complications:   Airway: Mallampati: II  TM distance: >3 FB   Neck ROM: full  Mouth opening: > = 3 FB   Dental:    (+) upper dentures      Pulmonary:Negative Pulmonary ROS and normal exam                               Cardiovascular:Negative CV ROS                      Neuro/Psych:                ROS comment: Polyneuropathy, chronic pain, DDD GI/Hepatic/Renal:   (+) GERD:,          ROS comment: Dysphagia. Endo/Other:    (+) blood dyscrasia: anemia:., .                  ROS comment: Heavy EtOH use Abdominal:             Vascular: Other Findings:           Anesthesia Plan      TIVA     ASA 2     (TIVA + local by surgeon)  Induction: intravenous. MIPS: Postoperative opioids intended. Anesthetic plan and risks discussed with patient.                         Henry Rossi MD   3/3/2023

## 2023-03-03 NOTE — PERIOP NOTE
PACU DISCHARGE NOTE    Patient's friend, Gentry Childs, voiced understanding of discharge instructions. Gentry Childs requested that Dr. Alphonza Alcon call to update her on surgery. This message given to OR room nurse. No other questions at this time. Vital signs stable, pain well controlled, alert and oriented times three or at baseline, follow up per surgeon, no anesthetic complications.

## 2023-03-03 NOTE — ANESTHESIA POSTPROCEDURE EVALUATION
Department of Anesthesiology  Postprocedure Note    Patient: Yana Sparrow  MRN: [id number]  YOB: 1950  Date of evaluation: 3/3/2023      Procedure Summary     Date: 03/03/23 Room / Location: [facility] OP OR 02 / [facility]    Anesthesia Start: 9458 Anesthesia Stop: 1301    Procedure: FUSION LEFT HALLUX LPJ (Left: Foot) Diagnosis:       Hallux malleus, left      Pressure ulcer of perianal region, stage III ([facility])      (Hallux malleus, left [M20.32])      (Pressure ulcer of perianal region, stage III ([facility] [address] [N04.401])    Surgeons: Hermelinda Velez DPM Responsible Provider: Nubia Sales MD    Anesthesia Type: TIVA ASA Status: 2          Anesthesia Type: TIVA    Jakub Phase I: Jakub Score: 9    Jakub Phase II: Jakub Score: 10      Anesthesia Post Evaluation    Patient location during evaluation: PACU  Patient participation: complete - patient participated  Level of consciousness: awake and alert  Airway patency: patent  Nausea: well controlled. Complications: no  Cardiovascular status: acceptable.   Respiratory status: acceptable  Hydration status: stable

## 2023-03-03 NOTE — DISCHARGE INSTRUCTIONS
Leave the dressing on, clean and dry. Expect bleeding to occur through Sunday morning. Remain in the post operative shoe except for hygiene    Apply ice behind the knee of the operative foot to prevent swelling    Elevate above heart level    Dr. Yeager So can be reached for urgent matters after hours at [phone]. Allow 1 hour for return call. ACTIVITY  As tolerated and as directed by your doctor. Bathe or shower as directed by your doctor. DIET  Clear liquids until no nausea or vomiting; then light diet for the first day. Advance to regular diet on second day, unless your doctor orders otherwise. If nausea and vomiting continues, call your doctor. PAIN  Take pain medication as directed by your doctor. Call your doctor if pain is NOT relieved by medication. DO NOT take aspirin of blood thinners unless directed by your doctor. DRESSING CARE       CALL YOUR DOCTOR IF   Excessive bleeding that does not stop after holding pressure over the area  Temperature of 101 degrees F or above  Excessive redness, swelling or bruising, and/ or green or yellow, smelly discharge from incision    AFTER ANESTHESIA   For the first 24 hours: DO NOT Drive, Drink alcoholic beverages, or Make important decisions. Be aware of dizziness following anesthesia and while taking pain medication. APPOINTMENT DATE/ TIME    YOUR DOCTOR'S PHONE NUMBER       DISCHARGE SUMMARY from Nurse    PATIENT INSTRUCTIONS:    After general anesthesia or intravenous sedation, for 24 hours or while taking prescription Narcotics:  Limit your activities  Do not drive and operate hazardous machinery  Do not make important personal or business decisions  Do  not drink alcoholic beverages  If you have not urinated within 8 hours after discharge, please contact your surgeon on call. *  Please give a list of your current medications to your Primary Care Provider.     *  Please update this list whenever your medications are discontinued, doses are      changed, or new medications (including over-the-counter products) are added. *  Please carry medication information at all times in case of emergency situations. These are general instructions for a healthy lifestyle:    No smoking/ No tobacco products/ Avoid exposure to second hand smoke    Surgeon General's Warning:  Quitting smoking now greatly reduces serious risk to your health. Obesity, smoking, and sedentary lifestyle greatly increases your risk for illness    A healthy diet, regular physical exercise & weight monitoring are important for maintaining a healthy lifestyle    You may be retaining fluid if you have a history of heart failure or if you experience any of the following symptoms:  Weight gain of 3 pounds or more overnight or 5 pounds in a week, increased swelling in our hands or feet or shortness of breath while lying flat in bed. Please call your doctor as soon as you notice any of these symptoms; do not wait until your next office visit. Recognize signs and symptoms of STROKE:    F-face looks uneven    A-arms unable to move or move unevenly    S-speech slurred or non-existent    T-time-call 911 as soon as signs and symptoms begin-DO NOT go       Back to bed or wait to see if you get better-TIME IS BRAIN.

## 2023-03-03 NOTE — BRIEF OP NOTE
Brief Postoperative Note      Patient: Vloodymyr Alatorre  YOB: 1950  MRN: [id number]    Date of Procedure: 3/3/2023    Pre-Op Diagnosis: Hallux malleus, left [M20.32]  Pressure ulcer, stage I ([address] [L89.891]    Post-Op Diagnosis: Same       Procedure(s):  FUSION LEFT HALLUX LPJ    Surgeon(s):  Vicky Delgado DPM    Assistant:  * No surgical staff found *    Anesthesia: Monitor Anesthesia Care    Estimated Blood Loss (mL): Minimal    Complications: None    Specimens:   * No specimens in log *    Implants:  Implant Name Type Inv.  Item Serial No.  Lot No. LRB No. Used Action   GRAFT BNE FOAM PK 12 CC VERSATILE SCAFFOLD VITOSS [id number] - [id number]  GRAFT BNE FOAM PK 12 CC VERSATILE SCAFFOLD VITOSS BBTRAUMA  [employer] [facility] [id number] Left 1 Implanted   STAPLE BNE FIX [id number] NIT - [id number]  STAPLE BNE FIX [id number] NIT  [facility] [employer] [facility] [id number] Left 1 Implanted   STAPLE BNE FIX [id number] NIT - [id number]  STAPLE BNE FIX [id number] NIT  [facility] [employer] [facility] [id number] Left 1 Implanted         Drains: * No LDAs found *    Findings: consistent with diagnosis    Electronically signed by Alba Elkins DPM on 3/3/2023 at 12:57 PM

## 2023-03-03 NOTE — H&P
Department of Anesthesiology  H&P                                        Name:  Raymond Rodriguez        Age:  67 y.o.  :  1950                                                 MRN:  [id number]                                                                      Date:  3/3/2023                68 y/o M presents for left hallux surgery with Dr. Sidney Lake    Surgeon: Surgeon(s):  Yolanda Washington DPM     Procedure: Procedure(s):  FUSION LEFT HALLUX LPJ     Medications prior to admission:   Home Medications           Prior to Admission medications    Medication Sig Start Date End Date Taking?  Authorizing Provider   Multiple Vitamin (MVI, CELEBRATE, CHEWABLE TABLET) Take 1 tablet by mouth daily       Historical Provider, MD            Current medications:    Current Facility-Administered Medications             Current Facility-Administered Medications   Medication Dose Route Frequency Provider Last Rate Last Admin    sodium chloride flush 0.9 % injection 5-40 mL  5-40 mL IntraVENous 2 times per day Yolanda Washington DPM        sodium chloride flush 0.9 % injection 5-40 mL  5-40 mL IntraVENous PRN Yolanda Washington DPM        0.9 % sodium chloride infusion   IntraVENous PRN Yolanda Washington DPM        ceFAZolin (ANCEF) 2000 mg in sterile water 20 mL IV syringe  2,000 mg IntraVENous On Call to [address] [place] [address], DPM        lidocaine 1 % injection 1 mL  1 mL IntraDERmal Once PRN Lita Fields MD        acetaminophen (TYLENOL) tablet 1,000 mg  1,000 mg Oral Once Lita Fields MD        lactated ringers IV soln infusion   IntraVENous Continuous Albinasrinivasan Holder MD        sodium chloride flush 0.9 % injection 5-40 mL  5-40 mL IntraVENous 2 times per day Lita Fields MD        sodium chloride flush 0.9 % injection 5-40 mL  5-40 mL IntraVENous PRN Lita Fields MD        0.9 % sodium chloride infusion   IntraVENous PRN Lita Fields MD        midazolam PF (VERSED) injection 2 mg  2 mg IntraVENous Once PRN Samina Recio MD                Allergies:  No Known Allergies     Problem List:         Patient Active Problem List   Diagnosis Code    Pressure injury of left foot, stage 3 (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 ([address] L97.512         Past Medical History:    Past Medical History             Diagnosis Date    Idiopathic chronic venous hypertension of left lower extremity with ulcer and inflammation ([address] 1/12/2022    Peptic ulcer disease      Pressure injury of left foot, stage 3 ([address] 3/9/2022    Ulcer of left heel, with fat layer exposed ([address] 1/12/2022            Past Surgical History:    Past Surgical History             Procedure Laterality Date    HERNIA REPAIR         hiatal hernia repair            Social History:    Social History           Tobacco Use    Smoking status: Never    Smokeless tobacco: Former   Substance Use Topics    Alcohol use:  Yes                                 Counseling given: Not Answered        Vital Signs (Current):   Vitals        Vitals:     02/23/23 1132 03/03/23 1011   BP:   (!) 157/74   Pulse:   72   Resp:   16   Temp:   97.9 °F (36.6 °C)   TempSrc:   Tympanic   SpO2:   98%   Weight: 170 lb (77.1 kg)     Height: 6' 2\" (1.88 m)                                                       BP Readings from Last 3 Encounters:   03/03/23 (!) 157/74   02/15/23 (!) 148/83   02/01/23 (!) 168/89         NPO Status: Time of last liquid consumption: 2200                        Time of last solid consumption: 2100                        Date of last liquid consumption: 03/02/23                        Date of last solid food consumption: 03/02/23     BMI:       Wt Readings from Last 3 Encounters:   02/23/23 170 lb (77.1 kg)   02/15/23 171 lb (77.6 kg)   02/01/23 175 lb (79.4 kg)     Body mass index is 21.83 kg/m². CBC: No results found for: WBC, RBC, HGB, HCT, MCV, RDW, PLT     CMP: No results found for: NA, K, CL, CO2, BUN, CREATININE, GFRAA, AGRATIO, LABGLOM, GLUCOSE, GLU, PROT, CALCIUM, BILITOT, ALKPHOS, AST, ALT     POC Tests: No results for input(s): POCGLU, POCNA, POCK, POCCL, POCBUN, POCHEMO, POCHCT in the last 72 hours. Coags: No results found for: PROTIME, INR, APTT     HCG (If Applicable): No results found for: PREGTESTUR, PREGSERUM, HCG, HCGQUANT      ABGs: No results found for: PHART, PO2ART, ARF6EVV, FSN2ZAM, BEART, U1KKEDDK      Type & Screen (If Applicable):  No results found for: LABABO, LABRH     Drug/Infectious Status (If Applicable):  No results found for: HIV, HEPCAB     COVID-19 Screening (If Applicable): No results found for: COVID19     ROS: Negative except for below     Anesthesia Evaluation  Patient summary reviewed and Nursing notes reviewed no history of anesthetic complications:   Airway: Mallampati: II  TM distance: >3 FB   Neck ROM: full  Mouth opening: > = 3 FB    Dental:    (+) upper dentures      Pulmonary:Negative Pulmonary ROS and normal exam                                             Cardiovascular:Negative CV ROS                              Neuro/Psych:                 ROS comment: Polyneuropathy, chronic pain, DDD GI/Hepatic/Renal:   (+) GERD:,            ROS comment: Dysphagia. Endo/Other:    (+) blood dyscrasia: anemia:., .                         ROS comment: Heavy EtOH use Abdominal:              Vascular: Other Findings:             Anesthesia Plan        TIVA      ASA 2      (TIVA + local by surgeon)  Induction: intravenous. MIPS: Postoperative opioids intended. Anesthetic plan and risks discussed with patient.                                    Farhan Layne MD   3/3/2023

## 2023-03-03 NOTE — OP NOTE
Operative Note      Patient: Radha Burks  YOB: 1950  MRN: [id number]    Date of Procedure: 3/3/2023    Pre-Op Diagnosis: Hallux malleus, left [M20.32]  Pressure ulcer, stage I ([address] [L89.891]    Post-Op Diagnosis: Same       Procedure(s):  FUSION LEFT HALLUX LPJ    Surgeon(s):  Ivonne Melchor DPM    Assistant:   * No surgical staff found *    Anesthesia: Monitor Anesthesia Care    Estimated Blood Loss (mL): Minimal    Complications: None    Specimens:   * No specimens in log *    Implants:  Implant Name Type Inv. Item Serial No.  Lot No. LRB No. Used Action   GRAFT BNE FOAM PK 12 CC VERSATILE SCAFFOLD VITOSS BBTRAUMA - [id number]  GRAFT BNE FOAM PK 12 CC VERSATILE SCAFFOLD VITOSS BBTRAUMA  [employer] [facility] [id number] Left 1 Implanted   STAPLE BNE FIX [id number] NIT - [id number]  STAPLE BNE FIX [id number] NIT  [facility] [employer] [facility] [id number] Left 1 Implanted   STAPLE BNE FIX [id number] NIT - [id number]  STAPLE BNE FIX [id number] NIT  [facility] [employer] [facility] [id number] Left 1 Implanted         Drains: * No LDAs found *    Findings: contracted hallux IPJ    Detailed Description of Procedure: The patient was transported to the operating room and placed on the operating table in the supine position. A time out and briefing were then performed with all members of the operating room staff to verify the correct patient, location and procedure. Anesthesia was performed through IV sedation. Upon adequate sedation, a local infiltrative block was performed to the left foot with 10 cc of Lidocaine 1% plain. A well padded pneumatic tourniquet was applied to the left calf. The left lower extremity was then scrubbed, prepped and draped in sterile fashion. An Esmarch was used to exsanguinate the extremity and the tourniquet was inflated to 250 mmHg. Attention was then directed to the left foot where an incision was made overlying the hallux interphalangeal joint. Superficial bleeders were cauterized with a Bovie and vital neurovascular structures were carefully retracted from the surgical site. Sharp and blunt dissection was carried to the capsule and periosteum layer which was then resected from the bone with a #15 blade to expose the bone. The joint was then exposed. A sagittal saw was used to resect the head of the proximal phalanx and the base of the distal phalanx. Irrigation was carried out and vitoss was added to the joint to improve healing. Using proper technique two [facility] 12 mm x 12 mm staples were placed for internal fixation. The joint was noted to have good bony apposition and was solid upon manipulation. CDeep closure was obtained with 4-0 Vicryl and skin with 3-0 Nylon. A post operative injection of Marcaine 0.5% plain was infiltrated to the operative site. The left lower extremity was then dressed with xeroform, 4x4 gauze, Efrain, Webril and an ace bandage. The tourniquet was deflated  and immediate return of capillary refill was noted to all digits. The patient was transported to the PACU with vital signs stable.      Electronically signed by Keegan Gudino DPM on 3/3/2023 at 3:17 PM

## 2023-04-21 ENCOUNTER — OFFICE VISIT (OUTPATIENT)
Dept: ORTHOPEDIC SURGERY | Age: 73
End: 2023-04-21
Payer: MEDICARE

## 2023-04-21 VITALS — WEIGHT: 183 LBS | BODY MASS INDEX: 23.49 KG/M2 | HEIGHT: 74 IN

## 2023-04-21 DIAGNOSIS — M51.36 DDD (DEGENERATIVE DISC DISEASE), LUMBAR: Primary | ICD-10-CM

## 2023-04-21 DIAGNOSIS — M47.816 LUMBAR FACET ARTHROPATHY: ICD-10-CM

## 2023-04-21 PROCEDURE — 1123F ACP DISCUSS/DSCN MKR DOCD: CPT | Performed by: NURSE PRACTITIONER

## 2023-04-21 PROCEDURE — 99204 OFFICE O/P NEW MOD 45 MIN: CPT | Performed by: NURSE PRACTITIONER

## 2023-04-21 PROCEDURE — 1036F TOBACCO NON-USER: CPT | Performed by: NURSE PRACTITIONER

## 2023-04-21 PROCEDURE — G8428 CUR MEDS NOT DOCUMENT: HCPCS | Performed by: NURSE PRACTITIONER

## 2023-04-21 PROCEDURE — G8420 CALC BMI NORM PARAMETERS: HCPCS | Performed by: NURSE PRACTITIONER

## 2023-04-21 PROCEDURE — 3017F COLORECTAL CA SCREEN DOC REV: CPT | Performed by: NURSE PRACTITIONER

## 2023-04-21 NOTE — PROGRESS NOTES
exhausted conservative treatment at this point we will proceed with an MRI of the lumbar spine. I discussed with him the natural history of this condition in that most episodes are typically self limited. However, the symptoms can last for several months if not even longer. What I currently recommend is that he continues with conservative treatments to help cope with the symptoms and avoid having back surgery at this time. He understands that conservative treatments typically include activity modification, NSAIDs and physical therapy. Oral and/or epidural steroids could be considered in resistant scenarios. Also, he  may want to explore chiropractic care or acupuncture. I advised to avoid any prolonged bedrest and to try to maintain ADLs as much as possible. The patient was counseled to follow up me should he  develop any neurologic symptoms such as leg pain. - A MRI was ordered to delineate anatomy, confirm the diagnosis and assess the severity. No orders of the defined types were placed in this encounter. Orders Placed This Encounter   Procedures    MRI LUMBAR SPINE WO CONTRAST        4 This is a chronic illness/condition with exacerbation and progression      Return for MRI results. Terry Naqvi, SHELLY - CNP  04/21/23      Elements of this note were created using speech recognition software. As such, errors of speech recognition may be present.

## 2023-05-19 ENCOUNTER — OFFICE VISIT (OUTPATIENT)
Dept: ORTHOPEDIC SURGERY | Age: 73
End: 2023-05-19
Payer: MEDICARE

## 2023-05-19 DIAGNOSIS — M47.816 LUMBAR FACET ARTHROPATHY: Primary | ICD-10-CM

## 2023-05-19 PROCEDURE — G8428 CUR MEDS NOT DOCUMENT: HCPCS | Performed by: NURSE PRACTITIONER

## 2023-05-19 PROCEDURE — 1123F ACP DISCUSS/DSCN MKR DOCD: CPT | Performed by: NURSE PRACTITIONER

## 2023-05-19 PROCEDURE — 1036F TOBACCO NON-USER: CPT | Performed by: NURSE PRACTITIONER

## 2023-05-19 PROCEDURE — 99214 OFFICE O/P EST MOD 30 MIN: CPT | Performed by: NURSE PRACTITIONER

## 2023-05-19 PROCEDURE — G8420 CALC BMI NORM PARAMETERS: HCPCS | Performed by: NURSE PRACTITIONER

## 2023-05-19 PROCEDURE — 3017F COLORECTAL CA SCREEN DOC REV: CPT | Performed by: NURSE PRACTITIONER

## 2023-05-19 NOTE — PATIENT INSTRUCTIONS
Spondylitis (Spinal Arthritis) and Facet Joint Syndrome  At each level in our spine, there is a single disc  the bones (vertebrae) in front of the spinal canal, and a pair of joints called facet joints joining the bones together behind the spinal canal. As we age, the spinal discs and facet joints can wear out and degenerate. Disc degeneration is the term used to describe the wearing out of the discs. Spondylosis is the term used to describe degeneration and arthritis of the facet joints. Degeneration of the spine is a normal aging process, and in most cases spinal arthritis does not cause significant symptoms. However, for some people, arthritic facet joints can cause significant pain. Back or neck pain resulting from arthritic or inflamed facet joints is a condition called facet joint syndrome.         Symptoms  Back pain with radiation into hips and buttocks or neck pain with radiation into the shoulders  Natural History (Doing Nothing)  Not all arthritic facet joints cause symptoms   Back or neck pain may not be coming from the facet joints even if they are arthritic   Symptoms may resolve without treatment   Symptoms may be short-lived and infrequent   Rarely, patients develop more persistent and debilitating pain   Facet joints have very little ability to repair themselves or regenerate  Three Phases of Treatment:   Phase I - Non-Invasive Treatments   Phase II - Spinal Injections   Phase III - Surgery (rare for this condition unless radiculopathy is present)   Goals of Each Phase:   Relieve Pain   Improve Function  Treatment Options: Phase I - Non-Invasive Treatments  Physical Therapy and Regular Home Exercise   Neck or Back Strengthening   Flexibility and Stretching  Oral Medications   Steroids   Non-Steroid Anti-Inflammatories (NSAIDs)   Pain relievers   Muscle Relaxants  Ice and Heat   4-6 weeks of Phase I treatment before MRI and going to Phase II  Treatment Options: Phase II - Facet Joint

## 2023-05-19 NOTE — PROGRESS NOTES
independently reviewed and also reviewed with patient. There is lower lumbar facet arthropathy. No stenosis. He is most symptomatic of facet arthritic pain. I would recommend left L3-L5 intra-articular facet injections. We went over radiofrequency ablations in detail as well and that he may be a candidate for this. He is interested in trialing the steroid injections first.  I will then refer him to Dr. Missy Gauthier after for further evaluation and treatment. - Injection: The patient will be referred for a lumbar steroid injection to help reduce the symptoms. The patient understands the risks including hyperglycemia, immunosuppression, meningitis, cerebrospinal fluid leak, epidural hematoma, and reaction to medication. The patient may benefit from additional injections depending on the response. The injection should be a left L3-L5 intra-articular facet injections  -Referral to [facility] non-surgical spine provider for evaluation and treatment. No orders of the defined types were placed in this encounter. No orders of the defined types were placed in this encounter. 4 This is a chronic illness/condition with exacerbation and progression      Return for refer to Maxime Baez 69 for E/T . SHELLY Pablo - CNP  05/19/23      Elements of this note were created using speech recognition software. As such, errors of speech recognition may be present.

## 2023-06-05 ENCOUNTER — OFFICE VISIT (OUTPATIENT)
Dept: ORTHOPEDIC SURGERY | Age: 73
End: 2023-06-05

## 2023-06-05 DIAGNOSIS — M51.36 DDD (DEGENERATIVE DISC DISEASE), LUMBAR: ICD-10-CM

## 2023-06-05 DIAGNOSIS — M47.816 LUMBAR FACET ARTHROPATHY: Primary | ICD-10-CM

## 2023-06-05 RX ORDER — TRIAMCINOLONE ACETONIDE 40 MG/ML
40 INJECTION, SUSPENSION INTRA-ARTICULAR; INTRAMUSCULAR ONCE
Status: COMPLETED | OUTPATIENT
Start: 2023-06-05 | End: 2023-06-05

## 2023-06-05 RX ADMIN — TRIAMCINOLONE ACETONIDE 40 MG: 40 INJECTION, SUSPENSION INTRA-ARTICULAR; INTRAMUSCULAR at 10:41

## 2023-06-05 NOTE — PROGRESS NOTES
Name: Kaylynn Naqvi  YOB: 1950  Gender: male  MRN: [id number]    Procedure: Left  L3-L4 and L4-L5 facet joint injections     Precautions: Kaylynn Naqvi deniesprior sensitivity to steroid, local anesthetic, iodine, or shellfish. The procedure was discussed at length with her and informed consent was signed and placed in the chart. She was placed in a prone position on the fluoroscopy table and the skin was prepped and draped in a routine sterile fashion. The areas to be injected were each anesthetized with 1% lidocaine. Next, a 25-gauge 3.5 inch spinal needle was carefully advanced under fluoroscopic guidance to the leftL3 - L4 facet joint. Aspiration was negative. Once proper placement was confirmed, 1 ml of 0.25% Marcaine and  0.5 mL 40 mg/mL kenalog were injected through the spinal needle. The above procedure was then repeated through the leftL4 - L5 facet joint. Fluoroscopic guidance was used intermittently over a 10-minute period to insure proper needle placement and his safety. A hard copy of the fluoroscopic images has been placed in his chart and is saved on the C-arm hard drive. He was monitored for 30 minutes after the procedure and discharged home in a stable fashion with a routine follow up. Procedural Diagnosis:     ICD-10-CM    1. Lumbar facet arthropathy  M47.816 FL INJ LUMB/SAC FACET SINGLE LEVEL     XR INJ FACET LUMB SACRAL 2ND LVL     triamcinolone acetonide (KENALOG-40) injection 40 mg      2.  DDD (degenerative disc disease), lumbar  M51.36 FL INJ LUMB/SAC FACET SINGLE LEVEL     XR INJ FACET LUMB SACRAL 2ND LVL     triamcinolone acetonide (KENALOG-40) injection 40 mg           Kaylynn De La Paz MD  06/05/23

## 2023-06-23 ENCOUNTER — OFFICE VISIT (OUTPATIENT)
Dept: ORTHOPEDIC SURGERY | Age: 73
End: 2023-06-23

## 2023-06-23 DIAGNOSIS — M47.816 LUMBAR FACET ARTHROPATHY: Primary | ICD-10-CM

## 2023-06-23 RX ORDER — CHLORPROMAZINE HYDROCHLORIDE 25 MG/1
TABLET, FILM COATED ORAL
COMMUNITY
Start: 2023-05-24

## 2023-06-23 RX ORDER — MIRTAZAPINE 15 MG/1
15 TABLET, FILM COATED ORAL NIGHTLY
COMMUNITY
Start: 2023-06-02

## 2023-06-23 RX ORDER — IBUPROFEN 600 MG/1
TABLET ORAL
COMMUNITY
Start: 2023-05-03

## 2023-06-23 RX ORDER — CHLORPROMAZINE HYDROCHLORIDE 10 MG/1
TABLET, FILM COATED ORAL
COMMUNITY
Start: 2023-05-25

## 2023-06-23 NOTE — PROGRESS NOTES
foraminal narrowing. L2-3: There is facet arthropathy and a small disc bulge. There is no central or  neural foraminal stenosis. L3-L4: There is a disc bulge with bilateral facet arthropathy. There is mild  right neural foraminal narrowing. L4-L5: There is a disc bulge with bilateral facet arthropathy. There is mild  left neural foraminal narrowing. L5-S1: There is bilateral facet arthropathy without central or neural foraminal  narrowing. Impression  Impression:  1. Multilevel lumbar spondylosis with mild left neural foraminal narrowing at  L4-5.  2. Remote L1 compression fracture. Assessment and Plan    Patient had excellent response to the injection. I recommend surveillance at this time. We will plan to follow-up the patient in 3 to 4 months for recheck of his symptoms. If his symptoms return sooner we could repeat the same injection again without a visit prior. If his symptoms evolve or change I would recommend being evaluated in the office prior to another injection.

## 2023-08-03 ENCOUNTER — TELEPHONE (OUTPATIENT)
Dept: ORTHOPEDIC SURGERY | Age: 73
End: 2023-08-03

## 2023-08-03 NOTE — TELEPHONE ENCOUNTER
This pt  called and  scheduled  for  his  Left  sx  hip. He states that  [facility]  did sx on it  in  2003  and  he  has not  been seen in a long  time. Cannot  pull up  SRS. .. sx was actually on both  hips.     Pt has no  fall or  injury  but  says  very  painful  to walk on left hip  Scheduled  for  8/30/23  Please let me know if he can be seen sooner  at  any time

## 2023-08-09 ENCOUNTER — OFFICE VISIT (OUTPATIENT)
Dept: ORTHOPEDIC SURGERY | Age: 73
End: 2023-08-09
Payer: MEDICARE

## 2023-08-09 DIAGNOSIS — M25.552 BILATERAL HIP PAIN: Primary | ICD-10-CM

## 2023-08-09 DIAGNOSIS — M25.551 BILATERAL HIP PAIN: Primary | ICD-10-CM

## 2023-08-09 PROCEDURE — 1123F ACP DISCUSS/DSCN MKR DOCD: CPT | Performed by: PHYSICIAN ASSISTANT

## 2023-08-09 PROCEDURE — G8427 DOCREV CUR MEDS BY ELIG CLIN: HCPCS | Performed by: PHYSICIAN ASSISTANT

## 2023-08-09 PROCEDURE — G8420 CALC BMI NORM PARAMETERS: HCPCS | Performed by: PHYSICIAN ASSISTANT

## 2023-08-09 PROCEDURE — 3017F COLORECTAL CA SCREEN DOC REV: CPT | Performed by: ORTHOPAEDIC SURGERY

## 2023-08-09 PROCEDURE — 1036F TOBACCO NON-USER: CPT | Performed by: ORTHOPAEDIC SURGERY

## 2023-08-09 PROCEDURE — 99203 OFFICE O/P NEW LOW 30 MIN: CPT | Performed by: PHYSICIAN ASSISTANT

## 2023-08-09 NOTE — PROGRESS NOTES
Pressure injury of left foot, stage 3 ([address]) 3/9/2022    Ulcer of left heel, with fat layer exposed ([address]) 1/12/2022     . pmh  Past Surgical History:   Procedure Laterality Date    ARTHRODESIS Left 3/3/2023    FUSION LEFT HALLUX LPJ performed by Yung Houston DPM at [facility]    HERNIA REPAIR      hiatal hernia repair     No family history on file. Social History     Socioeconomic History    Marital status: Single     Spouse name: Not on file    Number of children: Not on file    Years of education: Not on file    Highest education level: Not on file   Occupational History    Not on file   Tobacco Use    Smoking status: Never    Smokeless tobacco: Former   Substance and Sexual Activity    Alcohol use: Yes    Drug use: Not on file    Sexual activity: Not on file   Other Topics Concern    Not on file   Social History Narrative    Not on file     Social Determinants of Health     Financial Resource Strain: Not on file   Food Insecurity: Not on file   Transportation Needs: Not on file   Physical Activity: Not on file   Stress: Not on file   Social Connections: Not on file   Intimate Partner Violence: Not on file   Housing Stability: Not on file                  PHYSICAL EXAMINATION:   The patient is alert and oriented, in no distress. The lower extremities are as described below. Circulation is normal with palpable pedal pulses bilaterally and no edema. Skin of the leg is normal with no chronic stasis disease bilaterally. Sensation is intact to light touch bilaterally. Gait is noted to be with a slight trendelenburg and antalgia. bilateral hip range of motion is 0-90 degrees of flexion and 20 degrees on abduction and adduction. There is 15 degrees internal rotation and 25 degrees of external rotation with no pain in groin. No appreciable tenderness over the hips. Limb lengths are equal.  Straight leg test is negative bilaterally. Stability is normal bilaterally.   Muscular strength is intact

## 2023-09-25 ENCOUNTER — TELEPHONE (OUTPATIENT)
Dept: ORTHOPEDIC SURGERY | Age: 73
End: 2023-09-25

## 2023-09-25 NOTE — TELEPHONE ENCOUNTER
Jona HOUSE Gvl Leo 1 hour ago (1:52 PM)     [employer]  Appointment Request From: Debra Miller     With Provider: SAMPSON Calero [facility]]     Preferred Date Range: Any     Preferred Times: Any Time     Reason for visit: Request an Appointment     Comments:  back pain has come back ,and stiffness ,may need a another injecting

## 2023-09-29 ENCOUNTER — OFFICE VISIT (OUTPATIENT)
Dept: ORTHOPEDIC SURGERY | Age: 73
End: 2023-09-29
Payer: MEDICARE

## 2023-09-29 DIAGNOSIS — M47.816 LUMBAR FACET ARTHROPATHY: Primary | ICD-10-CM

## 2023-09-29 PROCEDURE — 1123F ACP DISCUSS/DSCN MKR DOCD: CPT | Performed by: PHYSICIAN ASSISTANT

## 2023-09-29 PROCEDURE — G8420 CALC BMI NORM PARAMETERS: HCPCS | Performed by: PHYSICIAN ASSISTANT

## 2023-09-29 PROCEDURE — G8428 CUR MEDS NOT DOCUMENT: HCPCS | Performed by: PHYSICIAN ASSISTANT

## 2023-09-29 PROCEDURE — 1036F TOBACCO NON-USER: CPT | Performed by: PHYSICIAN ASSISTANT

## 2023-09-29 PROCEDURE — 3017F COLORECTAL CA SCREEN DOC REV: CPT | Performed by: PHYSICIAN ASSISTANT

## 2023-09-29 PROCEDURE — 99214 OFFICE O/P EST MOD 30 MIN: CPT | Performed by: PHYSICIAN ASSISTANT

## 2023-09-29 NOTE — PROGRESS NOTES
narrowing. L2-3: There is facet arthropathy and a small disc bulge. There is no central or  neural foraminal stenosis. L3-L4: There is a disc bulge with bilateral facet arthropathy. There is mild  right neural foraminal narrowing. L4-L5: There is a disc bulge with bilateral facet arthropathy. There is mild  left neural foraminal narrowing. L5-S1: There is bilateral facet arthropathy without central or neural foraminal  narrowing. Impression  Impression:  1. Multilevel lumbar spondylosis with mild left neural foraminal narrowing at  L4-5.  2. Remote L1 compression fracture. Assessment and Plan    I recommend repeating left L3-4 and L4-5 facet joint injection with Dr. Gus Reilly. This be scheduled and arranged. I will plan to see the patient back in 3 to 4 months or sooner if needed. If the longevity of the injection begins to wane we may consider an ablation in the future.

## 2023-10-16 ENCOUNTER — OFFICE VISIT (OUTPATIENT)
Dept: ORTHOPEDIC SURGERY | Age: 73
End: 2023-10-16
Payer: MEDICARE

## 2023-10-16 DIAGNOSIS — M47.816 LUMBAR FACET ARTHROPATHY: Primary | ICD-10-CM

## 2023-10-16 DIAGNOSIS — M51.36 DDD (DEGENERATIVE DISC DISEASE), LUMBAR: ICD-10-CM

## 2023-10-16 PROCEDURE — 64493 INJ PARAVERT F JNT L/S 1 LEV: CPT | Performed by: PHYSICAL MEDICINE & REHABILITATION

## 2023-10-16 PROCEDURE — 64494 INJ PARAVERT F JNT L/S 2 LEV: CPT | Performed by: PHYSICAL MEDICINE & REHABILITATION

## 2023-10-16 RX ORDER — TRIAMCINOLONE ACETONIDE 40 MG/ML
40 INJECTION, SUSPENSION INTRA-ARTICULAR; INTRAMUSCULAR ONCE
Status: COMPLETED | OUTPATIENT
Start: 2023-10-16 | End: 2023-10-16

## 2023-10-16 RX ADMIN — TRIAMCINOLONE ACETONIDE 40 MG: 40 INJECTION, SUSPENSION INTRA-ARTICULAR; INTRAMUSCULAR at 14:26

## 2023-10-16 NOTE — PROGRESS NOTES
Name: Carie Jackman  YOB: 1950  Gender: male  MRN: [id number]    Procedure: Left  L3-L4 and L4-L5 facet joint injections     Precautions: Carie Jackman deniesprior sensitivity to steroid, local anesthetic, iodine, or shellfish. The procedure was discussed at length with her and informed consent was signed and placed in the chart. She was placed in a prone position on the fluoroscopy table and the skin was prepped and draped in a routine sterile fashion. The areas to be injected were each anesthetized with 1% lidocaine. Next, a 25-gauge 3.5 inch spinal needle was carefully advanced under fluoroscopic guidance to the leftL4 - L5 facet joint. Aspiration was negative. Once proper placement was confirmed, 1 ml of 0.25% Marcaine and 40 mg kenalog were injected through the spinal needle. The above procedure was then repeated through the leftL3 - L4 facet joint. Fluoroscopic guidance was used intermittently over a 10-minute period to insure proper needle placement and his safety. A hard copy of the fluoroscopic images has been placed in his chart and is saved on the C-arm hard drive. He was monitored for 30 minutes after the procedure and discharged home in a stable fashion with a routine follow up. Procedural Diagnosis:     ICD-10-CM    1. Lumbar facet arthropathy  M47.816 FL INJ LUMB/SAC FACET SINGLE LEVEL     XR INJ FACET LUMB SACRAL 2ND LVL     triamcinolone acetonide (KENALOG-40) injection 40 mg      2.  DDD (degenerative disc disease), lumbar  M51.36 FL INJ LUMB/SAC FACET SINGLE LEVEL     XR INJ FACET LUMB SACRAL 2ND LVL     triamcinolone acetonide (KENALOG-40) injection 40 mg           Ry Zafar MD  10/16/23

## 2024-01-22 ENCOUNTER — OFFICE VISIT (OUTPATIENT)
Dept: ORTHOPEDIC SURGERY | Age: 74
End: 2024-01-22
Payer: MEDICARE

## 2024-01-22 DIAGNOSIS — M47.816 LUMBAR FACET ARTHROPATHY: Primary | ICD-10-CM

## 2024-01-22 DIAGNOSIS — M51.36 DDD (DEGENERATIVE DISC DISEASE), LUMBAR: ICD-10-CM

## 2024-01-22 PROCEDURE — 64493 INJ PARAVERT F JNT L/S 1 LEV: CPT | Performed by: PHYSICAL MEDICINE & REHABILITATION

## 2024-01-22 PROCEDURE — 64494 INJ PARAVERT F JNT L/S 2 LEV: CPT | Performed by: PHYSICAL MEDICINE & REHABILITATION

## 2024-01-22 RX ORDER — TRIAMCINOLONE ACETONIDE 40 MG/ML
40 INJECTION, SUSPENSION INTRA-ARTICULAR; INTRAMUSCULAR ONCE
Status: COMPLETED | OUTPATIENT
Start: 2024-01-22 | End: 2024-01-22

## 2024-01-22 RX ADMIN — TRIAMCINOLONE ACETONIDE 40 MG: 40 INJECTION, SUSPENSION INTRA-ARTICULAR; INTRAMUSCULAR at 10:10

## 2024-01-22 NOTE — PROGRESS NOTES
Name: Renard Ashton  YOB: 1950  Gender: male  MRN: [id number]    Procedure: Left  L3-L4 and L4-L5 facet joint injections     Precautions: Renard Ashton deniesprior sensitivity to steroid, local anesthetic, iodine, or shellfish.     The procedure was discussed at length with her and informed consent was signed and placed in the chart. She was placed in a prone position on the fluoroscopy table and the skin was prepped and draped in a routine sterile fashion. The areas to be injected were each anesthetized with 1% lidocaine.    Next, a 25-gauge 3.5 inch spinal needle was carefully advanced under fluoroscopic guidance to the leftL3 - L4 facet joint. Aspiration was negative. Once proper placement was confirmed, 1 ml of 0.25% Marcaine and 40 mg kenalog were injected through the spinal needle.     The above procedure was then repeated through the leftL4 - L5 facet joint.      Fluoroscopic guidance was used intermittently over a 10-minute period to insure proper needle placement and his safety. A hard copy of the fluoroscopic images has been placed in his chart and is saved on the C-arm hard drive. He was monitored for 30 minutes after the procedure and discharged home in a stable fashion with a routine follow up.     Procedural Diagnosis:     ICD-10-CM    1. Lumbar facet arthropathy  M47.816 FL INJ LUMB/SAC FACET SINGLE LEVEL     XR INJ FACET LUMB SACRAL 2ND LVL     triamcinolone acetonide (KENALOG-40) injection 40 mg      2. DDD (degenerative disc disease), lumbar  M51.36 FL INJ LUMB/SAC FACET SINGLE LEVEL     XR INJ FACET LUMB SACRAL 2ND LVL     triamcinolone acetonide (KENALOG-40) injection 40 mg           AARTI KANG MD  01/22/24

## 2024-05-20 ENCOUNTER — TELEPHONE (OUTPATIENT)
Dept: ORTHOPEDIC SURGERY | Age: 74
End: 2024-05-20

## 2024-06-14 ENCOUNTER — TELEPHONE (OUTPATIENT)
Dept: ORTHOPEDIC SURGERY | Age: 74
End: 2024-06-14

## 2024-06-19 ENCOUNTER — OFFICE VISIT (OUTPATIENT)
Dept: ORTHOPEDIC SURGERY | Age: 74
End: 2024-06-19
Payer: MEDICARE

## 2024-06-19 VITALS — HEIGHT: 73 IN | BODY MASS INDEX: 23.86 KG/M2 | WEIGHT: 180 LBS

## 2024-06-19 DIAGNOSIS — M47.816 LUMBAR FACET ARTHROPATHY: Primary | ICD-10-CM

## 2024-06-19 PROCEDURE — 3017F COLORECTAL CA SCREEN DOC REV: CPT | Performed by: PHYSICIAN ASSISTANT

## 2024-06-19 PROCEDURE — G8420 CALC BMI NORM PARAMETERS: HCPCS | Performed by: PHYSICIAN ASSISTANT

## 2024-06-19 PROCEDURE — 99214 OFFICE O/P EST MOD 30 MIN: CPT | Performed by: PHYSICIAN ASSISTANT

## 2024-06-19 PROCEDURE — 1036F TOBACCO NON-USER: CPT | Performed by: PHYSICIAN ASSISTANT

## 2024-06-19 PROCEDURE — 1123F ACP DISCUSS/DSCN MKR DOCD: CPT | Performed by: PHYSICIAN ASSISTANT

## 2024-06-19 PROCEDURE — G2211 COMPLEX E/M VISIT ADD ON: HCPCS | Performed by: PHYSICIAN ASSISTANT

## 2024-06-19 PROCEDURE — G8427 DOCREV CUR MEDS BY ELIG CLIN: HCPCS | Performed by: PHYSICIAN ASSISTANT

## 2024-06-19 RX ORDER — AMLODIPINE BESYLATE 5 MG/1
5 TABLET ORAL DAILY
COMMUNITY
Start: 2024-02-22

## 2024-06-19 RX ORDER — BACLOFEN 10 MG/1
10 TABLET ORAL 3 TIMES DAILY
COMMUNITY
Start: 2024-01-17

## 2024-06-19 RX ORDER — GABAPENTIN 300 MG/1
CAPSULE ORAL
COMMUNITY
Start: 2024-04-11

## 2024-06-19 RX ORDER — CHLORPROMAZINE HYDROCHLORIDE 25 MG/1
TABLET, FILM COATED ORAL
COMMUNITY
Start: 2024-06-14

## 2024-06-19 RX ORDER — PSEUDOEPHEDRINE HCL 30 MG
100 TABLET ORAL 2 TIMES DAILY PRN
COMMUNITY
Start: 2024-04-30

## 2024-06-19 RX ORDER — SODIUM BICARBONATE 650 MG/1
1300 TABLET ORAL 2 TIMES DAILY
COMMUNITY
Start: 2024-06-08 | End: 2024-07-08

## 2024-06-19 NOTE — PROGRESS NOTES
Name: Renard Ashton  YOB: 1950  Gender: male  MRN: [id number]         *ANNUAL VISIT *        CC:   Chief Complaint   Patient presents with    Follow-up         HPI:   Renard Ashton is a 73 y.o. male with  PMHx below.  There are an established  patient of mine, and present here for Annual Visit.        We have been managing this patient's lower back pain with lumbar injection therapy.  Last underwent left L3-4 and L4-5 facet joint injections with Dr. Jasso on 1/22/2024.  This is giving him good relief of his lower back pain.  He returns today with same complaints.  Not having any radicular features.  He did recently have bladder surgery to remove the cancer in April.  He reports this has gone well in his recovery.  He would like to resume lumbar injection therapy as his back pain has returned in similar fashion.              Past Medical History Includes:   Past Medical History:   Diagnosis Date    Idiopathic chronic venous hypertension of left lower extremity with ulcer and inflammation ([facility]) 1/12/2022    Peptic ulcer disease     Pressure injury of left foot, stage 3 ([facility]) 3/9/2022    Ulcer of left heel, with fat layer exposed ([facility]) 1/12/2022   ,   Past Surgical History:   Procedure Laterality Date    ARTHRODESIS Left 3/3/2023    FUSION LEFT HALLUX LPJ performed by Isabell Maldonado DPM at [facility]    HERNIA REPAIR      hiatal hernia repair     Family History: No family history on file.   Social History:   Social History     Tobacco Use    Smoking status: Never    Smokeless tobacco: Former   Substance Use Topics    Alcohol use: Yes       ALLERGIES: No Known Allergies     Patient Medications    Current Outpatient Medications   Medication Sig Dispense Refill    mirtazapine (REMERON) 15 MG tablet Take 1 tablet by mouth nightly      ibuprofen (ADVIL;MOTRIN) 600 MG tablet TAKE 1 TABLET (600 MG) BY MOUTH EVERY 6 (SIX) HOURS FOR 20 DOSES      chlorproMAZINE (THORAZINE) 10 MG tablet TAKE 1 TABLET

## 2024-06-24 ENCOUNTER — OFFICE VISIT (OUTPATIENT)
Dept: ORTHOPEDIC SURGERY | Age: 74
End: 2024-06-24
Payer: MEDICARE

## 2024-06-24 DIAGNOSIS — M47.816 LUMBAR FACET ARTHROPATHY: Primary | ICD-10-CM

## 2024-06-24 PROCEDURE — 64494 INJ PARAVERT F JNT L/S 2 LEV: CPT | Performed by: PHYSICAL MEDICINE & REHABILITATION

## 2024-06-24 PROCEDURE — 64493 INJ PARAVERT F JNT L/S 1 LEV: CPT | Performed by: PHYSICAL MEDICINE & REHABILITATION

## 2024-06-24 RX ORDER — TRIAMCINOLONE ACETONIDE 40 MG/ML
40 INJECTION, SUSPENSION INTRA-ARTICULAR; INTRAMUSCULAR ONCE
Status: COMPLETED | OUTPATIENT
Start: 2024-06-24 | End: 2024-06-24

## 2024-06-24 RX ADMIN — TRIAMCINOLONE ACETONIDE 40 MG: 40 INJECTION, SUSPENSION INTRA-ARTICULAR; INTRAMUSCULAR at 09:29

## 2024-06-24 NOTE — PROGRESS NOTES
Name: Renard Ashton  YOB: 1950  Gender: male  MRN: [id number]    Procedure: Left  L3-L4 and L4-L5 facet joint injections     Precautions: Renard Ashton deniesprior sensitivity to steroid, local anesthetic, iodine, or shellfish.     The procedure was discussed at length with the patient and informed consent was signed and placed in the chart. The patient was placed in a prone position on the fluoroscopy table and the skin was prepped and draped in a routine sterile fashion. The areas to be injected were each anesthetized with 1% lidocaine.    Next, a 25-gauge 3.5 inch spinal needle was carefully advanced under fluoroscopic guidance to the leftL4 - L5 facet joint. Aspiration was negative. Once proper placement was confirmed, 1 ml of 0.25% Marcaine and  40 mg kenalog were injected through the spinal needle.     The above procedure was then repeated through the leftL3 - L4 facet joint.      Fluoroscopic guidance was used intermittently over a 10-minute period to insure proper needle placement and his safety. A hard copy of the fluoroscopic images has been placed in his chart and is saved on the C-arm hard drive. He was monitored for 30 minutes after the procedure and discharged home in a stable fashion with a routine follow up.     Procedural Diagnosis:     ICD-10-CM    1. Lumbar facet arthropathy  M47.816 FL INJ LUMB/SAC FACET SINGLE LEVEL     XR INJ FACET LUMB SACRAL 2ND LVL     triamcinolone acetonide (KENALOG-40) injection 40 mg     Injection Authorization - Spine           AARTI KANG MD  06/24/24

## 2024-08-06 ENCOUNTER — OFFICE VISIT (OUTPATIENT)
Age: 74
End: 2024-08-06
Payer: MEDICARE

## 2024-08-06 VITALS
HEIGHT: 73 IN | BODY MASS INDEX: 23.72 KG/M2 | DIASTOLIC BLOOD PRESSURE: 78 MMHG | SYSTOLIC BLOOD PRESSURE: 128 MMHG | HEART RATE: 78 BPM | WEIGHT: 179 LBS

## 2024-08-06 DIAGNOSIS — I87.332 IDIOPATHIC CHRONIC VENOUS HYPERTENSION OF LEFT LOWER EXTREMITY WITH ULCER AND INFLAMMATION (HCC): ICD-10-CM

## 2024-08-06 DIAGNOSIS — Z76.89 ENCOUNTER TO ESTABLISH CARE: ICD-10-CM

## 2024-08-06 DIAGNOSIS — R94.31 EKG ABNORMALITIES: ICD-10-CM

## 2024-08-06 DIAGNOSIS — R00.0 TACHYCARDIA: ICD-10-CM

## 2024-08-06 DIAGNOSIS — R06.02 SHORTNESS OF BREATH: ICD-10-CM

## 2024-08-06 DIAGNOSIS — I10 PRIMARY HYPERTENSION: Primary | ICD-10-CM

## 2024-08-06 PROCEDURE — 3074F SYST BP LT 130 MM HG: CPT | Performed by: INTERNAL MEDICINE

## 2024-08-06 PROCEDURE — 99204 OFFICE O/P NEW MOD 45 MIN: CPT | Performed by: INTERNAL MEDICINE

## 2024-08-06 PROCEDURE — G8428 CUR MEDS NOT DOCUMENT: HCPCS | Performed by: INTERNAL MEDICINE

## 2024-08-06 PROCEDURE — 1036F TOBACCO NON-USER: CPT | Performed by: INTERNAL MEDICINE

## 2024-08-06 PROCEDURE — 1123F ACP DISCUSS/DSCN MKR DOCD: CPT | Performed by: INTERNAL MEDICINE

## 2024-08-06 PROCEDURE — 3078F DIAST BP <80 MM HG: CPT | Performed by: INTERNAL MEDICINE

## 2024-08-06 PROCEDURE — 93000 ELECTROCARDIOGRAM COMPLETE: CPT | Performed by: INTERNAL MEDICINE

## 2024-08-06 PROCEDURE — 3017F COLORECTAL CA SCREEN DOC REV: CPT | Performed by: INTERNAL MEDICINE

## 2024-08-06 PROCEDURE — G8420 CALC BMI NORM PARAMETERS: HCPCS | Performed by: INTERNAL MEDICINE

## 2024-08-06 NOTE — PROGRESS NOTES
[address] [facility], [place] [address]  PHONE: [phone]        24    NAME:  Renard Ashton  : 1950  MRN: [id number]       SUBJECTIVE:   Renard Ashton is a 73 y.o. male seen for a consultation visit regarding the following:     Chief Complaint   Patient presents with    New Patient    Establish Cardiologist    Hypertension    Tachycardia          HPI:  Consultation is requested by Not, On File (Inactive) for evaluation of New Patient, Establish Cardiologist, Hypertension, and Tachycardia  Prior bladder CA s/p surgery at [facility] this spring, 2024.   Per Sisi: The patient’s post operative course was complicated by dyspnea without hypoxia with atelectasis. CTA unable to rule out distal PE. Patient was discharged on  on 3 months of Eliquis and repeat CTA in 3 months.   ? Afib seen, no clear evidence.     Energy can be poor, no CP, pressure. Some VERNON.  Patient denies recent history of orthopnea, PND, excessive dizziness and/or syncope.  Doing well on anticoagulation treatment as reviewed today, no bleeding issues or excessive bruising noted.          Echo [facility] 2024:    The left ventricular systolic function is normal (60-65%).     Indeterminate left ventricular diastolic function.     Trace to mild aortic valve regurgitation.     Mild tricuspid valve regurgitation.     Unable to estimate pulmonary hypertension. RVSP 37 mmHg + RAP/CVP       Past Medical History, Past Surgical History, Family history, Social History, and Medications were all reviewed with the patient today and updated as necessary.       Current Outpatient Medications   Medication Sig Dispense Refill    Multiple Vitamins-Minerals (MULTIVITAMIN ADULTS 50+ PO) Take by mouth      amLODIPine (NORVASC) 5 MG tablet Take 1 tablet by mouth daily      apixaban (ELIQUIS) 5 MG TABS tablet Take 1 tablet by mouth 2 times daily      baclofen (LIORESAL) 10 MG tablet Take 1 tablet by mouth 3 times daily

## 2024-08-15 NOTE — TELEPHONE ENCOUNTER
MEDICATION REFILL REQUEST      Name of Medication:  Eliquis  Dose:  5 mg  Frequency:  BID  Quantity:  180  Days' supply:  90 with 3 refills      Pharmacy Name/Location:  [facility]-[phone]

## 2024-10-02 ENCOUNTER — OFFICE VISIT (OUTPATIENT)
Age: 74
End: 2024-10-02
Payer: MEDICARE

## 2024-10-02 VITALS
HEIGHT: 73 IN | HEART RATE: 64 BPM | BODY MASS INDEX: 23.33 KG/M2 | SYSTOLIC BLOOD PRESSURE: 118 MMHG | DIASTOLIC BLOOD PRESSURE: 68 MMHG | WEIGHT: 176 LBS

## 2024-10-02 DIAGNOSIS — R94.31 EKG ABNORMALITIES: ICD-10-CM

## 2024-10-02 DIAGNOSIS — R06.02 SHORTNESS OF BREATH: ICD-10-CM

## 2024-10-02 DIAGNOSIS — R00.0 TACHYCARDIA: ICD-10-CM

## 2024-10-02 DIAGNOSIS — I10 PRIMARY HYPERTENSION: Primary | ICD-10-CM

## 2024-10-02 PROCEDURE — 1036F TOBACCO NON-USER: CPT | Performed by: INTERNAL MEDICINE

## 2024-10-02 PROCEDURE — 3017F COLORECTAL CA SCREEN DOC REV: CPT | Performed by: INTERNAL MEDICINE

## 2024-10-02 PROCEDURE — 3074F SYST BP LT 130 MM HG: CPT | Performed by: INTERNAL MEDICINE

## 2024-10-02 PROCEDURE — 3078F DIAST BP <80 MM HG: CPT | Performed by: INTERNAL MEDICINE

## 2024-10-02 PROCEDURE — 1123F ACP DISCUSS/DSCN MKR DOCD: CPT | Performed by: INTERNAL MEDICINE

## 2024-10-02 PROCEDURE — G8428 CUR MEDS NOT DOCUMENT: HCPCS | Performed by: INTERNAL MEDICINE

## 2024-10-02 PROCEDURE — G8420 CALC BMI NORM PARAMETERS: HCPCS | Performed by: INTERNAL MEDICINE

## 2024-10-02 PROCEDURE — G8484 FLU IMMUNIZE NO ADMIN: HCPCS | Performed by: INTERNAL MEDICINE

## 2024-10-02 PROCEDURE — 99214 OFFICE O/P EST MOD 30 MIN: CPT | Performed by: INTERNAL MEDICINE

## 2024-10-02 NOTE — PROGRESS NOTES
[address] [facility], [place] [address]  PHONE: [phone]     10/02/24    NAME:  Renard Ashton  : 1950  MRN: [id number]       SUBJECTIVE:   Renard Ashton is a 74 y.o. male seen for a follow up visit regarding the following:     Chief Complaint   Patient presents with    Tachycardia     NST        HPI:   Prior bladder CA s/p surgery at [facility] this spring, 2024.   Per Sisi: The patient’s post operative course was complicated by dyspnea without hypoxia with atelectasis. CTA unable to rule out distal PE. Patient was discharged on  on 3 months of Eliquis and repeat CTA in 3 months.     ? Afib seen, no clear evidence.     NST 2024: normal EF, There is no evidence of inducible ischemia.      Rare VERNON now, no CP/pressure.    NO new angina.    Patient denies recent history of orthopnea, PND, excessive dizziness and/or syncope.  Doing well on anticoagulation treatment as reviewed today, no bleeding issues or excessive bruising noted.            Echo [facility] 2024:    The left ventricular systolic function is normal (60-65%).     Indeterminate left ventricular diastolic function.     Trace to mild aortic valve regurgitation.     Mild tricuspid valve regurgitation.     Unable to estimate pulmonary hypertension. RVSP 37 mmHg + RAP/CVP           Past Medical History, Past Surgical History, Family history, Social History, and Medications were all reviewed with the patient today and updated as necessary.     Current Outpatient Medications   Medication Sig Dispense Refill    apixaban (ELIQUIS) 5 MG TABS tablet Take 1 tablet by mouth 2 times daily 180 tablet 3    amLODIPine (NORVASC) 5 MG tablet Take 1 tablet by mouth daily      baclofen (LIORESAL) 10 MG tablet Take 1 tablet by mouth 3 times daily      gabapentin (NEURONTIN) 300 MG capsule TAKE 1 CAPSULE (300 MG) BY MOUTH 2 (TWO) TIMES A DAY      chlorproMAZINE (THORAZINE) 25 MG tablet TAKE 1 TABLET (25 MG) BY MOUTH 4 (FOUR) TIMES A DAY

## 2024-10-10 ENCOUNTER — OFFICE VISIT (OUTPATIENT)
Dept: ORTHOPEDIC SURGERY | Age: 74
End: 2024-10-10
Payer: MEDICARE

## 2024-10-10 DIAGNOSIS — M47.816 LUMBAR FACET ARTHROPATHY: Primary | ICD-10-CM

## 2024-10-10 PROCEDURE — 64493 INJ PARAVERT F JNT L/S 1 LEV: CPT | Performed by: PHYSICAL MEDICINE & REHABILITATION

## 2024-10-10 PROCEDURE — 64494 INJ PARAVERT F JNT L/S 2 LEV: CPT | Performed by: PHYSICAL MEDICINE & REHABILITATION

## 2024-10-10 RX ORDER — TRIAMCINOLONE ACETONIDE 40 MG/ML
40 INJECTION, SUSPENSION INTRA-ARTICULAR; INTRAMUSCULAR ONCE
Status: COMPLETED | OUTPATIENT
Start: 2024-10-10 | End: 2024-10-10

## 2024-10-10 RX ADMIN — TRIAMCINOLONE ACETONIDE 40 MG: 40 INJECTION, SUSPENSION INTRA-ARTICULAR; INTRAMUSCULAR at 16:19

## 2024-10-10 NOTE — PROGRESS NOTES
Name: Renard Ashton  YOB: 1950  Gender: male  MRN: [id number]    Procedure: Left  L3-L4 and L4-L5 facet joint injections     Precautions: Renard Ashton deniesprior sensitivity to steroid, local anesthetic, iodine, or shellfish.     The procedure was discussed at length with the patient and informed consent was signed and placed in the chart. The patient was placed in a prone position on the fluoroscopy table and the skin was prepped and draped in a routine sterile fashion. The areas to be injected were each anesthetized with 1% lidocaine.    Next, a 25-gauge 3.5 inch spinal needle was carefully advanced under fluoroscopic guidance to the leftL4 - L5 facet joint. Aspiration was negative. Once proper placement was confirmed, 1 ml of 0.25% Marcaine and 40 mg kenalog were injected through the spinal needle.     The above procedure was then repeated through the leftL3 - L4 facet joint.      Fluoroscopic guidance was used intermittently over a 10-minute period to insure proper needle placement and his safety. A hard copy of the fluoroscopic images has been placed in his chart and is saved on the C-arm hard drive. He was monitored for 30 minutes after the procedure and discharged home in a stable fashion with a routine follow up.     Procedural Diagnosis:     ICD-10-CM    1. Lumbar facet arthropathy  M47.816 FL INJ LUMB/SAC FACET SINGLE LEVEL     XR INJ FACET LUMB SACRAL 2ND LVL     triamcinolone acetonide (KENALOG-40) injection 40 mg     Injection Authorization - Spine           AARTI KANG MD  10/10/24

## 2025-02-06 ENCOUNTER — OFFICE VISIT (OUTPATIENT)
Age: 75
End: 2025-02-06
Payer: MEDICARE

## 2025-02-06 VITALS
HEART RATE: 60 BPM | DIASTOLIC BLOOD PRESSURE: 68 MMHG | SYSTOLIC BLOOD PRESSURE: 110 MMHG | HEIGHT: 74 IN | BODY MASS INDEX: 21.94 KG/M2 | WEIGHT: 171 LBS

## 2025-02-06 DIAGNOSIS — I10 PRIMARY HYPERTENSION: ICD-10-CM

## 2025-02-06 DIAGNOSIS — R94.31 EKG ABNORMALITIES: Primary | ICD-10-CM

## 2025-02-06 DIAGNOSIS — R00.0 TACHYCARDIA: ICD-10-CM

## 2025-02-06 PROCEDURE — 3074F SYST BP LT 130 MM HG: CPT | Performed by: INTERNAL MEDICINE

## 2025-02-06 PROCEDURE — 1123F ACP DISCUSS/DSCN MKR DOCD: CPT | Performed by: INTERNAL MEDICINE

## 2025-02-06 PROCEDURE — 1036F TOBACCO NON-USER: CPT | Performed by: INTERNAL MEDICINE

## 2025-02-06 PROCEDURE — 1126F AMNT PAIN NOTED NONE PRSNT: CPT | Performed by: INTERNAL MEDICINE

## 2025-02-06 PROCEDURE — 3078F DIAST BP <80 MM HG: CPT | Performed by: INTERNAL MEDICINE

## 2025-02-06 PROCEDURE — 99214 OFFICE O/P EST MOD 30 MIN: CPT | Performed by: INTERNAL MEDICINE

## 2025-02-06 PROCEDURE — 3017F COLORECTAL CA SCREEN DOC REV: CPT | Performed by: INTERNAL MEDICINE

## 2025-02-06 PROCEDURE — G8428 CUR MEDS NOT DOCUMENT: HCPCS | Performed by: INTERNAL MEDICINE

## 2025-02-06 PROCEDURE — G8420 CALC BMI NORM PARAMETERS: HCPCS | Performed by: INTERNAL MEDICINE

## 2025-02-06 NOTE — PROGRESS NOTES
1.88 m (6' 2\")   Wt 77.6 kg (171 lb)   BMI 21.96 kg/m²    General/Constitutional:   Alert and oriented x 3, no acute distress  HEENT:   normocephalic, atraumatic, moist mucous membranes  Neck:   No JVD or carotid bruits bilaterally  Cardiovascular:   regular rate and rhythm, no murmur/rub/gallop appreciated  Pulmonary:   clear to auscultation bilaterally, no respiratory distress  Abdomen:   soft, non-tender, non-distended  Ext:   No sig LE edema bilaterally  Skin:  warm and dry, no obvious rashes seen  Neuro:   no obvious sensory or motor deficits  Psychiatric:   normal mood and affect      No results found for: \"NA\", \"K\", \"CL\", \"CO2\", \"BUN\", \"CREATININE\", \"GLUCOSE\", \"CALCIUM\"     No results found for: \"WBC\", \"HGB\", \"HCT\", \"MCV\", \"PLT\"    No results found for: \"TSHFT4\", \"TSH\"    No results found for: \"LABA1C\"  No results found for: \"EAG\"    No results found for: \"CHOL\"  No results found for: \"TRIG\"  No results found for: \"HDL\"  No components found for: \"LDLCHOLESTEROL\", \"LDLCALC\"  No results found for: \"VLDL\"  No results found for: \"CHOLHDLRATIO\"  No results found for: \"LDL\", \"LDLDIRECT\"        No results found for this or any previous visit.        I have Independently reviewed prior care notes, any ER records available, cardiac testing, labs and results with the patient and before seeing the patient today.  Also independently reviewed outside records when available.       ASSESSMENT:    Renard was seen today for hypertension.    Diagnoses and all orders for this visit:    EKG abnormalities    Primary hypertension    Tachycardia          PLAN:     Tachy: no clear AF on EKG, OFF OAC NOW.  Follow for PAF.   Echo ok.  EF normal.       Prior possible PE: off OAC.       VERNON: NST ok, no worsening angina.  We will hold on LHC for now.    Call PRN for more angina.      HTN:  remain on norvasc.  Follow BP.     [facility] labs 12/2024: LDL 61, HDL 66      The patient has been instructed to call with any angina or equivalent

## 2025-02-10 ENCOUNTER — OFFICE VISIT (OUTPATIENT)
Dept: ORTHOPEDIC SURGERY | Age: 75
End: 2025-02-10
Payer: MEDICARE

## 2025-02-10 DIAGNOSIS — M47.816 LUMBAR FACET ARTHROPATHY: Primary | ICD-10-CM

## 2025-02-10 PROCEDURE — 64494 INJ PARAVERT F JNT L/S 2 LEV: CPT | Performed by: PHYSICAL MEDICINE & REHABILITATION

## 2025-02-10 PROCEDURE — 64493 INJ PARAVERT F JNT L/S 1 LEV: CPT | Performed by: PHYSICAL MEDICINE & REHABILITATION

## 2025-02-10 RX ORDER — TRIAMCINOLONE ACETONIDE 40 MG/ML
40 INJECTION, SUSPENSION INTRA-ARTICULAR; INTRAMUSCULAR ONCE
Status: COMPLETED | OUTPATIENT
Start: 2025-02-10 | End: 2025-02-10

## 2025-02-10 RX ADMIN — TRIAMCINOLONE ACETONIDE 40 MG: 40 INJECTION, SUSPENSION INTRA-ARTICULAR; INTRAMUSCULAR at 14:03

## 2025-02-10 NOTE — PROGRESS NOTES
Name: Renard Ashton  YOB: 1950  Gender: male  MRN: [id number]    Procedure: Left  L3-L4 and L4-L5 facet joint injections     Precautions: Renard Ashton deniesprior sensitivity to steroid, local anesthetic, iodine, or shellfish.     The procedure was discussed at length with the patient and informed consent was signed and placed in the chart. The patient was placed in a prone position on the fluoroscopy table and the skin was prepped and draped in a routine sterile fashion. The areas to be injected were each anesthetized with 1% lidocaine.    Next, a 25-gauge 3.5 inch spinal needle was carefully advanced under fluoroscopic guidance to the leftL4 - L5 facet joint. Aspiration was negative. Once proper placement was confirmed, 1 ml of 0.25% Marcaine and 40 mg kenalog were injected through the spinal needle.     The above procedure was then repeated through the left L3-L4 facet joints.      Fluoroscopic guidance was used intermittently over a 10-minute period to insure proper needle placement and his safety. A hard copy of the fluoroscopic images has been placed in his chart and is saved on the C-arm hard drive. He was monitored for 30 minutes after the procedure and discharged home in a stable fashion with a routine follow up.     Procedural Diagnosis:     ICD-10-CM    1. Lumbar facet arthropathy  M47.816 FL INJ LUMB/SAC FACET SINGLE LEVEL     XR INJ FACET LUMB SACRAL 2ND LVL     triamcinolone acetonide (KENALOG-40) injection 40 mg           AARTI KANG MD  02/10/25

## 2025-05-02 ENCOUNTER — TELEPHONE (OUTPATIENT)
Dept: ORTHOPEDIC SURGERY | Age: 75
End: 2025-05-02

## 2025-05-02 DIAGNOSIS — M47.816 LUMBAR FACET ARTHROPATHY: Primary | ICD-10-CM

## 2025-05-08 ENCOUNTER — OFFICE VISIT (OUTPATIENT)
Dept: ORTHOPEDIC SURGERY | Age: 75
End: 2025-05-08
Payer: MEDICARE

## 2025-05-08 DIAGNOSIS — M25.522 LEFT ELBOW PAIN: ICD-10-CM

## 2025-05-08 DIAGNOSIS — M47.816 LUMBAR FACET ARTHROPATHY: Primary | ICD-10-CM

## 2025-05-08 PROCEDURE — 64493 INJ PARAVERT F JNT L/S 1 LEV: CPT | Performed by: PHYSICAL MEDICINE & REHABILITATION

## 2025-05-08 PROCEDURE — 64494 INJ PARAVERT F JNT L/S 2 LEV: CPT | Performed by: PHYSICAL MEDICINE & REHABILITATION

## 2025-05-08 RX ORDER — TRIAMCINOLONE ACETONIDE 40 MG/ML
40 INJECTION, SUSPENSION INTRA-ARTICULAR; INTRAMUSCULAR ONCE
Status: COMPLETED | OUTPATIENT
Start: 2025-05-08 | End: 2025-05-08

## 2025-05-08 RX ADMIN — TRIAMCINOLONE ACETONIDE 40 MG: 40 INJECTION, SUSPENSION INTRA-ARTICULAR; INTRAMUSCULAR at 15:44

## 2025-05-08 NOTE — PROGRESS NOTES
Name: Renard Ashton  YOB: 1950  Gender: male  MRN: [id number]    Procedure: Left  L3-L4 and L4-L5 facet joint injections     Precautions: Renard Ashton deniesprior sensitivity to steroid, local anesthetic, iodine, or shellfish.     The procedure was discussed at length with the patient and informed consent was signed and placed in the chart. The patient was placed in a prone position on the fluoroscopy table and the skin was prepped and draped in a routine sterile fashion. The areas to be injected were each anesthetized with 1% lidocaine.    Next, a 25-gauge 3.5 inch spinal needle was carefully advanced under fluoroscopic guidance to the leftL4 - L5 facet joint. Aspiration was negative. Once proper placement was confirmed, 1 ml of 0.25% Marcaine and 40 mg kenalog were injected through the spinal needle.     The above procedure was then repeated through the leftL3 - L4 facet joint.      Fluoroscopic guidance was used intermittently over a 10-minute period to insure proper needle placement and his safety. A hard copy of the fluoroscopic images has been placed in his chart and is saved on the C-arm hard drive. He was monitored after the procedure and discharged home in a stable fashion with a routine follow up.     Procedural Diagnosis:     ICD-10-CM    1. Lumbar facet arthropathy  M47.816 FL INJ LUMB/SAC FACET SINGLE LEVEL     XR INJ FACET LUMB SACRAL 2ND LVL     triamcinolone acetonide (KENALOG-40) injection 40 mg           AARTI KANG MD  05/08/25  
Accidental fall

## 2025-06-02 ENCOUNTER — OFFICE VISIT (OUTPATIENT)
Age: 75
End: 2025-06-02
Payer: MEDICARE

## 2025-06-02 DIAGNOSIS — M25.521 BILATERAL ELBOW JOINT PAIN: Primary | ICD-10-CM

## 2025-06-02 DIAGNOSIS — M25.522 BILATERAL ELBOW JOINT PAIN: Primary | ICD-10-CM

## 2025-06-02 PROCEDURE — 3017F COLORECTAL CA SCREEN DOC REV: CPT | Performed by: ORTHOPAEDIC SURGERY

## 2025-06-02 PROCEDURE — 99203 OFFICE O/P NEW LOW 30 MIN: CPT | Performed by: ORTHOPAEDIC SURGERY

## 2025-06-02 PROCEDURE — 1123F ACP DISCUSS/DSCN MKR DOCD: CPT | Performed by: ORTHOPAEDIC SURGERY

## 2025-06-02 PROCEDURE — G8420 CALC BMI NORM PARAMETERS: HCPCS | Performed by: ORTHOPAEDIC SURGERY

## 2025-06-02 PROCEDURE — G8428 CUR MEDS NOT DOCUMENT: HCPCS | Performed by: ORTHOPAEDIC SURGERY

## 2025-06-02 PROCEDURE — 1036F TOBACCO NON-USER: CPT | Performed by: ORTHOPAEDIC SURGERY

## 2025-06-03 NOTE — PROGRESS NOTES
[facility] Note    Name: Renard Ashton  YOB: 1950  Gender: male  MRN: [id number]      CC: Patient referred for evaluation of upper extremity pain    HPI: Renard Ashton is a 74 y.o. male with a chief complaint of discomfort over the posterior aspect of his elbows, left greater than right.  He has no history of injury.  Elbows are only uncomfortable if he leans on them directly.  He has no pain with motion.  He is mainly concerned about what exactly is going on as is not particularly bothersome to him.      ROS/Meds/PSH/PMH/FH/SH: I personally reviewed the patients standard intake form.  Pertinents are discussed in the HPI    Physical Examination:    Musculoskeletal Exam:  Examination on the bilateral upper extremity demonstrates cap refill < 5 seconds in all fingers, skin is intact, there is no swelling or tenderness palpation of the olecranon, he does have prominent olecranon traction spurs at bilateral elbows.  He has no pain with elbow range of motion.    Imaging / Electrodiagnostic Tests:     Elbow  XR: AP, Lateral, Oblique views     Clinical Indication:    ICD-10-CM    1. Bilateral elbow joint pain  M25.521 XR ELBOW BILATERAL (MIN 3 VIEWS)    M25.522              Report: AP, lateral, oblique x-ray of the bilateral elbow demonstrates ethesophytes at bilateral olecranon    Impression: as above     Vero Greco MD         Assessment:     ICD-10-CM    1. Bilateral elbow joint pain  M25.521 XR ELBOW BILATERAL (MIN 3 VIEWS)    M25.522           Plan:   We discussed the diagnosis and different treatment options. We discussed observation, therapy, antiinflammatory medications and other pertinent treatment modalities.    After discussing in detail the patient elects to proceed with observation, activity modification. Patient was pleased to know that the condition is benign. I offered heelbo sleeves, which he declined. He will follow up as needed.     Patient voiced accordance

## 2025-08-08 ENCOUNTER — OFFICE VISIT (OUTPATIENT)
Age: 75
End: 2025-08-08
Payer: MEDICARE

## 2025-08-08 VITALS
BODY MASS INDEX: 20.94 KG/M2 | DIASTOLIC BLOOD PRESSURE: 60 MMHG | HEIGHT: 73 IN | WEIGHT: 158 LBS | SYSTOLIC BLOOD PRESSURE: 98 MMHG | HEART RATE: 77 BPM

## 2025-08-08 DIAGNOSIS — R00.0 TACHYCARDIA: ICD-10-CM

## 2025-08-08 DIAGNOSIS — R94.31 EKG ABNORMALITIES: ICD-10-CM

## 2025-08-08 DIAGNOSIS — I10 PRIMARY HYPERTENSION: Primary | ICD-10-CM

## 2025-08-08 DIAGNOSIS — I48.0 PAROXYSMAL ATRIAL FIBRILLATION (HCC): ICD-10-CM

## 2025-08-08 PROCEDURE — 3074F SYST BP LT 130 MM HG: CPT | Performed by: INTERNAL MEDICINE

## 2025-08-08 PROCEDURE — G8420 CALC BMI NORM PARAMETERS: HCPCS | Performed by: INTERNAL MEDICINE

## 2025-08-08 PROCEDURE — 1126F AMNT PAIN NOTED NONE PRSNT: CPT | Performed by: INTERNAL MEDICINE

## 2025-08-08 PROCEDURE — G8428 CUR MEDS NOT DOCUMENT: HCPCS | Performed by: INTERNAL MEDICINE

## 2025-08-08 PROCEDURE — 1036F TOBACCO NON-USER: CPT | Performed by: INTERNAL MEDICINE

## 2025-08-08 PROCEDURE — 3017F COLORECTAL CA SCREEN DOC REV: CPT | Performed by: INTERNAL MEDICINE

## 2025-08-08 PROCEDURE — 99214 OFFICE O/P EST MOD 30 MIN: CPT | Performed by: INTERNAL MEDICINE

## 2025-08-08 PROCEDURE — 3078F DIAST BP <80 MM HG: CPT | Performed by: INTERNAL MEDICINE

## 2025-08-08 PROCEDURE — 93000 ELECTROCARDIOGRAM COMPLETE: CPT | Performed by: INTERNAL MEDICINE

## 2025-08-08 PROCEDURE — 1123F ACP DISCUSS/DSCN MKR DOCD: CPT | Performed by: INTERNAL MEDICINE

## 2025-08-08 RX ORDER — FERROUS SULFATE 325(65) MG
325 TABLET ORAL
COMMUNITY

## 2025-08-08 RX ORDER — SODIUM BICARBONATE 650 MG/1
1300 TABLET ORAL 2 TIMES DAILY
COMMUNITY
Start: 2025-06-03

## (undated) DEVICE — PODIATRY: Brand: MEDLINE INDUSTRIES, INC.

## (undated) DEVICE — GARMENT,MEDLINE,DVT,INT,CALF,LG, GEN2: Brand: MEDLINE

## (undated) DEVICE — STAPLE SIZER: Brand: FUSEFORCE™

## (undated) DEVICE — STERILE PVP: Brand: MEDLINE INDUSTRIES, INC.

## (undated) DEVICE — ZIMMER® STERILE DISPOSABLE TOURNIQUET CUFF WITH PLC, DUAL PORT, SINGLE BLADDER, 18 IN. (46 CM)

## (undated) DEVICE — SOLUTION IRRIG 1000ML 0.9% SOD CHL USP POUR PLAS BTL

## (undated) DEVICE — PRECISION THIN (9.0 X 0.38 X 18.5MM)

## (undated) DEVICE — PADDING CAST COHESIVE 4 YDX3 IN HND TEARABLE COTTON SPEC 100

## (undated) DEVICE — SUTURE ABSRB X-1 REV CUT 1/2 CIR 22MM UD BRAID 27IN SZ 3-0 J458H

## (undated) DEVICE — SUTURE ETHLN SZ 4-0 L18IN NONABSORBABLE BLK L19MM PS-2 3/8 1667H

## (undated) DEVICE — SUTURE NONABSORBABLE MONOFILAMENT 4-0 PS-2 18 IN BLU PROLENE 8682H

## (undated) DEVICE — SUTURE VCRL SZ 3-0 L27IN ABSRB UD L26MM SH 1/2 CIR J416H

## (undated) DEVICE — 4.0MM EGG BUR

## (undated) DEVICE — SPLINT FBRGLS W3XL35IN PRECUT ORTHOGLASS

## (undated) DEVICE — BANDAGE,GAUZE,CONFORMING,3"X75",STRL,LF: Brand: MEDLINE

## (undated) DEVICE — DRESSING PETRO W3XL18IN WHT GZ FOR N ADH WND CURAD